# Patient Record
Sex: FEMALE | Race: WHITE | Employment: FULL TIME | URBAN - METROPOLITAN AREA
[De-identification: names, ages, dates, MRNs, and addresses within clinical notes are randomized per-mention and may not be internally consistent; named-entity substitution may affect disease eponyms.]

---

## 2017-02-01 RX ORDER — CANAGLIFLOZIN 100 MG/1
TABLET, FILM COATED ORAL
Qty: 90 TABLET | Refills: 0 | Status: SHIPPED | OUTPATIENT
Start: 2017-02-01 | End: 2017-03-09 | Stop reason: DRUGHIGH

## 2017-02-01 RX ORDER — GLIMEPIRIDE 2 MG/1
TABLET ORAL
Qty: 90 TABLET | Refills: 0 | Status: SHIPPED | OUTPATIENT
Start: 2017-02-01 | End: 2017-03-08

## 2017-02-13 ENCOUNTER — APPOINTMENT (OUTPATIENT)
Dept: LAB | Age: 32
End: 2017-02-13
Attending: INTERNAL MEDICINE
Payer: COMMERCIAL

## 2017-02-13 ENCOUNTER — OFFICE VISIT (OUTPATIENT)
Dept: INTERNAL MEDICINE CLINIC | Facility: CLINIC | Age: 32
End: 2017-02-13

## 2017-02-13 VITALS
TEMPERATURE: 98 F | DIASTOLIC BLOOD PRESSURE: 86 MMHG | SYSTOLIC BLOOD PRESSURE: 126 MMHG | BODY MASS INDEX: 46.78 KG/M2 | HEART RATE: 95 BPM | HEIGHT: 64 IN | OXYGEN SATURATION: 98 % | WEIGHT: 274 LBS

## 2017-02-13 DIAGNOSIS — Z79.4 TYPE 2 DIABETES MELLITUS WITHOUT COMPLICATION, WITH LONG-TERM CURRENT USE OF INSULIN (HCC): ICD-10-CM

## 2017-02-13 DIAGNOSIS — E11.9 TYPE 2 DIABETES MELLITUS WITHOUT COMPLICATION, WITH LONG-TERM CURRENT USE OF INSULIN (HCC): Primary | ICD-10-CM

## 2017-02-13 DIAGNOSIS — Z79.4 TYPE 2 DIABETES MELLITUS WITHOUT COMPLICATION, WITH LONG-TERM CURRENT USE OF INSULIN (HCC): Primary | ICD-10-CM

## 2017-02-13 DIAGNOSIS — E11.9 TYPE 2 DIABETES MELLITUS WITHOUT COMPLICATION, WITH LONG-TERM CURRENT USE OF INSULIN (HCC): ICD-10-CM

## 2017-02-13 DIAGNOSIS — R42 DIZZINESS: ICD-10-CM

## 2017-02-13 PROCEDURE — 36415 COLL VENOUS BLD VENIPUNCTURE: CPT

## 2017-02-13 PROCEDURE — 99214 OFFICE O/P EST MOD 30 MIN: CPT | Performed by: INTERNAL MEDICINE

## 2017-02-13 PROCEDURE — 99212 OFFICE O/P EST SF 10 MIN: CPT | Performed by: INTERNAL MEDICINE

## 2017-02-13 PROCEDURE — 83036 HEMOGLOBIN GLYCOSYLATED A1C: CPT

## 2017-02-13 NOTE — PROGRESS NOTES
Geoff Grandchild is a 27year old female. Patient presents with:  Dizziness: Pt states of dizziness since January, after bending down and getting up, she would get dizzy. On 2/5 she felt dizzy and nauseous. Pt did not vomit. blood sugars were 124.    Jaw Pain: Disp: 180 tablet Rfl: 0   Multiple Vitamins-Minerals (HAIR/SKIN/NAILS) Oral Tab Take by mouth. Disp:  Rfl:    Canagliflozin (INVOKANA) 300 MG Oral Tab Take 1 tablet by mouth daily.  Disp: 90 tablet Rfl: 0   glimepiride 2 MG Oral Tab Take 1 tablet (2 mg tota mention of complication, not stated as uncontrolled 2012   • Asthma      in high school          Past Surgical History    HC IMPLANT EAR TUBES  1990    MOLECULAR EXTRACTION (M11E.1)  2002    Comment wisdom teeth.     ANKLE SCOPE,EXTENS DEBRIDEMNT Right 9/16 hypertonicity noted.    NECK: supple, no carotic bruits, no thyromegaly, no cervical or supraclavicular LAD  LUNGS: clear to auscultation bilaterally, no wheezing or rhonchi  CARDIO: RRR, normal S1S2, no gallops or murmurs  NEURO: PERRL, EOMI, no facial dano

## 2017-02-14 ENCOUNTER — TELEPHONE (OUTPATIENT)
Dept: INTERNAL MEDICINE CLINIC | Facility: CLINIC | Age: 32
End: 2017-02-14

## 2017-02-14 LAB — HBA1C MFR BLD: 7.2 % (ref 4–6)

## 2017-02-14 RX ORDER — LIRAGLUTIDE 6 MG/ML
INJECTION SUBCUTANEOUS
Qty: 9 ML | Refills: 2 | Status: SHIPPED | OUTPATIENT
Start: 2017-02-14 | End: 2017-05-15

## 2017-02-15 NOTE — TELEPHONE ENCOUNTER
Notified a1c results; does not mean that she does not have low blood sugars at times. I would like her to keep track of her sugars during these episodes of dizziness. She is feeling a little bit better with hydration today.

## 2017-02-27 ENCOUNTER — PATIENT MESSAGE (OUTPATIENT)
Dept: INTERNAL MEDICINE CLINIC | Facility: CLINIC | Age: 32
End: 2017-02-27

## 2017-02-28 ENCOUNTER — APPOINTMENT (OUTPATIENT)
Dept: GENERAL RADIOLOGY | Facility: HOSPITAL | Age: 32
End: 2017-02-28
Payer: COMMERCIAL

## 2017-02-28 ENCOUNTER — HOSPITAL ENCOUNTER (EMERGENCY)
Facility: HOSPITAL | Age: 32
Discharge: HOME | End: 2017-02-28
Attending: EMERGENCY MEDICINE
Payer: COMMERCIAL

## 2017-02-28 VITALS
DIASTOLIC BLOOD PRESSURE: 90 MMHG | HEIGHT: 64 IN | HEART RATE: 83 BPM | BODY MASS INDEX: 46.78 KG/M2 | TEMPERATURE: 98 F | RESPIRATION RATE: 20 BRPM | SYSTOLIC BLOOD PRESSURE: 137 MMHG | OXYGEN SATURATION: 98 % | WEIGHT: 274 LBS

## 2017-02-28 DIAGNOSIS — M77.12 LATERAL EPICONDYLITIS OF LEFT ELBOW: Primary | ICD-10-CM

## 2017-02-28 PROCEDURE — 99283 EMERGENCY DEPT VISIT LOW MDM: CPT

## 2017-02-28 RX ORDER — TRAMADOL HYDROCHLORIDE 50 MG/1
50 TABLET ORAL EVERY 4 HOURS PRN
Qty: 14 TABLET | Refills: 0 | Status: SHIPPED | OUTPATIENT
Start: 2017-02-28 | End: 2017-03-03

## 2017-02-28 RX ORDER — PREDNISONE 20 MG/1
40 TABLET ORAL DAILY
Qty: 6 TABLET | Refills: 0 | Status: SHIPPED | OUTPATIENT
Start: 2017-02-28 | End: 2017-03-03

## 2017-02-28 NOTE — TELEPHONE ENCOUNTER
Pt called back she is going to ER for pain in her elbow, please call pt tomorrow [phone]     Tasked to nursing

## 2017-02-28 NOTE — TELEPHONE ENCOUNTER
I called pt on cell [phone]. Pt states L elbow pain started Thurs or Fri. No injury. States pain averages 6 out of 10, intermittent. No swelling, no redness. Pain worsened on Sunday after moving furniture around in bedroom.  She applied a heating pad y

## 2017-02-28 NOTE — TELEPHONE ENCOUNTER
From: Nicanor Jaimes  To: Erica Hollis DO  Sent: 2/27/2017 9:22 PM CST  Subject: Shawnee King,     I have been experiencing pain I my left elbow for a few days now. I'm using a heating pad right now, but it's not helping.  I'm not sure what I can d

## 2017-03-01 ENCOUNTER — TELEPHONE (OUTPATIENT)
Dept: INTERNAL MEDICINE CLINIC | Facility: CLINIC | Age: 32
End: 2017-03-01

## 2017-03-01 NOTE — ED PROVIDER NOTES
Patient Seen in: [facility] AND [facility] Emergency Department    History   Patient presents with:  Upper Extremity Injury (musculoskeletal)    Stated Complaint:     HPI    HPI: Satnam Sousa is a 27year old female who presents after an injury to the l wrist MG TOTAL) BY MOUTH 2 (TWO) TIMES DAILY. Multiple Vitamins-Minerals (HAIR/SKIN/NAILS) Oral Tab,  Take by mouth. Canagliflozin (INVOKANA) 300 MG Oral Tab,  Take 1 tablet by mouth daily.    glimepiride 2 MG Oral Tab,  Take 1 tablet (2 mg total) by mouth da Systems    Positive for stated complaint:   Other systems are as noted in HPI. Constitutional and vital signs reviewed. All other systems reviewed and negative except as noted above.     [facility] and agreed except as otherwise

## 2017-03-01 NOTE — TELEPHONE ENCOUNTER
See [employer] message 2/27/17. Pt went to ED last night and for elbow pain and was dx with Lateral epicondylitis of left elbow. She was given prednisone and tramadol.     LMTCB to ask how pt is doing

## 2017-03-01 NOTE — TELEPHONE ENCOUNTER
Pt did go to ED last night and was dx with Lateral epicondylitis of left elbow. She was given prednisone and tramadol.  See TT 3/1/17

## 2017-03-01 NOTE — TELEPHONE ENCOUNTER
Please advise patient to take the prednisone with food. She should NOT take ibuprofen while she is taking prednisone (these both can upset her stomach). Would recommend that she take tylenol if needed for the pain for the time being.  She should also use an

## 2017-03-01 NOTE — ED NOTES
Pt c/o L elbow pain post moving furniture on Sunday. Pt can't lift, CNS intact, wiggles fingers. Pain when rotating wrist/arm. Denies swelling, warmth.

## 2017-03-01 NOTE — TELEPHONE ENCOUNTER
Patient called back, states she was told by the ER MD that she has tennis elbow.  Patient states she was prescribed prednisone and tramadol and took 2 tabs of the prednisone along with 1 tramadol last night around 8:30-9PM. Patient states that the elbow gustavo

## 2017-03-03 ENCOUNTER — OFFICE VISIT (OUTPATIENT)
Dept: INTERNAL MEDICINE CLINIC | Facility: CLINIC | Age: 32
End: 2017-03-03

## 2017-03-03 VITALS
TEMPERATURE: 98 F | SYSTOLIC BLOOD PRESSURE: 128 MMHG | HEIGHT: 64 IN | HEART RATE: 101 BPM | DIASTOLIC BLOOD PRESSURE: 84 MMHG | RESPIRATION RATE: 18 BRPM | BODY MASS INDEX: 50.02 KG/M2 | WEIGHT: 293 LBS | OXYGEN SATURATION: 96 %

## 2017-03-03 DIAGNOSIS — M77.12 LATERAL EPICONDYLITIS OF LEFT ELBOW: Primary | ICD-10-CM

## 2017-03-03 PROCEDURE — 99213 OFFICE O/P EST LOW 20 MIN: CPT | Performed by: INTERNAL MEDICINE

## 2017-03-03 PROCEDURE — 99212 OFFICE O/P EST SF 10 MIN: CPT | Performed by: INTERNAL MEDICINE

## 2017-03-03 NOTE — PROGRESS NOTES
Kevin العراقي is a 27year old female. Patient presents with:  Elbow Pain: F/u. Pt states continue to have left elbow pain. HPI:   Kevin العراقي is a 27year old female who presents for:    Took steroids and pain medication Tuesday night.  Took with d 2 sprays by Each Nare route daily. Disp: 3 Bottle Rfl: 3   glimepiride 4 MG Oral Tab Take 1 tablet (4 mg total) by mouth daily. Before breadfast Disp: 90 tablet Rfl: 1   LISINOPRIL 5 MG Oral Tab TAKE 1 TABLET (5 MG TOTAL) BY MOUTH DAILY.  Disp: 90 tablet Rf • Cancer Maternal Grandmother      unknown   • Seizure Disorder Brother    • Cancer Paternal Uncle      lung, smoker      Social History:     Smoking Status: Former Smoker                   Packs/Day: 0.00  Years: 12        Types: Cigarettes      Quit da

## 2017-03-08 RX ORDER — GLIMEPIRIDE 4 MG/1
TABLET ORAL
Qty: 90 TABLET | Refills: 0 | Status: SHIPPED | OUTPATIENT
Start: 2017-03-08 | End: 2017-05-09

## 2017-03-09 RX ORDER — CANAGLIFLOZIN 300 MG/1
TABLET, FILM COATED ORAL
Qty: 90 TABLET | Refills: 0 | Status: SHIPPED | OUTPATIENT
Start: 2017-03-09 | End: 2017-06-10

## 2017-03-09 NOTE — TELEPHONE ENCOUNTER
LOV 12/6/16. F/U 4/11/17. Per AM protocol OK to refill 90 days per AM protocol. Per LOV note invokana was increased to 300 mg daily.  Discontinued order for invokana 100 mg

## 2017-03-24 RX ORDER — OMEPRAZOLE 20 MG/1
CAPSULE, DELAYED RELEASE ORAL
Qty: 90 CAPSULE | Refills: 1 | Status: SHIPPED | OUTPATIENT
Start: 2017-03-24 | End: 2017-09-15

## 2017-04-11 ENCOUNTER — OFFICE VISIT (OUTPATIENT)
Dept: ENDOCRINOLOGY CLINIC | Facility: CLINIC | Age: 32
End: 2017-04-11

## 2017-04-11 VITALS
BODY MASS INDEX: 49.71 KG/M2 | WEIGHT: 291.19 LBS | HEIGHT: 64 IN | HEART RATE: 101 BPM | SYSTOLIC BLOOD PRESSURE: 124 MMHG | DIASTOLIC BLOOD PRESSURE: 82 MMHG

## 2017-04-11 DIAGNOSIS — E66.09 OBESITY DUE TO EXCESS CALORIES, UNSPECIFIED OBESITY SEVERITY: ICD-10-CM

## 2017-04-11 DIAGNOSIS — E11.69 DYSLIPIDEMIA ASSOCIATED WITH TYPE 2 DIABETES MELLITUS (HCC): ICD-10-CM

## 2017-04-11 DIAGNOSIS — E78.5 DYSLIPIDEMIA ASSOCIATED WITH TYPE 2 DIABETES MELLITUS (HCC): ICD-10-CM

## 2017-04-11 DIAGNOSIS — E11.65 UNCONTROLLED TYPE 2 DIABETES MELLITUS WITH HYPERGLYCEMIA, WITHOUT LONG-TERM CURRENT USE OF INSULIN (HCC): Primary | ICD-10-CM

## 2017-04-11 PROCEDURE — 83036 HEMOGLOBIN GLYCOSYLATED A1C: CPT | Performed by: INTERNAL MEDICINE

## 2017-04-11 PROCEDURE — 82962 GLUCOSE BLOOD TEST: CPT | Performed by: INTERNAL MEDICINE

## 2017-04-11 PROCEDURE — 36416 COLLJ CAPILLARY BLOOD SPEC: CPT | Performed by: INTERNAL MEDICINE

## 2017-04-11 PROCEDURE — 99214 OFFICE O/P EST MOD 30 MIN: CPT | Performed by: INTERNAL MEDICINE

## 2017-05-08 ENCOUNTER — PATIENT MESSAGE (OUTPATIENT)
Dept: ENDOCRINOLOGY CLINIC | Facility: CLINIC | Age: 32
End: 2017-05-08

## 2017-05-08 ENCOUNTER — PATIENT MESSAGE (OUTPATIENT)
Dept: INTERNAL MEDICINE CLINIC | Facility: CLINIC | Age: 32
End: 2017-05-08

## 2017-05-08 DIAGNOSIS — E11.9 TYPE 2 DIABETES MELLITUS WITHOUT COMPLICATION, WITHOUT LONG-TERM CURRENT USE OF INSULIN (HCC): Primary | ICD-10-CM

## 2017-05-08 NOTE — TELEPHONE ENCOUNTER
From: Brinda Galeana  To: Lizzette DO  Sent: 5/8/2017 5:33 PM CDT  Subject: Other    Hi Dr. Maximilian Felipe,     Can you please send a new prescription for lisinopril 2.5mg to [facility] in [facility]?     Thanks,   Fermin Crigler" Becci

## 2017-05-08 NOTE — TELEPHONE ENCOUNTER
To Dr. Regino Maher, please advise.  I can't find documentation of lisinopril dose being changed from 5mg to 2.5mg

## 2017-05-09 RX ORDER — GLIMEPIRIDE 4 MG/1
8 TABLET ORAL
Qty: 180 TABLET | Refills: 0 | Status: SHIPPED | OUTPATIENT
Start: 2017-05-09 | End: 2017-08-15

## 2017-05-09 RX ORDER — LISINOPRIL 5 MG/1
2.5 TABLET ORAL
Qty: 45 TABLET | Refills: 3 | Status: SHIPPED | OUTPATIENT
Start: 2017-05-09 | End: 2018-05-01

## 2017-05-09 NOTE — TELEPHONE ENCOUNTER
Please notify patient that rx was sent. I have put blood test orders in for a1c and CMP that she should do these in August. She should come in for a physical at that time.

## 2017-05-09 NOTE — TELEPHONE ENCOUNTER
Called pt at [phone] and LMOVM per [facility] relaying Dr. Bartolo Magana 's message

## 2017-05-09 NOTE — TELEPHONE ENCOUNTER
From: Francine Gaffney  To: Aranza Sellers MD  Sent: 5/8/2017 5:34 PM CDT  Subject: Other    Hello Dr. Mary Abraham,    Can you please send a new prescription for Glimepiride 4mg twice daily to [facility] in [facility] please?     Thanks,   Alcira Antonio

## 2017-05-09 NOTE — TELEPHONE ENCOUNTER
LOV 4/11/17. F/U 7/11/17. Please review LOV note. Patient was instructed to take 8mg daily, not 4mg BID. Refill set up per LOV note.

## 2017-05-15 ENCOUNTER — OFFICE VISIT (OUTPATIENT)
Dept: INTERNAL MEDICINE CLINIC | Facility: CLINIC | Age: 32
End: 2017-05-15

## 2017-05-15 VITALS
WEIGHT: 288.38 LBS | HEIGHT: 64 IN | SYSTOLIC BLOOD PRESSURE: 124 MMHG | HEART RATE: 91 BPM | BODY MASS INDEX: 49.23 KG/M2 | DIASTOLIC BLOOD PRESSURE: 86 MMHG | OXYGEN SATURATION: 98 % | TEMPERATURE: 98 F

## 2017-05-15 DIAGNOSIS — E11.9 TYPE 2 DIABETES MELLITUS WITHOUT COMPLICATION, WITHOUT LONG-TERM CURRENT USE OF INSULIN (HCC): ICD-10-CM

## 2017-05-15 DIAGNOSIS — K21.9 GASTROESOPHAGEAL REFLUX DISEASE, ESOPHAGITIS PRESENCE NOT SPECIFIED: ICD-10-CM

## 2017-05-15 DIAGNOSIS — E78.00 PURE HYPERCHOLESTEROLEMIA: ICD-10-CM

## 2017-05-15 DIAGNOSIS — K20.90 ESOPHAGITIS: Primary | ICD-10-CM

## 2017-05-15 PROCEDURE — 99212 OFFICE O/P EST SF 10 MIN: CPT | Performed by: INTERNAL MEDICINE

## 2017-05-15 PROCEDURE — 99214 OFFICE O/P EST MOD 30 MIN: CPT | Performed by: INTERNAL MEDICINE

## 2017-05-16 RX ORDER — LIRAGLUTIDE 6 MG/ML
INJECTION SUBCUTANEOUS
Qty: 9 ML | Refills: 3 | Status: SHIPPED | OUTPATIENT
Start: 2017-05-16 | End: 2017-09-11

## 2017-05-16 NOTE — PROGRESS NOTES
Kevin العراقي is a 34year old female. HPI:   Patient presents with:  Sore Throat: Patient woke up this morning with a sore throat. Her head started hurting later in the day. She coughed up some mucus with blood in it this morning.  She reports drinking Types: Cigarettes      Quit date: 03/25/2014    Smokeless Status: Never Used                        Comment: 0.5 pack q 2 wks    Alcohol Use: Yes           0.6 oz/week       1 Standard drinks or equivalent per week       Comment: 1-2 drinks/week Pen-injector INJECT 1.8 MG INTO THE SKIN DAILY.  Disp: 9 mL Rfl: 3       Allergies:    Codeine                 Other (See Comments)    Comment:Sleep walks, hallucinations  Pineapple               Tongue Swelling  Vicodin [Hydrocodon*    Nausea and vomiting Jesus Mcbride MD

## 2017-06-09 RX ORDER — ATORVASTATIN CALCIUM 40 MG/1
TABLET, FILM COATED ORAL
Qty: 90 TABLET | Refills: 3 | Status: SHIPPED | OUTPATIENT
Start: 2017-06-09 | End: 2018-06-13

## 2017-06-12 RX ORDER — CANAGLIFLOZIN 300 MG/1
TABLET, FILM COATED ORAL
Qty: 90 TABLET | Refills: 0 | Status: SHIPPED | OUTPATIENT
Start: 2017-06-12 | End: 2017-09-09

## 2017-06-26 ENCOUNTER — OFFICE VISIT (OUTPATIENT)
Dept: INTERNAL MEDICINE CLINIC | Facility: CLINIC | Age: 32
End: 2017-06-26

## 2017-06-26 VITALS
HEIGHT: 64 IN | TEMPERATURE: 98 F | BODY MASS INDEX: 48.83 KG/M2 | SYSTOLIC BLOOD PRESSURE: 110 MMHG | HEART RATE: 80 BPM | DIASTOLIC BLOOD PRESSURE: 74 MMHG | WEIGHT: 286 LBS

## 2017-06-26 DIAGNOSIS — M79.89 SWELLING OF DIGIT OF LEFT HAND: Primary | ICD-10-CM

## 2017-06-26 PROCEDURE — 99213 OFFICE O/P EST LOW 20 MIN: CPT | Performed by: INTERNAL MEDICINE

## 2017-06-26 PROCEDURE — 99212 OFFICE O/P EST SF 10 MIN: CPT | Performed by: INTERNAL MEDICINE

## 2017-07-11 ENCOUNTER — OFFICE VISIT (OUTPATIENT)
Dept: ENDOCRINOLOGY CLINIC | Facility: CLINIC | Age: 32
End: 2017-07-11

## 2017-07-11 VITALS
SYSTOLIC BLOOD PRESSURE: 135 MMHG | BODY MASS INDEX: 49.34 KG/M2 | WEIGHT: 289 LBS | DIASTOLIC BLOOD PRESSURE: 93 MMHG | HEIGHT: 64 IN | HEART RATE: 89 BPM

## 2017-07-11 DIAGNOSIS — E66.9 OBESITY, UNSPECIFIED OBESITY SEVERITY, UNSPECIFIED OBESITY TYPE: ICD-10-CM

## 2017-07-11 DIAGNOSIS — E78.5 DYSLIPIDEMIA ASSOCIATED WITH TYPE 2 DIABETES MELLITUS (HCC): ICD-10-CM

## 2017-07-11 DIAGNOSIS — E11.9 TYPE 2 DIABETES MELLITUS WITHOUT COMPLICATION, WITHOUT LONG-TERM CURRENT USE OF INSULIN (HCC): Primary | ICD-10-CM

## 2017-07-11 DIAGNOSIS — E11.69 DYSLIPIDEMIA ASSOCIATED WITH TYPE 2 DIABETES MELLITUS (HCC): ICD-10-CM

## 2017-07-11 LAB
CARTRIDGE LOT#: ABNORMAL NUMERIC
GLUCOSE BLOOD: 136
HEMOGLOBIN A1C: 6.8 % (ref 4.3–5.6)
TEST STRIP LOT #: NORMAL NUMERIC

## 2017-07-11 PROCEDURE — 36416 COLLJ CAPILLARY BLOOD SPEC: CPT | Performed by: INTERNAL MEDICINE

## 2017-07-11 PROCEDURE — 99214 OFFICE O/P EST MOD 30 MIN: CPT | Performed by: INTERNAL MEDICINE

## 2017-07-11 PROCEDURE — 82962 GLUCOSE BLOOD TEST: CPT | Performed by: INTERNAL MEDICINE

## 2017-07-11 PROCEDURE — 83036 HEMOGLOBIN GLYCOSYLATED A1C: CPT | Performed by: INTERNAL MEDICINE

## 2017-07-11 NOTE — PROGRESS NOTES
Return Office Visit - Diabetes    CHIEF COMPLAINT:    DM   Dyslipidemia  Obesity    HISTORY OF PRESENT ILLNESS:   Tip Johnson is a 34year old female who presents for follow up for diabetes.     DM HISTORY  Diagnosed: at age 26    FH of DM: Parents have Each Nare route daily.  Disp: 3 Bottle Rfl: 3   Blood Glucose Monitoring Suppl (ACCU-CHEK RYANN PLUS) W/DEVICE Does not apply Kit Use meter to check blood glucose as instructed Disp: 1 kit Rfl: 0   ACCU-CHEK FASTCLIX LANCETS Does not apply Misc Check BG 4 t atraumatic  NECK:  Supple, symmetrical, trachea midline, no adenopathy, thyroid symmetric, not enlarged and no tenderness  LUNGS: clear to auscultation bilaterally, no crackles or wheezing  CARDIOVASCULAR:  regular rate and rhythm, normal S1 and S2  ABDOME recognition and management discussed    2. Patient’s BP is normal.   3. LDL is 59  A) Discussed lifestyle modifications including reductions in dietary total and saturated fat, weight loss, aerobic exercise, and eating a diet rich in fruits and vegetables.

## 2017-07-15 NOTE — PROGRESS NOTES
Erasmo Richter is a 34year old female. Patient presents with:  Checkup: pain in little finger left hand      HPI:   Erasmo Richter is a 34year old female who presents for: finger pain    Reports pain in 5th digit of left hand. Denies injury to the area.  Diego Hint LANCETS Does not apply Misc Check BG 4 times a day Disp: 100 each Rfl: 3   Glucose Blood (ACCU-CHEK RYANN PLUS) In Vitro Strip Check BG 4 times a day Disp: 100 each Rfl: 3   aspirin 81 MG Oral Tab Take 81 mg by mouth daily.  Disp:  Rfl:    ibuprofen (MOTRIN equivalent: 1 per week     Comment: 1-2 drinks/week         REVIEW OF SYSTEMS:   GENERAL: feels well otherwise  Reports LUE 5th digit pain and edema    EXAM:   /74   Pulse 80   Temp 98.4 °F (36.9 °C) (Oral)   Ht 5' 4\" (1.626 m)   Wt 286 lb (129.7 kg

## 2017-08-09 RX ORDER — LISINOPRIL 5 MG/1
TABLET ORAL
Qty: 90 TABLET | Refills: 2 | OUTPATIENT
Start: 2017-08-09

## 2017-08-15 RX ORDER — GLIMEPIRIDE 4 MG/1
8 TABLET ORAL
Qty: 180 TABLET | Refills: 0 | Status: SHIPPED | OUTPATIENT
Start: 2017-08-15 | End: 2017-11-10

## 2017-09-11 RX ORDER — LIRAGLUTIDE 6 MG/ML
INJECTION SUBCUTANEOUS
Qty: 9 ML | Refills: 2 | Status: SHIPPED | OUTPATIENT
Start: 2017-09-11 | End: 2017-12-15

## 2017-09-11 RX ORDER — CANAGLIFLOZIN 300 MG/1
TABLET, FILM COATED ORAL
Qty: 90 TABLET | Refills: 0 | Status: SHIPPED | OUTPATIENT
Start: 2017-09-11 | End: 2017-12-13

## 2017-09-15 RX ORDER — OMEPRAZOLE 20 MG/1
CAPSULE, DELAYED RELEASE ORAL
Qty: 90 CAPSULE | Refills: 1 | Status: SHIPPED | OUTPATIENT
Start: 2017-09-15 | End: 2018-03-09

## 2017-11-07 ENCOUNTER — OFFICE VISIT (OUTPATIENT)
Dept: ENDOCRINOLOGY CLINIC | Facility: CLINIC | Age: 32
End: 2017-11-07

## 2017-11-07 VITALS
DIASTOLIC BLOOD PRESSURE: 86 MMHG | WEIGHT: 291.38 LBS | BODY MASS INDEX: 49.75 KG/M2 | HEART RATE: 90 BPM | SYSTOLIC BLOOD PRESSURE: 123 MMHG | HEIGHT: 64 IN

## 2017-11-07 DIAGNOSIS — E11.65 UNCONTROLLED TYPE 2 DIABETES MELLITUS WITH HYPERGLYCEMIA, WITHOUT LONG-TERM CURRENT USE OF INSULIN (HCC): Primary | ICD-10-CM

## 2017-11-07 PROCEDURE — 99213 OFFICE O/P EST LOW 20 MIN: CPT | Performed by: INTERNAL MEDICINE

## 2017-11-07 PROCEDURE — 36416 COLLJ CAPILLARY BLOOD SPEC: CPT | Performed by: INTERNAL MEDICINE

## 2017-11-07 PROCEDURE — 82962 GLUCOSE BLOOD TEST: CPT | Performed by: INTERNAL MEDICINE

## 2017-11-07 PROCEDURE — 83036 HEMOGLOBIN GLYCOSYLATED A1C: CPT | Performed by: INTERNAL MEDICINE

## 2017-11-10 RX ORDER — GLIMEPIRIDE 4 MG/1
8 TABLET ORAL
Qty: 180 TABLET | Refills: 0 | Status: SHIPPED | OUTPATIENT
Start: 2017-11-10 | End: 2018-02-07

## 2017-12-13 RX ORDER — CANAGLIFLOZIN 300 MG/1
TABLET, FILM COATED ORAL
Qty: 90 TABLET | Refills: 0 | Status: SHIPPED | OUTPATIENT
Start: 2017-12-13 | End: 2018-03-09

## 2017-12-14 RX ORDER — PEN NEEDLE, DIABETIC 32GX 5/32"
NEEDLE, DISPOSABLE MISCELLANEOUS
Qty: 100 EACH | Refills: 2 | Status: SHIPPED | OUTPATIENT
Start: 2017-12-14 | End: 2018-09-12

## 2017-12-15 RX ORDER — LIRAGLUTIDE 6 MG/ML
INJECTION SUBCUTANEOUS
Qty: 9 ML | Refills: 2 | Status: SHIPPED | OUTPATIENT
Start: 2017-12-15 | End: 2018-04-14

## 2018-02-07 RX ORDER — GLIMEPIRIDE 4 MG/1
8 TABLET ORAL
Qty: 180 TABLET | Refills: 0 | Status: SHIPPED | OUTPATIENT
Start: 2018-02-07 | End: 2018-05-12

## 2018-02-20 ENCOUNTER — OFFICE VISIT (OUTPATIENT)
Dept: ENDOCRINOLOGY CLINIC | Facility: CLINIC | Age: 33
End: 2018-02-20

## 2018-02-20 ENCOUNTER — APPOINTMENT (OUTPATIENT)
Dept: LAB | Facility: HOSPITAL | Age: 33
End: 2018-02-20
Attending: INTERNAL MEDICINE
Payer: COMMERCIAL

## 2018-02-20 VITALS
DIASTOLIC BLOOD PRESSURE: 84 MMHG | HEART RATE: 98 BPM | SYSTOLIC BLOOD PRESSURE: 132 MMHG | HEIGHT: 64 IN | BODY MASS INDEX: 48.14 KG/M2 | WEIGHT: 282 LBS

## 2018-02-20 DIAGNOSIS — E78.5 DYSLIPIDEMIA: ICD-10-CM

## 2018-02-20 DIAGNOSIS — E11.65 UNCONTROLLED TYPE 2 DIABETES MELLITUS WITH COMPLICATION, UNSPECIFIED LONG TERM INSULIN USE STATUS: ICD-10-CM

## 2018-02-20 DIAGNOSIS — E11.8 UNCONTROLLED TYPE 2 DIABETES MELLITUS WITH COMPLICATION, UNSPECIFIED LONG TERM INSULIN USE STATUS: Primary | ICD-10-CM

## 2018-02-20 DIAGNOSIS — E11.8 UNCONTROLLED TYPE 2 DIABETES MELLITUS WITH COMPLICATION, UNSPECIFIED LONG TERM INSULIN USE STATUS: ICD-10-CM

## 2018-02-20 DIAGNOSIS — E66.01 CLASS 3 SEVERE OBESITY DUE TO EXCESS CALORIES WITH SERIOUS COMORBIDITY AND BODY MASS INDEX (BMI) OF 45.0 TO 49.9 IN ADULT (HCC): ICD-10-CM

## 2018-02-20 DIAGNOSIS — E11.65 UNCONTROLLED TYPE 2 DIABETES MELLITUS WITH COMPLICATION, UNSPECIFIED LONG TERM INSULIN USE STATUS: Primary | ICD-10-CM

## 2018-02-20 LAB
ALBUMIN SERPL BCP-MCNC: 3.9 G/DL (ref 3.5–4.8)
ALBUMIN/GLOB SERPL: 1.3 {RATIO} (ref 1–2)
ALP SERPL-CCNC: 69 U/L (ref 32–100)
ALT SERPL-CCNC: 15 U/L (ref 14–54)
ANION GAP SERPL CALC-SCNC: 8 MMOL/L (ref 0–18)
AST SERPL-CCNC: 14 U/L (ref 15–41)
BILIRUB SERPL-MCNC: 0.4 MG/DL (ref 0.3–1.2)
BUN SERPL-MCNC: 8 MG/DL (ref 8–20)
BUN/CREAT SERPL: 12.3 (ref 10–20)
CALCIUM SERPL-MCNC: 9.2 MG/DL (ref 8.5–10.5)
CARTRIDGE LOT#: ABNORMAL NUMERIC
CHLORIDE SERPL-SCNC: 102 MMOL/L (ref 95–110)
CO2 SERPL-SCNC: 26 MMOL/L (ref 22–32)
CREAT SERPL-MCNC: 0.65 MG/DL (ref 0.5–1.5)
CREAT UR-MCNC: 55.7 MG/DL
GLOBULIN PLAS-MCNC: 3 G/DL (ref 2.5–3.7)
GLUCOSE BLOOD: 227
GLUCOSE SERPL-MCNC: 163 MG/DL (ref 70–99)
HEMOGLOBIN A1C: 6.7 % (ref 4.3–5.6)
LDLC SERPL DIRECT ASSAY-MCNC: 68 MG/DL (ref 0–99)
MICROALBUMIN UR-MCNC: 0.5 MG/DL (ref 0–1.8)
MICROALBUMIN/CREAT UR: 9 MG/G{CREAT} (ref 0–20)
OSMOLALITY UR CALC.SUM OF ELEC: 284 MOSM/KG (ref 275–295)
PATIENT FASTING: NO
POTASSIUM SERPL-SCNC: 4.1 MMOL/L (ref 3.3–5.1)
PROT SERPL-MCNC: 6.9 G/DL (ref 5.9–8.4)
SODIUM SERPL-SCNC: 136 MMOL/L (ref 136–144)
TEST STRIP LOT #: NORMAL NUMERIC

## 2018-02-20 PROCEDURE — 82570 ASSAY OF URINE CREATININE: CPT

## 2018-02-20 PROCEDURE — 82962 GLUCOSE BLOOD TEST: CPT | Performed by: INTERNAL MEDICINE

## 2018-02-20 PROCEDURE — 99214 OFFICE O/P EST MOD 30 MIN: CPT | Performed by: INTERNAL MEDICINE

## 2018-02-20 PROCEDURE — 36415 COLL VENOUS BLD VENIPUNCTURE: CPT

## 2018-02-20 PROCEDURE — 82043 UR ALBUMIN QUANTITATIVE: CPT

## 2018-02-20 PROCEDURE — 80053 COMPREHEN METABOLIC PANEL: CPT

## 2018-02-20 PROCEDURE — 36416 COLLJ CAPILLARY BLOOD SPEC: CPT | Performed by: INTERNAL MEDICINE

## 2018-02-20 PROCEDURE — 83036 HEMOGLOBIN GLYCOSYLATED A1C: CPT | Performed by: INTERNAL MEDICINE

## 2018-02-20 PROCEDURE — 83721 ASSAY OF BLOOD LIPOPROTEIN: CPT

## 2018-02-20 NOTE — PROGRESS NOTES
Return Office Visit - Diabetes    CHIEF COMPLAINT:    DM   Dyslipidemia  Obesity    HISTORY OF PRESENT ILLNESS:   Iván Russ is a 34year old female who presents for follow up for diabetes.     DM HISTORY  Diagnosed: at age 26    FH of DM: Parents have Glucose Monitoring Suppl (ACCU-CHEK RYANN PLUS) W/DEVICE Does not apply Kit Use meter to check blood glucose as instructed Disp: 1 kit Rfl: 0   ACCU-CHEK FASTCLIX LANCETS Does not apply Misc Check BG 4 times a day Disp: 100 each Rfl: 3   Glucose Blood (ACC to auscultation bilaterally, no crackles or wheezing  CARDIOVASCULAR:  regular rate and rhythm, normal S1 and S2  ABDOMEN:  normal bowel sounds, soft, non-distended, non-tender  SKIN:  no bruising or bleeding, no rashes and no lesions  DIABETIC FOOT EXAM: Orders Placed This Encounter      POC Finger stick glucose [38432]      POC Glycohemoglobin [05652]      Microalb/Creat Ratio, Random Urine [E]      Direct LDL      Comp Metabolic Panel [E]

## 2018-02-21 ENCOUNTER — PATIENT MESSAGE (OUTPATIENT)
Dept: ENDOCRINOLOGY CLINIC | Facility: CLINIC | Age: 33
End: 2018-02-21

## 2018-03-09 RX ORDER — CANAGLIFLOZIN 300 MG/1
TABLET, FILM COATED ORAL
Qty: 90 TABLET | Refills: 0 | Status: SHIPPED | OUTPATIENT
Start: 2018-03-09 | End: 2018-06-14

## 2018-03-09 RX ORDER — OMEPRAZOLE 20 MG/1
CAPSULE, DELAYED RELEASE ORAL
Qty: 90 CAPSULE | Refills: 1 | Status: SHIPPED | OUTPATIENT
Start: 2018-03-09 | End: 2018-03-22

## 2018-03-22 ENCOUNTER — OFFICE VISIT (OUTPATIENT)
Dept: INTERNAL MEDICINE CLINIC | Facility: CLINIC | Age: 33
End: 2018-03-22

## 2018-03-22 VITALS
WEIGHT: 282 LBS | DIASTOLIC BLOOD PRESSURE: 88 MMHG | TEMPERATURE: 99 F | SYSTOLIC BLOOD PRESSURE: 132 MMHG | HEART RATE: 97 BPM | HEIGHT: 64 IN | BODY MASS INDEX: 48.14 KG/M2 | OXYGEN SATURATION: 98 %

## 2018-03-22 DIAGNOSIS — E78.00 PURE HYPERCHOLESTEROLEMIA: ICD-10-CM

## 2018-03-22 DIAGNOSIS — E66.01 MORBID OBESITY DUE TO EXCESS CALORIES (HCC): ICD-10-CM

## 2018-03-22 DIAGNOSIS — Z00.00 ANNUAL PHYSICAL EXAM: Primary | ICD-10-CM

## 2018-03-22 DIAGNOSIS — K21.9 GASTROESOPHAGEAL REFLUX DISEASE, ESOPHAGITIS PRESENCE NOT SPECIFIED: ICD-10-CM

## 2018-03-22 DIAGNOSIS — E11.9 TYPE 2 DIABETES MELLITUS WITHOUT COMPLICATION, WITHOUT LONG-TERM CURRENT USE OF INSULIN (HCC): ICD-10-CM

## 2018-03-22 DIAGNOSIS — I10 ESSENTIAL HYPERTENSION: ICD-10-CM

## 2018-03-22 PROCEDURE — 99395 PREV VISIT EST AGE 18-39: CPT | Performed by: INTERNAL MEDICINE

## 2018-03-22 PROCEDURE — 90732 PPSV23 VACC 2 YRS+ SUBQ/IM: CPT | Performed by: INTERNAL MEDICINE

## 2018-03-22 PROCEDURE — 90471 IMMUNIZATION ADMIN: CPT | Performed by: INTERNAL MEDICINE

## 2018-03-22 RX ORDER — FAMOTIDINE 20 MG/1
20 TABLET ORAL DAILY
Qty: 30 TABLET | Refills: 3 | Status: SHIPPED | OUTPATIENT
Start: 2018-03-22 | End: 2018-07-05

## 2018-03-22 RX ORDER — NYSTATIN 10B UNIT
POWDER (EA) MISCELLANEOUS
Qty: 1 BOTTLE | Refills: 0 | Status: SHIPPED | OUTPATIENT
Start: 2018-03-22 | End: 2018-07-05

## 2018-03-22 NOTE — PROGRESS NOTES
Yasir Poon is a 34year old female. Patient presents with:  Physical: See's midwife at gyne office for pap smears. Next pap due this year. states she did alot of walking at [facility] for an event x2days.  After that she had pain to right ankle for 3 days DAILY. Disp: 9 mL Rfl: 2   BD PEN NEEDLE MIGUEL U/F 32G X 4 MM Does not apply Misc INJECT ONCE DAILY Disp: 100 each Rfl: 2   ATORVASTATIN 40 MG Oral Tab TAKE 1 TABLET (40 MG TOTAL) BY MOUTH NIGHTLY.  Disp: 90 tablet Rfl: 3   lisinopril 5 MG Oral Tab Take 0.5 surgery   Family History   Problem Relation Age of Onset   • Diabetes Father 36   • Hypertension Father    • Diabetes Mother 36   • Seizure Disorder Brother    • Cancer Maternal Grandmother      unknown   • Cancer Paternal Uncle      lung, smoker      Soci diabetes mellitus with microalbuminuria ([facility]  Follows with Dr. Arjun Gross. Takes invokana 300mg daily, metformin 1000mg bid, glimeperide 8mg daily, victoza 1.8mg daily. Last a1c 6.7%. Lisinopril 5mg daily.      3. Essential hypertension with goal blood pres

## 2018-04-16 RX ORDER — LIRAGLUTIDE 6 MG/ML
INJECTION SUBCUTANEOUS
Qty: 27 ML | Refills: 0 | Status: SHIPPED | OUTPATIENT
Start: 2018-04-16 | End: 2018-07-11

## 2018-05-01 NOTE — TELEPHONE ENCOUNTER
To DR. Mickie Emmanuel to clarify dose. This RX is for lisinopril 5mg-take 1/2 tablet (2.5mg) daily. LOV states patient is taking 5mg.

## 2018-05-04 RX ORDER — LISINOPRIL 5 MG/1
5 TABLET ORAL DAILY
Qty: 90 TABLET | Refills: 3 | Status: SHIPPED | OUTPATIENT
Start: 2018-05-04 | End: 2018-08-02

## 2018-05-04 NOTE — TELEPHONE ENCOUNTER
Pt states she is taking lisinopril 5mg, 0.5 tablet (2.5mg) daily    To Dr. Refugio Hill, please review as LOV noted pt taking 5mg daily

## 2018-05-10 ENCOUNTER — TELEPHONE (OUTPATIENT)
Dept: INTERNAL MEDICINE CLINIC | Facility: CLINIC | Age: 33
End: 2018-05-10

## 2018-05-10 DIAGNOSIS — Z01.84 IMMUNITY STATUS TESTING: Primary | ICD-10-CM

## 2018-05-10 NOTE — TELEPHONE ENCOUNTER
Please notify patient that I did receive her vaccination records, and I do not see hepatitis B records. I have orderd a blood test to see if she is immune to hepatitis B, and then we can proceed if needed.      Also would still recommend calling the aaron

## 2018-05-14 RX ORDER — GLIMEPIRIDE 4 MG/1
8 TABLET ORAL
Qty: 180 TABLET | Refills: 0 | Status: SHIPPED | OUTPATIENT
Start: 2018-05-14 | End: 2018-08-20

## 2018-05-22 ENCOUNTER — OFFICE VISIT (OUTPATIENT)
Dept: SURGERY | Facility: CLINIC | Age: 33
End: 2018-05-22

## 2018-05-22 VITALS
HEIGHT: 64.5 IN | SYSTOLIC BLOOD PRESSURE: 100 MMHG | RESPIRATION RATE: 18 BRPM | HEART RATE: 94 BPM | BODY MASS INDEX: 48.4 KG/M2 | OXYGEN SATURATION: 97 % | WEIGHT: 287 LBS | DIASTOLIC BLOOD PRESSURE: 60 MMHG

## 2018-05-22 DIAGNOSIS — E11.9 TYPE 2 DIABETES MELLITUS WITHOUT COMPLICATION, WITHOUT LONG-TERM CURRENT USE OF INSULIN (HCC): ICD-10-CM

## 2018-05-22 DIAGNOSIS — E66.01 MORBID OBESITY WITH BMI OF 45.0-49.9, ADULT (HCC): Primary | ICD-10-CM

## 2018-05-22 DIAGNOSIS — E78.5 DYSLIPIDEMIA: ICD-10-CM

## 2018-05-22 PROCEDURE — 99203 OFFICE O/P NEW LOW 30 MIN: CPT | Performed by: NURSE PRACTITIONER

## 2018-05-22 NOTE — PATIENT INSTRUCTIONS
Goals for next month:  1. Keep a food log using [facility]  2. Drink 48-64 ounces of water per day. No fruit juices or regular soda. 3. Increase aerobic exercises (goal is 150 minutes per week)  4. Increase fruit and vegetable servings/day  5.  Keep carbs at or b

## 2018-05-22 NOTE — PROGRESS NOTES
The [facility]ness [facility] Weight Loss Consultation Note       Date of Consult:  2018    Patient:  Tyler Wu  :      3/30/1985  MRN:      [id number]    Referring Provider: Dr. Mcneal ref.  provider found    Chief Complaint:  Patient presents with:  Consult Rfl: 3   VICTOZA 18 MG/3ML Subcutaneous Solution Pen-injector INJECT 1.8 MG INTO THE SKIN DAILY. Disp: 27 mL Rfl: 0   Nystatin Does not apply Powder Apply under L breast tissue twice daily x 5-7 days.  Disp: 1 Bottle Rfl: 0   famoTIDine 20 MG Oral Tab Take Comment: Coffee - 1 cup per day      Social History Narrative   None on file     Surgical History:  Past Surgical History:  9/16/2014: Mar Limb DEBRIDEMNT Right      Comment: Procedure: ARTHROSCOPY ANKLE WITH DEBRIDEMENT;               Surgeon day, Maintain a daily food journal, No drinking 30 minutes before or after meals, Utilize portion control strategies to reduce calorie intake, Identify triggers for eating and manage cues and Eat slowly and take 20 to 30 minutes to complete each meal 108 08/27/2016 08:16 AM       Encounter Diagnosis(ses):    Morbid obesity with BMI of 45.0-49.9, adult ([facility]  (primary encounter diagnosis)  Type 2 diabetes mellitus without complication, without long-term current use of insulin ([facility]  Dyslipidemia    PLAN family/friend support system    Reviewed most recent labs    Counseled Patient on comprehensive weight loss plan including attention to nutrition, exercise and behavior/stress management for success.      Follow up in 6 weeks    [address] MARYAM Aguilar  5/22/20

## 2018-06-13 RX ORDER — ATORVASTATIN CALCIUM 40 MG/1
TABLET, FILM COATED ORAL
Qty: 90 TABLET | Refills: 3 | Status: SHIPPED | OUTPATIENT
Start: 2018-06-13 | End: 2021-02-16

## 2018-06-13 NOTE — TELEPHONE ENCOUNTER
called and Relayed MD's message to patient---verbalized understanding. She will have Hep B testing done. Sh will not be going on the mission trip this year. Rescheduled for next year.

## 2018-06-14 RX ORDER — CANAGLIFLOZIN 300 MG/1
TABLET, FILM COATED ORAL
Qty: 30 TABLET | Refills: 0 | Status: SHIPPED | OUTPATIENT
Start: 2018-06-14 | End: 2018-06-19

## 2018-06-19 ENCOUNTER — APPOINTMENT (OUTPATIENT)
Dept: LAB | Facility: HOSPITAL | Age: 33
End: 2018-06-19
Attending: INTERNAL MEDICINE
Payer: COMMERCIAL

## 2018-06-19 ENCOUNTER — OFFICE VISIT (OUTPATIENT)
Dept: ENDOCRINOLOGY CLINIC | Facility: CLINIC | Age: 33
End: 2018-06-19

## 2018-06-19 VITALS
BODY MASS INDEX: 49.68 KG/M2 | HEART RATE: 90 BPM | DIASTOLIC BLOOD PRESSURE: 78 MMHG | HEIGHT: 64 IN | WEIGHT: 291 LBS | SYSTOLIC BLOOD PRESSURE: 113 MMHG

## 2018-06-19 DIAGNOSIS — Z01.84 IMMUNITY STATUS TESTING: ICD-10-CM

## 2018-06-19 DIAGNOSIS — E11.9 CONTROLLED TYPE 2 DIABETES MELLITUS WITHOUT COMPLICATION, WITHOUT LONG-TERM CURRENT USE OF INSULIN (HCC): Primary | ICD-10-CM

## 2018-06-19 DIAGNOSIS — E78.5 DYSLIPIDEMIA: ICD-10-CM

## 2018-06-19 PROCEDURE — 99213 OFFICE O/P EST LOW 20 MIN: CPT | Performed by: INTERNAL MEDICINE

## 2018-06-19 PROCEDURE — 36416 COLLJ CAPILLARY BLOOD SPEC: CPT | Performed by: INTERNAL MEDICINE

## 2018-06-19 PROCEDURE — 36415 COLL VENOUS BLD VENIPUNCTURE: CPT

## 2018-06-19 PROCEDURE — 82962 GLUCOSE BLOOD TEST: CPT | Performed by: INTERNAL MEDICINE

## 2018-06-19 PROCEDURE — 83036 HEMOGLOBIN GLYCOSYLATED A1C: CPT | Performed by: INTERNAL MEDICINE

## 2018-06-19 PROCEDURE — 86706 HEP B SURFACE ANTIBODY: CPT

## 2018-06-19 PROCEDURE — 99212 OFFICE O/P EST SF 10 MIN: CPT | Performed by: INTERNAL MEDICINE

## 2018-06-19 NOTE — PROGRESS NOTES
Return Office Visit - Diabetes    CHIEF COMPLAINT:    DM   Dyslipidemia  Obesity    HISTORY OF PRESENT ILLNESS:   Chantel Brandt is a 28year old female who presents for follow up for diabetes.     DM HISTORY  Diagnosed: at age 26    FH of DM: Parents have RYANN PLUS) In Vitro Strip Check BG 4 times a day Disp: 100 each Rfl: 3   aspirin 81 MG Oral Tab Take 81 mg by mouth daily. Disp:  Rfl:    ibuprofen (MOTRIN) 200 MG Oral Tab Take 200 mg by mouth every 6 (six) hours as needed.  Disp:  Rfl:    Nystatin Does n wheezing  CARDIOVASCULAR:  regular rate and rhythm, normal S1 and S2  ABDOMEN:  normal bowel sounds, soft, non-distended, non-tender  SKIN:  no bruising or bleeding, no rashes and no lesions  DIABETIC FOOT EXAM: normal monofilament sensation, normal pulses daily.  4. Obesity  She has lost some weight with lifestyle changes, but has gained weight now. -Dietary changes  a) Calorie restriction to achieve 5-10 % weight loss. 1200 calorie diet. -Exercise  a) Resume exercise. RTC in 3-4 months.

## 2018-06-20 ENCOUNTER — TELEPHONE (OUTPATIENT)
Dept: INTERNAL MEDICINE CLINIC | Facility: CLINIC | Age: 33
End: 2018-06-20

## 2018-06-20 NOTE — TELEPHONE ENCOUNTER
Please notify patient hat she is not immune to hepatitis B; we should have her complete the hepatitis B series. Please schedule her.

## 2018-07-03 ENCOUNTER — OFFICE VISIT (OUTPATIENT)
Dept: SURGERY | Facility: CLINIC | Age: 33
End: 2018-07-03

## 2018-07-03 VITALS
RESPIRATION RATE: 16 BRPM | HEART RATE: 91 BPM | DIASTOLIC BLOOD PRESSURE: 78 MMHG | WEIGHT: 286.38 LBS | OXYGEN SATURATION: 98 % | SYSTOLIC BLOOD PRESSURE: 120 MMHG | HEIGHT: 64 IN | BODY MASS INDEX: 48.89 KG/M2

## 2018-07-03 DIAGNOSIS — E11.9 TYPE 2 DIABETES MELLITUS WITHOUT COMPLICATION, WITHOUT LONG-TERM CURRENT USE OF INSULIN (HCC): ICD-10-CM

## 2018-07-03 DIAGNOSIS — E66.01 MORBID OBESITY WITH BMI OF 45.0-49.9, ADULT (HCC): Primary | ICD-10-CM

## 2018-07-03 DIAGNOSIS — E78.5 DYSLIPIDEMIA: ICD-10-CM

## 2018-07-03 DIAGNOSIS — R63.2 INCREASED APPETITE: ICD-10-CM

## 2018-07-03 PROCEDURE — 99213 OFFICE O/P EST LOW 20 MIN: CPT | Performed by: NURSE PRACTITIONER

## 2018-07-03 NOTE — PROGRESS NOTES
[address] [place] [address] Shen Loving, [address] [address] [place], [address]  [address]  Dept: [phone]     Date:   7/3/2018    Patient:  Ajay Wallace  :      3/30/1985  MRN:      [id number]    Chief Compla 0   famoTIDine 20 MG Oral Tab Take 1 tablet (20 mg total) by mouth daily. Disp: 30 tablet Rfl: 3   METFORMIN HCL 1000 MG Oral Tab TAKE 1 TABLET (1,000 MG TOTAL) BY MOUTH 2 (TWO) TIMES DAILY.  Disp: 180 tablet Rfl: 1   BD PEN NEEDLE MIGUEL U/F 32G X 4 MM Does MD;  Location: [facility]  1990: HC IMPLANT EAR TUBES  2002: MOLECULAR EXTRACTION (M11E.1)      Comment: wisdom teeth.   No date: OTHER SURGICAL HISTORY      Comment: wisdom teeth removed  No date: OTHER SURGICAL HISTORY      Comment: ama Negative. Gastrointestinal: Negative. Genitourinary: Negative. Musculoskeletal: Negative. Skin: Negative. Neurological: Negative. Psychiatric/Behavioral: Negative.         Physical Exam:   Physical Exam   Constitutional: She is oriented to

## 2018-07-05 ENCOUNTER — OFFICE VISIT (OUTPATIENT)
Dept: INTERNAL MEDICINE CLINIC | Facility: CLINIC | Age: 33
End: 2018-07-05

## 2018-07-05 ENCOUNTER — TELEPHONE (OUTPATIENT)
Dept: INTERNAL MEDICINE CLINIC | Facility: CLINIC | Age: 33
End: 2018-07-05

## 2018-07-05 VITALS
HEIGHT: 64 IN | BODY MASS INDEX: 48.65 KG/M2 | SYSTOLIC BLOOD PRESSURE: 100 MMHG | HEART RATE: 72 BPM | WEIGHT: 285 LBS | DIASTOLIC BLOOD PRESSURE: 60 MMHG | TEMPERATURE: 99 F

## 2018-07-05 DIAGNOSIS — Z23 NEED FOR VACCINATION: ICD-10-CM

## 2018-07-05 DIAGNOSIS — R10.32 LEFT LOWER QUADRANT PAIN: ICD-10-CM

## 2018-07-05 DIAGNOSIS — E16.2 HYPOGLYCEMIA: ICD-10-CM

## 2018-07-05 DIAGNOSIS — K21.9 GASTROESOPHAGEAL REFLUX DISEASE, ESOPHAGITIS PRESENCE NOT SPECIFIED: Primary | ICD-10-CM

## 2018-07-05 LAB
GLUCOSE BLOOD: 83
TEST STRIP LOT #: NORMAL NUMERIC

## 2018-07-05 PROCEDURE — 36416 COLLJ CAPILLARY BLOOD SPEC: CPT | Performed by: INTERNAL MEDICINE

## 2018-07-05 PROCEDURE — 90471 IMMUNIZATION ADMIN: CPT | Performed by: INTERNAL MEDICINE

## 2018-07-05 PROCEDURE — 82962 GLUCOSE BLOOD TEST: CPT | Performed by: INTERNAL MEDICINE

## 2018-07-05 PROCEDURE — 90746 HEPB VACCINE 3 DOSE ADULT IM: CPT | Performed by: INTERNAL MEDICINE

## 2018-07-05 PROCEDURE — 99212 OFFICE O/P EST SF 10 MIN: CPT | Performed by: INTERNAL MEDICINE

## 2018-07-05 PROCEDURE — 90632 HEPA VACCINE ADULT IM: CPT | Performed by: INTERNAL MEDICINE

## 2018-07-05 PROCEDURE — 90472 IMMUNIZATION ADMIN EACH ADD: CPT | Performed by: INTERNAL MEDICINE

## 2018-07-05 PROCEDURE — 99214 OFFICE O/P EST MOD 30 MIN: CPT | Performed by: INTERNAL MEDICINE

## 2018-07-05 RX ORDER — NICOTINE POLACRILEX 4 MG/1
1 GUM, CHEWING ORAL 2 TIMES DAILY
Qty: 180 TABLET | Refills: 0 | Status: SHIPPED | OUTPATIENT
Start: 2018-07-05 | End: 2018-10-05

## 2018-07-05 NOTE — TELEPHONE ENCOUNTER
Pt said that her medication for heart burn was recently changed. She started the new one (doesn't know the names, she thinks the old one starts with \"O\"), and now the heartburn is worse.  It was uncomfortable but manageable at first; and last night and to

## 2018-07-05 NOTE — TELEPHONE ENCOUNTER
Please advise - called patient who states she started famotidine 20 mg a couple of days. First it seemed to work , yesterday she had coffee ,some food. When driving the heart burn got really bad , \"burning in chest\" - to DR. Bay Scott

## 2018-07-05 NOTE — PROGRESS NOTES
Adam Tom is a 28year old female. Patient presents with:  Heartburn: was witched to new heartburn medication and  now symptoms are worse      HPI:   Adam Tom is a 28year old female who presents for: heartburn, abdominal pain, vaccination.      R RYANN PLUS) In Vitro Strip Check BG 4 times a day Disp: 100 each Rfl: 3   aspirin 81 MG Oral Tab Take 81 mg by mouth daily. Disp:  Rfl:    ibuprofen (MOTRIN) 200 MG Oral Tab Take 200 mg by mouth every 6 (six) hours as needed.  Disp:  Rfl:       Past Medical otherwise  LUNGS: denies shortness of breath with exertion, wheezing, cough, or sputum production  CARDIOVASCULAR: denies chest pain, pressure, or palpitations  GI: reports abdominal pain, heartburn. Denies constipation, diarrhea, nausea.        EXAM:   BP

## 2018-07-11 RX ORDER — LIRAGLUTIDE 6 MG/ML
INJECTION SUBCUTANEOUS
Qty: 27 ML | Refills: 0 | Status: SHIPPED | OUTPATIENT
Start: 2018-07-11 | End: 2018-10-13

## 2018-08-06 ENCOUNTER — NURSE ONLY (OUTPATIENT)
Dept: INTERNAL MEDICINE CLINIC | Facility: CLINIC | Age: 33
End: 2018-08-06
Payer: COMMERCIAL

## 2018-08-06 DIAGNOSIS — Z23 IMMUNIZATION DUE: Primary | ICD-10-CM

## 2018-08-06 PROCEDURE — 90471 IMMUNIZATION ADMIN: CPT | Performed by: INTERNAL MEDICINE

## 2018-08-06 PROCEDURE — 90746 HEPB VACCINE 3 DOSE ADULT IM: CPT | Performed by: INTERNAL MEDICINE

## 2018-08-06 NOTE — PROGRESS NOTES
Name and  verified. Hep B 2nd dose administered right deltoid Pt tolerated w/o complaint. No adverse reactions noted.

## 2018-08-20 RX ORDER — GLIMEPIRIDE 4 MG/1
8 TABLET ORAL
Qty: 180 TABLET | Refills: 0 | Status: SHIPPED
Start: 2018-08-20 | End: 2018-08-20

## 2018-08-20 RX ORDER — GLIMEPIRIDE 4 MG/1
8 TABLET ORAL
Qty: 180 TABLET | Refills: 0 | Status: SHIPPED | OUTPATIENT
Start: 2018-08-20 | End: 2019-02-04

## 2018-08-20 NOTE — TELEPHONE ENCOUNTER
From: Brinda Galeana  Sent: 8/20/2018 7:31 AM CDT  Subject: Medication Renewal Request    Hugo Raymundo would like a refill of the following medications:     GLIMEPIRIDE 4 MG Oral Tab Vika Ray MD]    Preferred pharmacy: [facility]

## 2018-08-21 ENCOUNTER — OFFICE VISIT (OUTPATIENT)
Dept: SURGERY | Facility: CLINIC | Age: 33
End: 2018-08-21
Payer: COMMERCIAL

## 2018-08-21 VITALS
HEIGHT: 64 IN | BODY MASS INDEX: 49.34 KG/M2 | OXYGEN SATURATION: 97 % | WEIGHT: 289 LBS | SYSTOLIC BLOOD PRESSURE: 130 MMHG | DIASTOLIC BLOOD PRESSURE: 78 MMHG | RESPIRATION RATE: 18 BRPM | HEART RATE: 91 BPM

## 2018-08-21 DIAGNOSIS — E78.5 DYSLIPIDEMIA: ICD-10-CM

## 2018-08-21 DIAGNOSIS — E66.01 MORBID OBESITY WITH BMI OF 45.0-49.9, ADULT (HCC): Primary | ICD-10-CM

## 2018-08-21 DIAGNOSIS — E11.9 TYPE 2 DIABETES MELLITUS WITHOUT COMPLICATION, WITHOUT LONG-TERM CURRENT USE OF INSULIN (HCC): ICD-10-CM

## 2018-08-21 PROCEDURE — 99213 OFFICE O/P EST LOW 20 MIN: CPT | Performed by: NURSE PRACTITIONER

## 2018-08-21 NOTE — PROGRESS NOTES
[address], [address] [place] [address] Diaz Hugo, [address] [address] [place], [address]  [address]  Dept: [phone]     Date:   18    Patient:  Calli Pimentel  :      3/30/1985  MRN:      [id number]    Chief Complai PLUS) W/DEVICE Does not apply Kit Use meter to check blood glucose as instructed Disp: 1 kit Rfl: 0   ACCU-CHEK FASTCLIX LANCETS Does not apply Misc Check BG 4 times a day Disp: 100 each Rfl: 3   Glucose Blood (ACCU-CHEK RYANN PLUS) In Vitro Strip Check BG Family History   Problem Relation Age of Onset   • Diabetes Father 36   • Hypertension Father    • Diabetes Mother 36   • Seizure Disorder Brother    • Cancer Maternal Grandmother      unknown   • Cancer Paternal Uncle      lung, smoker       Food True [place] Psychiatric/Behavioral: Negative. Physical Exam:   Physical Exam   Constitutional: She is oriented to person, place, and time. She appears well-developed and well-nourished. No distress. HENT:   Head: Normocephalic and atraumatic.    Neck: Neck s Increase aerobic exercises (goal is 150 minutes per week)  4. Increase fruit and vegetable servings/day  5. Keep carbs at or below 100g/day  6. Avoid skipping meals  7.  Prepare meals and snacks in advance    Counseled Patient on comprehensive weight loss p

## 2018-09-13 RX ORDER — PEN NEEDLE, DIABETIC 32GX 5/32"
NEEDLE, DISPOSABLE MISCELLANEOUS
Qty: 100 EACH | Refills: 0 | Status: SHIPPED | OUTPATIENT
Start: 2018-09-13 | End: 2019-01-17

## 2018-09-18 ENCOUNTER — OFFICE VISIT (OUTPATIENT)
Dept: ENDOCRINOLOGY CLINIC | Facility: CLINIC | Age: 33
End: 2018-09-18
Payer: COMMERCIAL

## 2018-09-18 VITALS
HEIGHT: 64 IN | WEIGHT: 291 LBS | BODY MASS INDEX: 49.68 KG/M2 | HEART RATE: 88 BPM | SYSTOLIC BLOOD PRESSURE: 130 MMHG | DIASTOLIC BLOOD PRESSURE: 94 MMHG | TEMPERATURE: 98 F | RESPIRATION RATE: 16 BRPM

## 2018-09-18 DIAGNOSIS — E11.9 CONTROLLED TYPE 2 DIABETES MELLITUS WITHOUT COMPLICATION, WITHOUT LONG-TERM CURRENT USE OF INSULIN (HCC): Primary | ICD-10-CM

## 2018-09-18 LAB
CARTRIDGE LOT#: ABNORMAL NUMERIC
GLUCOSE BLOOD: 88
HEMOGLOBIN A1C: 7.1 % (ref 4.3–5.6)
TEST STRIP LOT #: NORMAL NUMERIC

## 2018-09-18 PROCEDURE — 83036 HEMOGLOBIN GLYCOSYLATED A1C: CPT | Performed by: INTERNAL MEDICINE

## 2018-09-18 PROCEDURE — 99212 OFFICE O/P EST SF 10 MIN: CPT | Performed by: INTERNAL MEDICINE

## 2018-09-18 PROCEDURE — 99213 OFFICE O/P EST LOW 20 MIN: CPT | Performed by: INTERNAL MEDICINE

## 2018-09-18 PROCEDURE — 82962 GLUCOSE BLOOD TEST: CPT | Performed by: INTERNAL MEDICINE

## 2018-09-18 PROCEDURE — 36416 COLLJ CAPILLARY BLOOD SPEC: CPT | Performed by: INTERNAL MEDICINE

## 2018-09-18 RX ORDER — LISINOPRIL 2.5 MG/1
2.5 TABLET ORAL DAILY
COMMUNITY
End: 2018-11-15

## 2018-09-18 NOTE — PROGRESS NOTES
Return Office Visit - Diabetes    CHIEF COMPLAINT:    DM   Dyslipidemia      HISTORY OF PRESENT ILLNESS:   Erasmo Richter is a 28year old female who presents for follow up for diabetes. DM HISTORY  Diagnosed: at age 26    FH of DM: Parents have DM. Check BG 4 times a day Disp: 100 each Rfl: 3   aspirin 81 MG Oral Tab Take 81 mg by mouth daily. Disp:  Rfl:    ibuprofen (MOTRIN) 200 MG Oral Tab Take 200 mg by mouth every 6 (six) hours as needed.  Disp:  Rfl:        PAST MEDICAL, SOCIAL AND FAMILY HISTOR lesions  DIABETIC FOOT EXAM: normal monofilament sensation, normal pulses, no edema, no skin lesions      DATA:     BG reviewed.              ASSESSMENT AND PLAN:                1. Type 2 DM: A1c is better    Discussed compliance with diet and the importanc

## 2018-09-25 ENCOUNTER — OFFICE VISIT (OUTPATIENT)
Dept: SURGERY | Facility: CLINIC | Age: 33
End: 2018-09-25
Payer: COMMERCIAL

## 2018-09-25 VITALS
OXYGEN SATURATION: 98 % | RESPIRATION RATE: 16 BRPM | HEART RATE: 102 BPM | HEIGHT: 64 IN | SYSTOLIC BLOOD PRESSURE: 140 MMHG | WEIGHT: 289 LBS | DIASTOLIC BLOOD PRESSURE: 80 MMHG | BODY MASS INDEX: 49.34 KG/M2

## 2018-09-25 DIAGNOSIS — E66.01 MORBID OBESITY WITH BMI OF 45.0-49.9, ADULT (HCC): Primary | ICD-10-CM

## 2018-09-25 DIAGNOSIS — E11.9 TYPE 2 DIABETES MELLITUS WITHOUT COMPLICATION, WITHOUT LONG-TERM CURRENT USE OF INSULIN (HCC): ICD-10-CM

## 2018-09-25 DIAGNOSIS — E78.5 DYSLIPIDEMIA: ICD-10-CM

## 2018-09-25 PROCEDURE — 99213 OFFICE O/P EST LOW 20 MIN: CPT | Performed by: NURSE PRACTITIONER

## 2018-09-25 NOTE — PROGRESS NOTES
[address], [address] [place] [address], [address] [address] [address]  [address]  Dept: [phone]     Date:   18    Patient:  Francine Gaffney  :      3/30/1985  MRN:      [id number]    Chief Complai Oral Tab TAKE 1 TABLET (40 MG TOTAL) BY MOUTH NIGHTLY.  Disp: 90 tablet Rfl: 3   Blood Glucose Monitoring Suppl (ACCU-CHEK RYANN PLUS) W/DEVICE Does not apply Kit Use meter to check blood glucose as instructed Disp: 1 kit Rfl: 0   ACCU-CHEK FASTCLIX LANCETS Asked        Stress Concern: Not Asked        Weight Concern: Not Asked        Special Diet: Not Asked        Back Care: Not Asked        Exercise: Not Asked        Bike Helmet: Not Asked        Seat Belt: Not Asked        Self-Exams: Not Asked    Social H for  30 Minutes    Eating Habits  · Patient states the following:  · Eats 3 meal(s) per day  · Length of time it takes to consume a meal:  15 min  · # of snacks per day: 1-2 Type of snacks:  bananas  · Amount of soda consumption per day:  none  · Amount of Psychiatric: She has a normal mood and affect. Her behavior is normal. Judgment and thought content normal.   Vitals reviewed. ASSESSMENT     Encounter Diagnosis(ses):    Morbid obesity with bmi of 45.0-49.9, adult ([facility])  (primary encounter diagnosis) 15 minutes    Follow up in 6 weeks- advised [place] to bring her food log/meal plans    MARYAM Wade  9/25/18

## 2018-09-25 NOTE — PATIENT INSTRUCTIONS
Goals for next month:  1. Keep a food log using [facility]  2. Drink 48-64 ounces of water per day. No fruit juices or regular soda. 3. Increase aerobic exercises (goal is 150 minutes per week)  4. Increase fruit and vegetable servings/day  5.  Keep carbs at or b

## 2018-10-05 DIAGNOSIS — K21.9 GASTROESOPHAGEAL REFLUX DISEASE, ESOPHAGITIS PRESENCE NOT SPECIFIED: ICD-10-CM

## 2018-10-05 RX ORDER — OMEPRAZOLE 20 MG/1
20 CAPSULE, DELAYED RELEASE ORAL EVERY MORNING
Qty: 90 CAPSULE | Refills: 1 | Status: SHIPPED | OUTPATIENT
Start: 2018-10-05 | End: 2019-06-14

## 2018-10-05 NOTE — TELEPHONE ENCOUNTER
Per 's last office visit note (7/5/18),   \"1. Gastroesophageal reflux disease, esophagitis presence not specified  Start omeprazole twice daily for 2 weeks then decrease to once daily. Stop pepcid.  Ok to take tums here and there until medication ki

## 2018-10-15 RX ORDER — LIRAGLUTIDE 6 MG/ML
INJECTION SUBCUTANEOUS
Qty: 27 ML | Refills: 1 | Status: SHIPPED | OUTPATIENT
Start: 2018-10-15 | End: 2019-02-15

## 2018-10-22 RX ORDER — CANAGLIFLOZIN 300 MG/1
TABLET, FILM COATED ORAL
Qty: 90 TABLET | Refills: 0 | Status: SHIPPED | OUTPATIENT
Start: 2018-10-22 | End: 2019-01-17

## 2018-11-14 DIAGNOSIS — E11.9 TYPE 2 DIABETES MELLITUS WITHOUT COMPLICATION, WITHOUT LONG-TERM CURRENT USE OF INSULIN (HCC): Primary | ICD-10-CM

## 2018-11-14 RX ORDER — GLIMEPIRIDE 4 MG/1
8 TABLET ORAL
Qty: 180 TABLET | Refills: 0 | Status: SHIPPED | OUTPATIENT
Start: 2018-11-14 | End: 2019-02-04

## 2018-11-15 NOTE — TELEPHONE ENCOUNTER
See 5/1/18 encounter- could you please clarify the lisinopril dosage. Patient states she takes 2.5 mg daily but Rx sent 5/4/18 was for 5 mg daily. Now patient is requesting 2.5mg again.

## 2018-11-16 NOTE — TELEPHONE ENCOUNTER
Pt is calling to check on the status of the call from yesterday, please call [phone]     Tasked to rx low

## 2018-11-19 RX ORDER — LISINOPRIL 2.5 MG/1
2.5 TABLET ORAL DAILY
Qty: 90 TABLET | Refills: 3 | Status: SHIPPED | OUTPATIENT
Start: 2018-11-19 | End: 2019-10-27

## 2019-01-07 ENCOUNTER — NURSE ONLY (OUTPATIENT)
Dept: INTERNAL MEDICINE CLINIC | Facility: CLINIC | Age: 34
End: 2019-01-07
Payer: COMMERCIAL

## 2019-01-07 PROCEDURE — 90746 HEPB VACCINE 3 DOSE ADULT IM: CPT | Performed by: INTERNAL MEDICINE

## 2019-01-07 PROCEDURE — 90471 IMMUNIZATION ADMIN: CPT | Performed by: INTERNAL MEDICINE

## 2019-01-17 RX ORDER — PEN NEEDLE, DIABETIC 32GX 5/32"
NEEDLE, DISPOSABLE MISCELLANEOUS
Qty: 100 EACH | Refills: 0 | Status: SHIPPED | OUTPATIENT
Start: 2019-01-17 | End: 2021-11-17

## 2019-01-17 RX ORDER — CANAGLIFLOZIN 300 MG/1
TABLET, FILM COATED ORAL
Qty: 90 TABLET | Refills: 0 | Status: SHIPPED | OUTPATIENT
Start: 2019-01-17 | End: 2019-02-27

## 2019-01-18 ENCOUNTER — TELEPHONE (OUTPATIENT)
Dept: ENDOCRINOLOGY CLINIC | Facility: CLINIC | Age: 34
End: 2019-01-18

## 2019-01-18 NOTE — TELEPHONE ENCOUNTER
Pharmacy states PA is needed for Gricelda Bib Suh . Please call thank you [phone] EX#[phone]            Current Outpatient Medications:     •  VICTOZA 18 MG/3ML Subcutaneous Solution Pen-injector, INJECT 1.8 MG INTO THE SKIN DAILY. , Disp: 27 mL, Rfl: 1

## 2019-01-18 NOTE — TELEPHONE ENCOUNTER
PA for invokana submitted with Key below:    Medication PA Requested: Invokana                                                    Pt insurance/number to contact:  CoverMyMeds Used: yes   Key: see below  Insurance ID# and group: [facility]  Dx.: DM type 2  Medicat

## 2019-01-18 NOTE — TELEPHONE ENCOUNTER
Current Outpatient Medications:  INVOKANA 300 MG Oral Tab TAKE 1 TABLET BY MOUTH EVERY DAY Disp: 90 tablet Rfl: 0     Covermymeds PA KEy [id number]

## 2019-01-29 NOTE — TELEPHONE ENCOUNTER
Spoke with pharmacy. Pharmacist states Victoza still being rejected by insurance. States patient does not meet step-therapy requirement. Per chart notes, patient has tried and failed 1.2 mg SQ dose. Will resubmit PA.  Covermymeds burden: Katy Chowdary

## 2019-01-31 NOTE — TELEPHONE ENCOUNTER
Spoke with [facility] [phone]. Victoza is approved ([id number]), however, script needs to be run as 3 pens per 30 days. Notified pharmacy. Pharmacist Sonia Foster at [phone] still states she is getting rejection from insurance.  She tomas

## 2019-02-01 NOTE — TELEPHONE ENCOUNTER
Left detailed message for pharmacy that they should be able to run the script for 9 mL per 30 days. It is approved.

## 2019-02-04 DIAGNOSIS — E11.9 TYPE 2 DIABETES MELLITUS WITHOUT COMPLICATION, WITHOUT LONG-TERM CURRENT USE OF INSULIN (HCC): ICD-10-CM

## 2019-02-04 RX ORDER — GLIMEPIRIDE 4 MG/1
8 TABLET ORAL
Qty: 180 TABLET | Refills: 0 | Status: SHIPPED | OUTPATIENT
Start: 2019-02-04 | End: 2019-02-27

## 2019-02-11 ENCOUNTER — OFFICE VISIT (OUTPATIENT)
Dept: INTERNAL MEDICINE CLINIC | Facility: CLINIC | Age: 34
End: 2019-02-11
Payer: COMMERCIAL

## 2019-02-11 VITALS
SYSTOLIC BLOOD PRESSURE: 132 MMHG | HEIGHT: 64 IN | HEART RATE: 79 BPM | OXYGEN SATURATION: 99 % | DIASTOLIC BLOOD PRESSURE: 88 MMHG | BODY MASS INDEX: 50.02 KG/M2 | TEMPERATURE: 98 F | WEIGHT: 293 LBS | RESPIRATION RATE: 12 BRPM

## 2019-02-11 DIAGNOSIS — E11.9 TYPE 2 DIABETES MELLITUS WITHOUT COMPLICATION, UNSPECIFIED WHETHER LONG TERM INSULIN USE (HCC): ICD-10-CM

## 2019-02-11 DIAGNOSIS — K21.9 GASTROESOPHAGEAL REFLUX DISEASE, ESOPHAGITIS PRESENCE NOT SPECIFIED: ICD-10-CM

## 2019-02-11 DIAGNOSIS — Z00.00 ANNUAL PHYSICAL EXAM: Primary | ICD-10-CM

## 2019-02-11 DIAGNOSIS — I10 ESSENTIAL HYPERTENSION: ICD-10-CM

## 2019-02-11 DIAGNOSIS — E78.00 PURE HYPERCHOLESTEROLEMIA: ICD-10-CM

## 2019-02-11 DIAGNOSIS — Z12.4 SCREENING FOR CERVICAL CANCER: ICD-10-CM

## 2019-02-11 PROCEDURE — 99395 PREV VISIT EST AGE 18-39: CPT | Performed by: INTERNAL MEDICINE

## 2019-02-11 RX ORDER — IBUPROFEN 800 MG/1
800 TABLET ORAL DAILY PRN
Qty: 30 TABLET | Refills: 2 | Status: SHIPPED | OUTPATIENT
Start: 2019-02-11 | End: 2019-10-11 | Stop reason: ALTCHOICE

## 2019-02-12 NOTE — PROGRESS NOTES
Kevin العراقي is a 28year old female. Patient presents with: Annual: Annual physical and pap smear.       HPI:   Kevin العراقي is a 28year old female who presents for a complete physical exam.       Past medical history includes diabetes, dyslipidemia, (ACCU-CHEK RYANN PLUS) In Vitro Strip Check BG 4 times a day Disp: 100 each Rfl: 3   aspirin 81 MG Oral Tab Take 81 mg by mouth daily. Disp:  Rfl:    ibuprofen (MOTRIN) 200 MG Oral Tab Take 200 mg by mouth every 6 (six) hours as needed.  Disp:  Rfl:    Exen 0.5 pack q 2 wks    Alcohol use:  Yes      Alcohol/week: 0.6 oz      Types: 1 Standard drinks or equivalent per week      Comment: 1-2 drinks/week    Drug use: No         REVIEW OF SYSTEMS:   GENERAL: feels well otherwise  SKIN: denies any unusual skin lesi daily    Lucillie Rosendo, DO

## 2019-02-13 LAB — LAST PAP RESULT: NORMAL

## 2019-02-15 ENCOUNTER — OFFICE VISIT (OUTPATIENT)
Dept: ENDOCRINOLOGY CLINIC | Facility: CLINIC | Age: 34
End: 2019-02-15
Payer: COMMERCIAL

## 2019-02-15 ENCOUNTER — APPOINTMENT (OUTPATIENT)
Dept: LAB | Facility: HOSPITAL | Age: 34
End: 2019-02-15
Attending: INTERNAL MEDICINE
Payer: COMMERCIAL

## 2019-02-15 VITALS
DIASTOLIC BLOOD PRESSURE: 80 MMHG | HEART RATE: 82 BPM | WEIGHT: 293 LBS | BODY MASS INDEX: 51 KG/M2 | SYSTOLIC BLOOD PRESSURE: 117 MMHG

## 2019-02-15 DIAGNOSIS — E78.5 DYSLIPIDEMIA: ICD-10-CM

## 2019-02-15 DIAGNOSIS — Z00.00 ANNUAL PHYSICAL EXAM: ICD-10-CM

## 2019-02-15 DIAGNOSIS — E11.9 CONTROLLED TYPE 2 DIABETES MELLITUS WITHOUT COMPLICATION, WITHOUT LONG-TERM CURRENT USE OF INSULIN (HCC): Primary | ICD-10-CM

## 2019-02-15 DIAGNOSIS — E11.9 TYPE 2 DIABETES MELLITUS WITHOUT COMPLICATION, UNSPECIFIED WHETHER LONG TERM INSULIN USE (HCC): ICD-10-CM

## 2019-02-15 LAB
ALBUMIN SERPL-MCNC: 3.6 G/DL (ref 3.4–5)
ALBUMIN/GLOB SERPL: 1.1 {RATIO} (ref 1–2)
ALP LIVER SERPL-CCNC: 84 U/L (ref 37–98)
ALT SERPL-CCNC: 31 U/L (ref 13–56)
ANION GAP SERPL CALC-SCNC: 6 MMOL/L (ref 0–18)
AST SERPL-CCNC: 15 U/L (ref 15–37)
BILIRUB SERPL-MCNC: 0.3 MG/DL (ref 0.1–2)
BUN BLD-MCNC: 17 MG/DL (ref 7–18)
BUN/CREAT SERPL: 23.9 (ref 10–20)
CALCIUM BLD-MCNC: 8.7 MG/DL (ref 8.5–10.1)
CARTRIDGE LOT#: ABNORMAL NUMERIC
CHLORIDE SERPL-SCNC: 107 MMOL/L (ref 98–107)
CHOLEST SMN-MCNC: 144 MG/DL (ref ?–200)
CO2 SERPL-SCNC: 25 MMOL/L (ref 21–32)
CREAT BLD-MCNC: 0.71 MG/DL (ref 0.55–1.02)
CREAT UR-SCNC: 53.8 MG/DL
GLOBULIN PLAS-MCNC: 3.4 G/DL (ref 2.8–4.4)
GLUCOSE BLD-MCNC: 204 MG/DL (ref 70–99)
GLUCOSE BLOOD: 186
HDLC SERPL-MCNC: 59 MG/DL (ref 40–59)
HEMOGLOBIN A1C: 7.9 % (ref 4.3–5.6)
LDLC SERPL CALC-MCNC: 67 MG/DL (ref ?–100)
M PROTEIN MFR SERPL ELPH: 7 G/DL (ref 6.4–8.2)
MICROALBUMIN UR-MCNC: <0.5 MG/DL
NONHDLC SERPL-MCNC: 85 MG/DL (ref ?–130)
OSMOLALITY SERPL CALC.SUM OF ELEC: 293 MOSM/KG (ref 275–295)
POTASSIUM SERPL-SCNC: 4.2 MMOL/L (ref 3.5–5.1)
SODIUM SERPL-SCNC: 138 MMOL/L (ref 136–145)
TEST STRIP LOT #: NORMAL NUMERIC
TRIGL SERPL-MCNC: 90 MG/DL (ref 30–149)

## 2019-02-15 PROCEDURE — 82570 ASSAY OF URINE CREATININE: CPT

## 2019-02-15 PROCEDURE — 82043 UR ALBUMIN QUANTITATIVE: CPT

## 2019-02-15 PROCEDURE — 80053 COMPREHEN METABOLIC PANEL: CPT

## 2019-02-15 PROCEDURE — 36415 COLL VENOUS BLD VENIPUNCTURE: CPT

## 2019-02-15 PROCEDURE — 82962 GLUCOSE BLOOD TEST: CPT | Performed by: INTERNAL MEDICINE

## 2019-02-15 PROCEDURE — 83036 HEMOGLOBIN GLYCOSYLATED A1C: CPT | Performed by: INTERNAL MEDICINE

## 2019-02-15 PROCEDURE — 99213 OFFICE O/P EST LOW 20 MIN: CPT | Performed by: INTERNAL MEDICINE

## 2019-02-15 PROCEDURE — 36416 COLLJ CAPILLARY BLOOD SPEC: CPT | Performed by: INTERNAL MEDICINE

## 2019-02-15 PROCEDURE — 80061 LIPID PANEL: CPT

## 2019-02-15 PROCEDURE — 99212 OFFICE O/P EST SF 10 MIN: CPT | Performed by: INTERNAL MEDICINE

## 2019-02-15 NOTE — PROGRESS NOTES
Return Office Visit - Diabetes    CHIEF COMPLAINT:    DM   Dyslipidemia      HISTORY OF PRESENT ILLNESS:   Morelia Barnes is a 57 [address] old female who presents for follow up for diabetes. DM HISTORY  Diagnosed: at age 26    FH of DM: Parents have DM. W/DEVICE Does not apply Kit Use meter to check blood glucose as instructed Disp: 1 kit Rfl: 0   ACCU-CHEK FASTCLIX LANCETS Does not apply Misc Check BG 4 times a day Disp: 100 each Rfl: 3   Glucose Blood (ACCU-CHEK RYANN PLUS) In Vitro Strip Check BG 4 guille wheezing  CARDIOVASCULAR:  regular rate and rhythm, normal S1 and S2  ABDOMEN:  normal bowel sounds, soft, non-distended, non-tender  SKIN:  no bruising or bleeding, no rashes and no lesions  DIABETIC FOOT EXAM: normal monofilament sensation, normal pulses

## 2019-02-27 ENCOUNTER — PATIENT MESSAGE (OUTPATIENT)
Dept: ENDOCRINOLOGY CLINIC | Facility: CLINIC | Age: 34
End: 2019-02-27

## 2019-02-27 ENCOUNTER — TELEPHONE (OUTPATIENT)
Dept: ENDOCRINOLOGY CLINIC | Facility: CLINIC | Age: 34
End: 2019-02-27

## 2019-02-27 DIAGNOSIS — E11.9 TYPE 2 DIABETES MELLITUS WITHOUT COMPLICATION, WITHOUT LONG-TERM CURRENT USE OF INSULIN (HCC): ICD-10-CM

## 2019-02-27 DIAGNOSIS — E11.9 TYPE 2 DIABETES MELLITUS WITHOUT COMPLICATION, WITHOUT LONG-TERM CURRENT USE OF INSULIN (HCC): Primary | ICD-10-CM

## 2019-02-27 RX ORDER — GLIMEPIRIDE 4 MG/1
8 TABLET ORAL
Qty: 180 TABLET | Refills: 1 | Status: SHIPPED | OUTPATIENT
Start: 2019-02-27 | End: 2020-01-27

## 2019-02-27 RX ORDER — LANCETS
EACH MISCELLANEOUS
Qty: 102 EACH | Refills: 6 | Status: SHIPPED | OUTPATIENT
Start: 2019-02-27

## 2019-02-27 RX ORDER — BLOOD-GLUCOSE METER
1 EACH MISCELLANEOUS DAILY
Qty: 1 KIT | Refills: 0 | Status: SHIPPED | OUTPATIENT
Start: 2019-02-27

## 2019-02-27 RX ORDER — BLOOD SUGAR DIAGNOSTIC
STRIP MISCELLANEOUS
Qty: 400 STRIP | Refills: 0 | Status: SHIPPED | OUTPATIENT
Start: 2019-02-27

## 2019-02-27 NOTE — TELEPHONE ENCOUNTER
[facility] calling for mutual pt regarding rx:Test strips and Lancets, not cov by ins.  Requesting new script for new meter, Once Touch or non-branded

## 2019-02-27 NOTE — TELEPHONE ENCOUNTER
LOV 2/15/19 - F/U 5/21/19    OK to fill medication for 90 day supply per AM protocol.     (Pt will not have insurance for 3 mos.)

## 2019-02-27 NOTE — TELEPHONE ENCOUNTER
From: Morelia Barnes  To: Eveline Mcmahon MD  Sent: 2/27/2019 1:10 PM CST  Subject: Other    Hi Dr. Jacquelyn Raymundo, I an quitting my job, March 8th will be my last day. I will not have insurance for 3 months (maybe sooner if they think I'm good).  How c

## 2019-02-28 NOTE — TELEPHONE ENCOUNTER
From: Iftikhar Meade  To:  María English MD  Sent: 2/27/2019 6:33 PM CST  Subject: Other    Dr. Donato Ordonez,    I emailed you earlier and asked for refills for my medication since I will not have insurance for 3 months, and Levy Rossi said she did, and whe

## 2019-02-28 NOTE — TELEPHONE ENCOUNTER
Spoke with pharmacist - patient cannot refill until April or May due to insurance. A couple of her medications are available on the [facility] $4 list. Notified patient via [employer].

## 2019-03-04 ENCOUNTER — TELEPHONE (OUTPATIENT)
Dept: ENDOCRINOLOGY CLINIC | Facility: CLINIC | Age: 34
End: 2019-03-04

## 2019-03-04 NOTE — TELEPHONE ENCOUNTER
RN advised pharmacy not to fill with old pen (Bydureon) Bcise only - Pharm states they will fill 1 mo Bcise and by then hopefully they will have more.

## 2019-03-04 NOTE — TELEPHONE ENCOUNTER
Current Outpatient Medications:  Exenatide ER (BYDUREON BCISE) 2 MG/0.85ML Subcutaneous Auto-injector Inject 2 mg into the skin every 7 days.  Disp: 12 pen Rfl: 0     Per pharmacy bydureon auto injector is not avail to order may we substitute to reg byjosefinar

## 2019-04-23 ENCOUNTER — PATIENT MESSAGE (OUTPATIENT)
Dept: ENDOCRINOLOGY CLINIC | Facility: CLINIC | Age: 34
End: 2019-04-23

## 2019-04-24 NOTE — TELEPHONE ENCOUNTER
Patient requesting refill of Invokana 300mg, once daily. She is currently without insurance so she is asking for a little to no cost help. LOV 02/15/2019, F/U 05/21/2019 - routed to provider.

## 2019-04-24 NOTE — TELEPHONE ENCOUNTER
From: Misbah Wilhelm  To: Whitney Reynoso MD  Sent: 4/23/2019 4:09 PM CDT  Subject: Prescription Question    Hello,     I need to refill my invokana, but I am currently without insurance, can you please help me get it at little/to no cost? Maybe [facility]?

## 2019-04-25 NOTE — TELEPHONE ENCOUNTER
Dr Marbella Cardenas, please advise on Farxiga dosing and we will send to pharmacy w/ one month free coupon details.

## 2019-04-25 NOTE — TELEPHONE ENCOUNTER
Pt states she has 3 days left of Invokana and is ok with the plan to start Kirill Stanley when done with invokana. Pt started new job so she should have insurance around late June/July.

## 2019-04-25 NOTE — PROGRESS NOTES
Patient is on full dose invokana ----  Change to full dose Farxiga 10 mg po daily  Thank you   LG     Please notify pt/ update Pharmacy with coupon card     Review with patient MOA / SGLT2 and hydration/ risks/ benefits and hydration.    Thank you   [facility]

## 2019-04-26 ENCOUNTER — PATIENT MESSAGE (OUTPATIENT)
Dept: ENDOCRINOLOGY CLINIC | Facility: CLINIC | Age: 34
End: 2019-04-26

## 2019-04-26 NOTE — TELEPHONE ENCOUNTER
RN ordered Farxiga 10 mg per [facility]/ [facility] info on script - verified pharm rec'd it and will run today.  Pt made aware via [facility].

## 2019-04-26 NOTE — TELEPHONE ENCOUNTER
From: Tammy Castleman  To:  Corwin Pina MD  Sent: 4/26/2019 1:32 PM CDT  Subject: Prescription Question    I talked to the pharmacy, and they said that the coupon only works with insurance, and it would be over $300 without insurance

## 2019-04-26 NOTE — TELEPHONE ENCOUNTER
RN called pharm and provided correct codes for 1 mo free - pharm confirmed it went through for $0 cost. Pt made aware via [facility]

## 2019-05-08 ENCOUNTER — PATIENT MESSAGE (OUTPATIENT)
Dept: ENDOCRINOLOGY CLINIC | Facility: CLINIC | Age: 34
End: 2019-05-08

## 2019-05-08 NOTE — TELEPHONE ENCOUNTER
Patient requesting refill of MTF 1000mg, 1 po bid to [address] in [facility] still without insurance and is requesting this location.

## 2019-05-08 NOTE — TELEPHONE ENCOUNTER
From: Lynnette Rodriguez  To: Francine Peng MD  Sent: 5/8/2019 10:36 AM CDT  Subject: Other    Hello,     I need to get my metformin refilled. Since I'm still without insurance, can you please send my prescription to [facility] in [place]?     Donn Chambers

## 2019-06-04 ENCOUNTER — PATIENT MESSAGE (OUTPATIENT)
Dept: INTERNAL MEDICINE CLINIC | Facility: CLINIC | Age: 34
End: 2019-06-04

## 2019-06-04 NOTE — TELEPHONE ENCOUNTER
From: Geoff Grandchild  To: Jean Pierre Rosa DO  Sent: 6/4/2019 5:00 PM CDT  Subject: Other    Hey Dr. Saba Sanderson,    I am trying to get insurance with my new job, and we are trying to figure out if you can take [address]?  It's the cheapest insu

## 2019-06-06 NOTE — TELEPHONE ENCOUNTER
Left message on vms that [facility] Call back if she has any other plans or questions.

## 2019-06-13 ENCOUNTER — PATIENT MESSAGE (OUTPATIENT)
Dept: INTERNAL MEDICINE CLINIC | Facility: CLINIC | Age: 34
End: 2019-06-13

## 2019-06-13 DIAGNOSIS — K21.9 GASTROESOPHAGEAL REFLUX DISEASE, ESOPHAGITIS PRESENCE NOT SPECIFIED: ICD-10-CM

## 2019-06-13 NOTE — TELEPHONE ENCOUNTER
To marko to assist    Per 2/2019 OV: 5. GERD   Tried to stop PPI and try pepcid but symptoms returned.  Back on omeprazole daily

## 2019-06-13 NOTE — TELEPHONE ENCOUNTER
From: Rhina Jacome  To: Ita Rae DO  Sent: 6/13/2019 1:45 PM CDT  Subject: Prescription Matilde Ogden,    I have less than a week of my antacid medication, and my new insurance doesn't start until 7/1.  I don't know if the prescription is g

## 2019-06-14 RX ORDER — OMEPRAZOLE 20 MG/1
20 CAPSULE, DELAYED RELEASE ORAL EVERY MORNING
Qty: 30 CAPSULE | Refills: 3 | Status: SHIPPED | OUTPATIENT
Start: 2019-06-14 | End: 2019-11-07 | Stop reason: ALTCHOICE

## 2019-08-05 ENCOUNTER — PATIENT MESSAGE (OUTPATIENT)
Dept: ENDOCRINOLOGY CLINIC | Facility: CLINIC | Age: 34
End: 2019-08-05

## 2019-08-05 NOTE — TELEPHONE ENCOUNTER
From: Pravin Dickerson  To: Xena Garza MD  Sent: 8/5/2019 12:41 PM CDT  Subject: Prescription Question    Hello,    I was wondering if since I have insurance again, can I please go back to invokana since it was free?  Or do you happen to have a free co

## 2019-08-05 NOTE — TELEPHONE ENCOUNTER
RN replied to pt w/ savings card info. Pt will let us know if it does not go through and we can try alternative.

## 2019-09-23 ENCOUNTER — TELEPHONE (OUTPATIENT)
Dept: ENDOCRINOLOGY CLINIC | Facility: CLINIC | Age: 34
End: 2019-09-23

## 2019-09-23 NOTE — TELEPHONE ENCOUNTER
Reviewed endocrine encounters from 4/2019-present. Patient was on invokana but then lost insurance. She was switched to farxiga 10 mg daily so she could use the 1 month free coupon.  She then stayed on farxiga using the copay card after her insurance was re

## 2019-10-09 ENCOUNTER — TELEPHONE (OUTPATIENT)
Dept: INTERNAL MEDICINE CLINIC | Facility: CLINIC | Age: 34
End: 2019-10-09

## 2019-10-09 NOTE — TELEPHONE ENCOUNTER
Spoke with patient and she states that she has been experiencing left sided hip pain for the past month. No injuries. Pain initially occurred on/off but today it has been constant. Pain is an aching sensation.  Pain is slightly better when walking or stre

## 2019-10-09 NOTE — TELEPHONE ENCOUNTER
Pt. Is calling to speak with a nurse she is experiencing pain on her left side of her hip please advise pt. Will be unavailable from 2-3pm today ph.  # [phone]   Routed to clinical

## 2019-10-10 ENCOUNTER — PATIENT MESSAGE (OUTPATIENT)
Dept: INTERNAL MEDICINE CLINIC | Facility: CLINIC | Age: 34
End: 2019-10-10

## 2019-10-10 NOTE — TELEPHONE ENCOUNTER
Spoke to patient who is at her work doctor's office right now. She states they recommended a pelvic US for her pain. They did a urine test and she is waiting for the results. The pain is still coming on and off.  They did not recommend any medication to man

## 2019-10-10 NOTE — TELEPHONE ENCOUNTER
\"From: Doreen Cantu  To: Milo Baires DO  Sent: 10/10/2019 10:27 AM CDT  Subject: Other    Hello,      I called yesterday because I have been in pain.  I saw the doctor at work,  and they are suggesting I get a pelvic ultrasound.  Can someone please joy

## 2019-10-10 NOTE — TELEPHONE ENCOUNTER
Spoke with patient who reports when she was seen today she was told her blood sugar was high, but that her urine dip was normal other than some residual blood from her LMP. She states there is no way she could be pregnant, LMP on 10/6/19.  She has appt with

## 2019-10-10 NOTE — TELEPHONE ENCOUNTER
From: Spencer Goodpasture  To: Madhavi Ferris DO  Sent: 10/10/2019 10:27 AM CDT  Subject: Other    Hello,     I called yesterday because I have been in pain. I saw the doctor at work, and they are suggesting I get a pelvic ultrasound.  Can someone please call me t

## 2019-10-11 ENCOUNTER — OFFICE VISIT (OUTPATIENT)
Dept: INTERNAL MEDICINE CLINIC | Facility: CLINIC | Age: 34
End: 2019-10-11
Payer: COMMERCIAL

## 2019-10-11 ENCOUNTER — TELEPHONE (OUTPATIENT)
Dept: INTERNAL MEDICINE CLINIC | Facility: CLINIC | Age: 34
End: 2019-10-11

## 2019-10-11 VITALS
RESPIRATION RATE: 18 BRPM | BODY MASS INDEX: 55 KG/M2 | OXYGEN SATURATION: 98 % | TEMPERATURE: 98 F | HEART RATE: 86 BPM | WEIGHT: 293 LBS | SYSTOLIC BLOOD PRESSURE: 138 MMHG | DIASTOLIC BLOOD PRESSURE: 86 MMHG

## 2019-10-11 DIAGNOSIS — E11.9 TYPE 2 DIABETES MELLITUS WITHOUT COMPLICATION, UNSPECIFIED WHETHER LONG TERM INSULIN USE (HCC): ICD-10-CM

## 2019-10-11 DIAGNOSIS — Z00.00 ANNUAL PHYSICAL EXAM: Primary | ICD-10-CM

## 2019-10-11 DIAGNOSIS — K21.9 GASTROESOPHAGEAL REFLUX DISEASE, ESOPHAGITIS PRESENCE NOT SPECIFIED: ICD-10-CM

## 2019-10-11 DIAGNOSIS — M54.16 LUMBAR RADICULOPATHY: Primary | ICD-10-CM

## 2019-10-11 DIAGNOSIS — I10 ESSENTIAL HYPERTENSION: ICD-10-CM

## 2019-10-11 PROCEDURE — 99214 OFFICE O/P EST MOD 30 MIN: CPT | Performed by: INTERNAL MEDICINE

## 2019-10-11 RX ORDER — NAPROXEN 500 MG/1
500 TABLET ORAL 2 TIMES DAILY WITH MEALS
Qty: 60 TABLET | Refills: 0 | Status: SHIPPED | OUTPATIENT
Start: 2019-10-11 | End: 2019-11-02

## 2019-10-11 RX ORDER — FAMOTIDINE 20 MG/1
20 TABLET ORAL 2 TIMES DAILY
Qty: 60 TABLET | Refills: 0 | Status: SHIPPED | OUTPATIENT
Start: 2019-10-11 | End: 2019-11-02

## 2019-10-11 NOTE — TELEPHONE ENCOUNTER
From: Nicanor Jaimes  To: Erica Hollis DO  Sent: 10/10/2019 7:01 PM CDT  Subject: Other    Is there any way you can get me in tomorrow morning, early? Please call me asap if you can.  Thank you    Omar Frias

## 2019-10-11 NOTE — TELEPHONE ENCOUNTER
LMTCB with medical records second attempt. PHI release completed with patient; need fax # for [phone].

## 2019-10-11 NOTE — TELEPHONE ENCOUNTER
Per MD request please retreive medical records/[facility] with medical records.

## 2019-10-11 NOTE — PROGRESS NOTES
Julio Cesar Elizabeth is a 27year old female. Patient presents with:  ER F/U: Pt was in ER last night for LLQ abdominal of 5/10. States pain is sharp and stabbing in nature.        HPI:   Julio Cesar Elizabeth is a 27year old female who presents for: ER follow up    Has ACCU-CHEK FASTCLIX LANCETS Does not apply Misc Check BG 4 times a day Disp: 102 each Rfl: 6   Exenatide ER (BYDUREON BCISE) 2 MG/0.85ML Subcutaneous Auto-injector Inject 2 mg into the skin every 7 days.  Disp: 12 pen Rfl: 0   ONETOUCH DELICA LANCETS FINE Hypertension Father    • Diabetes Mother 36   • Seizure Disorder Brother    • Cancer Maternal Grandmother         unknown   • Cancer Paternal Uncle         lung, smoker      Social History:   Social History    Tobacco Use      Smoking status: Former Smoker any muscle tension. Discussed that a stand up desk would be helpful to allow her to work while sitting or standing, thus to alleviate pressure on her lower back from prolonged sitting. Restrictions will likely be long term as this can recur.  We also discus

## 2019-10-14 ENCOUNTER — PATIENT MESSAGE (OUTPATIENT)
Dept: INTERNAL MEDICINE CLINIC | Facility: CLINIC | Age: 34
End: 2019-10-14

## 2019-10-14 DIAGNOSIS — M54.9 MID BACK PAIN: Primary | ICD-10-CM

## 2019-10-14 DIAGNOSIS — M54.50 ACUTE LOW BACK PAIN WITHOUT SCIATICA, UNSPECIFIED BACK PAIN LATERALITY: ICD-10-CM

## 2019-10-14 NOTE — TELEPHONE ENCOUNTER
From: Iftikhar Meade  To: Jj Guillory DO  Sent: 10/14/2019 5:11 PM CDT  Subject: Other    Im a little confused by your last message. I thought the plan was to either get an MRI or xray of my back? Im taking the medication, no difference.  I can try thre hea

## 2019-10-14 NOTE — TELEPHONE ENCOUNTER
From: Julio Cesar Elizabeth  To: Mary Carmen Santana DO  Sent: 10/14/2019 10:11 AM CDT  Subject: Other    Hey Dr. Mirna Whipple     I spoke to mom and she said that I never had chicken pox    Kvng Cousins

## 2019-10-25 ENCOUNTER — TELEPHONE (OUTPATIENT)
Dept: INTERNAL MEDICINE CLINIC | Facility: CLINIC | Age: 34
End: 2019-10-25

## 2019-10-25 DIAGNOSIS — D64.9 ANEMIA, UNSPECIFIED TYPE: Primary | ICD-10-CM

## 2019-10-27 RX ORDER — LISINOPRIL 2.5 MG/1
TABLET ORAL
Qty: 90 TABLET | Refills: 3 | Status: SHIPPED | OUTPATIENT
Start: 2019-10-27 | End: 2020-09-25

## 2019-10-29 ENCOUNTER — TELEPHONE (OUTPATIENT)
Dept: ENDOCRINOLOGY CLINIC | Facility: CLINIC | Age: 34
End: 2019-10-29

## 2019-10-29 RX ORDER — DAPAGLIFLOZIN 10 MG/1
TABLET, FILM COATED ORAL
Qty: 30 TABLET | Refills: 0 | Status: SHIPPED | OUTPATIENT
Start: 2019-10-29 | End: 2019-11-21

## 2019-11-04 RX ORDER — FAMOTIDINE 20 MG/1
TABLET ORAL
Qty: 60 TABLET | Refills: 0 | Status: SHIPPED | OUTPATIENT
Start: 2019-11-04 | End: 2019-12-03

## 2019-11-04 RX ORDER — NAPROXEN 500 MG/1
TABLET ORAL
Qty: 60 TABLET | Refills: 0 | Status: SHIPPED | OUTPATIENT
Start: 2019-11-04 | End: 2021-01-08

## 2019-11-07 ENCOUNTER — OFFICE VISIT (OUTPATIENT)
Dept: INTERNAL MEDICINE CLINIC | Facility: CLINIC | Age: 34
End: 2019-11-07
Payer: COMMERCIAL

## 2019-11-07 ENCOUNTER — TELEPHONE (OUTPATIENT)
Dept: INTERNAL MEDICINE CLINIC | Facility: CLINIC | Age: 34
End: 2019-11-07

## 2019-11-07 VITALS
SYSTOLIC BLOOD PRESSURE: 126 MMHG | HEART RATE: 98 BPM | TEMPERATURE: 99 F | DIASTOLIC BLOOD PRESSURE: 94 MMHG | OXYGEN SATURATION: 98 % | BODY MASS INDEX: 54 KG/M2 | WEIGHT: 293 LBS

## 2019-11-07 DIAGNOSIS — Z00.00 ANNUAL PHYSICAL EXAM: ICD-10-CM

## 2019-11-07 DIAGNOSIS — I10 ESSENTIAL HYPERTENSION: ICD-10-CM

## 2019-11-07 DIAGNOSIS — H66.90 ACUTE OTITIS MEDIA, UNSPECIFIED OTITIS MEDIA TYPE: ICD-10-CM

## 2019-11-07 DIAGNOSIS — E78.5 DYSLIPIDEMIA: Primary | ICD-10-CM

## 2019-11-07 DIAGNOSIS — M54.16 LUMBAR RADICULOPATHY: ICD-10-CM

## 2019-11-07 DIAGNOSIS — E11.9 TYPE 2 DIABETES MELLITUS WITHOUT COMPLICATION, UNSPECIFIED WHETHER LONG TERM INSULIN USE (HCC): ICD-10-CM

## 2019-11-07 DIAGNOSIS — M54.6 ACUTE BILATERAL THORACIC BACK PAIN: ICD-10-CM

## 2019-11-07 PROCEDURE — 99395 PREV VISIT EST AGE 18-39: CPT | Performed by: INTERNAL MEDICINE

## 2019-11-07 RX ORDER — AMOXICILLIN AND CLAVULANATE POTASSIUM 875; 125 MG/1; MG/1
1 TABLET, FILM COATED ORAL 2 TIMES DAILY
Qty: 20 TABLET | Refills: 0 | Status: SHIPPED | OUTPATIENT
Start: 2019-11-07 | End: 2019-11-17

## 2019-11-07 NOTE — PROGRESS NOTES
Health screening form for patient's work completed by Dr Tom Chen and faxed to [phone]. Fax confirmation was received. Form sent to scanning.

## 2019-11-07 NOTE — PROGRESS NOTES
Ash Lopez is a 27year old female. Patient presents with: Follow - Up: Pain has improved with naproxen. Plans to get xray tomorrow. Is not checking blood sugar. Requests refill of metformin.  Is not taking exenatide  Ear Problem: Cannot hear out of lef daily. 1 kit 0   • Glucose Blood (ONETOUCH VERIO) In Vitro Strip Use to check blood sugar 4 times daily.  400 strip 0   • BD PEN NEEDLE MIGUEL U/F 32G X 4 MM Does not apply Misc INJECT ONCE DAILY 100 each 0   • ATORVASTATIN 40 MG Oral Tab TAKE 1 TABLET (40 MG Use      Smoking status: Former Smoker        Packs/day: 0.00        Years: 12.00        Pack years: 0        Types: Cigarettes        Quit date: 3/25/2014        Years since quittin.6      Smokeless tobacco: Never Used      Tobacco comment: 0.5 pack q Eye exam next month    3. Essential hypertension with goal blood pressure less than 130/80  Lisinopril 2.5mg daily    4.  Dyslipidemia  baby aspirin  ; pt has not been compliant with diet; would like to work on getting back on track, and then will

## 2019-11-08 ENCOUNTER — HOSPITAL ENCOUNTER (OUTPATIENT)
Dept: GENERAL RADIOLOGY | Facility: HOSPITAL | Age: 34
Discharge: HOME | End: 2019-11-08
Attending: INTERNAL MEDICINE
Payer: COMMERCIAL

## 2019-11-08 ENCOUNTER — OFFICE VISIT (OUTPATIENT)
Dept: ENDOCRINOLOGY CLINIC | Facility: CLINIC | Age: 34
End: 2019-11-08
Payer: COMMERCIAL

## 2019-11-08 ENCOUNTER — TELEPHONE (OUTPATIENT)
Dept: ENDOCRINOLOGY CLINIC | Facility: CLINIC | Age: 34
End: 2019-11-08

## 2019-11-08 VITALS
BODY MASS INDEX: 53 KG/M2 | SYSTOLIC BLOOD PRESSURE: 149 MMHG | DIASTOLIC BLOOD PRESSURE: 97 MMHG | WEIGHT: 293 LBS | HEART RATE: 85 BPM

## 2019-11-08 DIAGNOSIS — E11.65 TYPE 2 DIABETES MELLITUS WITH HYPERGLYCEMIA, UNSPECIFIED WHETHER LONG TERM INSULIN USE (HCC): ICD-10-CM

## 2019-11-08 DIAGNOSIS — M54.50 ACUTE LOW BACK PAIN WITHOUT SCIATICA, UNSPECIFIED BACK PAIN LATERALITY: ICD-10-CM

## 2019-11-08 DIAGNOSIS — E78.5 DYSLIPIDEMIA: Primary | ICD-10-CM

## 2019-11-08 DIAGNOSIS — M54.9 MID BACK PAIN: ICD-10-CM

## 2019-11-08 PROCEDURE — 99214 OFFICE O/P EST MOD 30 MIN: CPT | Performed by: INTERNAL MEDICINE

## 2019-11-08 PROCEDURE — 72100 X-RAY EXAM L-S SPINE 2/3 VWS: CPT | Performed by: INTERNAL MEDICINE

## 2019-11-08 PROCEDURE — 72072 X-RAY EXAM THORAC SPINE 3VWS: CPT | Performed by: INTERNAL MEDICINE

## 2019-11-08 PROCEDURE — 82962 GLUCOSE BLOOD TEST: CPT | Performed by: INTERNAL MEDICINE

## 2019-11-08 PROCEDURE — 36416 COLLJ CAPILLARY BLOOD SPEC: CPT | Performed by: INTERNAL MEDICINE

## 2019-11-08 RX ORDER — BLOOD-GLUCOSE METER
1 EACH MISCELLANEOUS ONCE
Qty: 1 KIT | Refills: 0 | Status: SHIPPED | OUTPATIENT
Start: 2019-11-08 | End: 2019-11-08

## 2019-11-08 RX ORDER — BLOOD SUGAR DIAGNOSTIC
STRIP MISCELLANEOUS
Qty: 200 STRIP | Refills: 0 | Status: SHIPPED | OUTPATIENT
Start: 2019-11-08 | End: 2020-02-03

## 2019-11-08 NOTE — PROGRESS NOTES
Return Office Visit - Diabetes    CHIEF COMPLAINT:    DM   Dyslipidemia      HISTORY OF PRESENT ILLNESS:   Kelly Soares is a 27year old female who presents for follow up for diabetes.       Changed jobs, did not have insurance and hence could not FU and a day 100 each 6   • glimepiride 4 MG Oral Tab Take 2 tablets (8 mg total) by mouth every morning before breakfast. 180 tablet 1   • ACCU-CHEK FASTCLIX LANCETS Does not apply Misc Check BG 4 times a day 102 each 6   • ONETOUCH DELICA LANCETS FINE Does not BP: (!) 149/97   Pulse: 85   Weight: (!) 311 lb 3.2 oz (141.2 kg)     BMI: Body mass index is 53.42 kg/m².      CONSTITUTIONAL:  awake, alert, cooperative, no apparent distress,  PSYCH: normal affect  EYES:  No proptosis, no ptosis, conjunctiva normal  EN modifications including reductions in dietary total and saturated fat, weight loss, aerobic exercise, and eating a diet rich in fruits and vegetables. B) Statin therapy: continue with atorvastatin 40 mg daily. Discussed SE.      She is not sexually active

## 2019-11-08 NOTE — TELEPHONE ENCOUNTER
[facility] msg sent to patient asking to come to todays appt earlier (11/8 at 12:40pm) as AM has to leave early.

## 2019-11-10 ENCOUNTER — PATIENT MESSAGE (OUTPATIENT)
Dept: INTERNAL MEDICINE CLINIC | Facility: CLINIC | Age: 34
End: 2019-11-10

## 2019-11-11 ENCOUNTER — PATIENT MESSAGE (OUTPATIENT)
Dept: INTERNAL MEDICINE CLINIC | Facility: CLINIC | Age: 34
End: 2019-11-11

## 2019-11-11 RX ORDER — FAMOTIDINE 20 MG/1
20 TABLET ORAL 2 TIMES DAILY
Qty: 60 TABLET | Refills: 0 | Status: CANCELLED | OUTPATIENT
Start: 2019-11-11

## 2019-11-11 NOTE — TELEPHONE ENCOUNTER
From: Adam Tom  To: Rehana Pike DO  Sent: 11/11/2019 2:18 PM CST  Subject: Referral Request    Hi Dr. Branden Ulloa       I called my insurance, and [facility] is in my network, and there is one in [facility] that I can go to.  Do you want to put the request in,

## 2019-11-14 NOTE — TELEPHONE ENCOUNTER
[facility] she asked that referral be refaxed, they did not receive second page that would show Dr Ilsa Mckeonre name and signature  [facility]#[phone]  Tasked to nursing

## 2019-11-20 NOTE — TELEPHONE ENCOUNTER
Current Outpatient Medications   Medication Sig Dispense Refill   • FARXIGA 10 MG Oral Tab TAKE 10 MG BY MOUTH DAILY.  30 tablet 0     Refill 90 day

## 2019-12-01 ENCOUNTER — PATIENT MESSAGE (OUTPATIENT)
Dept: INTERNAL MEDICINE CLINIC | Facility: CLINIC | Age: 34
End: 2019-12-01

## 2019-12-02 NOTE — TELEPHONE ENCOUNTER
From: Tyler Wu  To: Jarek Holloway DO  Sent: 12/1/2019 9:28 PM CST  Subject: Prescription Heather Hernandez,     It's been almost 2 months on the new antacid pill (2 times daily) and I was fine the first few weeks, and now it's getting really bad

## 2019-12-03 ENCOUNTER — TELEPHONE (OUTPATIENT)
Dept: INTERNAL MEDICINE CLINIC | Facility: CLINIC | Age: 34
End: 2019-12-03

## 2019-12-03 RX ORDER — FAMOTIDINE 20 MG/1
TABLET ORAL
Qty: 60 TABLET | Refills: 0 | Status: SHIPPED | OUTPATIENT
Start: 2019-12-03 | End: 2019-12-03 | Stop reason: CLARIF

## 2019-12-03 RX ORDER — NICOTINE POLACRILEX 4 MG/1
1 GUM, CHEWING ORAL DAILY
Qty: 90 TABLET | Refills: 3 | Status: SHIPPED | OUTPATIENT
Start: 2019-12-03 | End: 2020-11-15

## 2019-12-03 NOTE — TELEPHONE ENCOUNTER
To Dr. Robert Aguirre previous RX was for pepcid was for BID, per 12/1 [employer] message you gave instructions for nightly x2 weeks. #30 pended with new sig. Please sign off.

## 2019-12-04 RX ORDER — FAMOTIDINE 20 MG/1
20 TABLET ORAL NIGHTLY
Qty: 30 TABLET | Refills: 0 | Status: SHIPPED | OUTPATIENT
Start: 2019-12-04 | End: 2019-12-10

## 2019-12-04 RX ORDER — IBUPROFEN 800 MG/1
800 TABLET ORAL DAILY PRN
Qty: 30 TABLET | Refills: 2 | OUTPATIENT
Start: 2019-12-04 | End: 2020-11-28

## 2019-12-04 NOTE — TELEPHONE ENCOUNTER
Requested Prescriptions     Refused Prescriptions Disp Refills   • IBUPROFEN 800 MG Oral Tab [Pharmacy Med Name: IBUPROFEN 800 MG TABLET] 30 tablet 2     Sig: TAKE 1 TABLET (800 MG TOTAL) BY MOUTH DAILY AS NEEDED FOR PAIN.      Refused By: Allison Gracia,

## 2019-12-10 ENCOUNTER — TELEPHONE (OUTPATIENT)
Dept: INTERNAL MEDICINE CLINIC | Facility: CLINIC | Age: 34
End: 2019-12-10

## 2019-12-10 RX ORDER — FAMOTIDINE 20 MG/1
20 TABLET ORAL NIGHTLY
Qty: 90 TABLET | Refills: 1 | Status: SHIPPED | OUTPATIENT
Start: 2019-12-10 | End: 2021-11-17

## 2019-12-10 NOTE — TELEPHONE ENCOUNTER
PT progress note from [facility] signed by Dr. Anna Marie Hernandez and faxed back to [phone]. Fax confirmation received. Copy to scanning.

## 2019-12-23 ENCOUNTER — TELEPHONE (OUTPATIENT)
Dept: ENDOCRINOLOGY CLINIC | Facility: CLINIC | Age: 34
End: 2019-12-23

## 2019-12-23 NOTE — TELEPHONE ENCOUNTER
Current Outpatient Medications   Medication Sig Dispense Refill     Covermymeds PA request for Victoza 18mg/3ml pens Key [id number], not on med list pls clarify

## 2019-12-31 ENCOUNTER — TELEPHONE (OUTPATIENT)
Dept: INTERNAL MEDICINE CLINIC | Facility: CLINIC | Age: 34
End: 2019-12-31

## 2019-12-31 NOTE — TELEPHONE ENCOUNTER
Rx marked as d/c at [address] [place] [address] to pt for clarification. Pt was prescribed Naproxen instead.

## 2019-12-31 NOTE — TELEPHONE ENCOUNTER
To Dr. Heather De Santiago----    Rx pended for review. Mycharted patient as it appeared she was prescribed Naproxen instead (ibuprofen had been discontinued). Patient report she is no longer taking Naproxen and requesting refill for Ibuprofen.

## 2020-01-01 RX ORDER — IBUPROFEN 800 MG/1
800 TABLET ORAL DAILY PRN
Qty: 90 TABLET | Refills: 3 | Status: SHIPPED | OUTPATIENT
Start: 2020-01-01 | End: 2021-01-08

## 2020-01-25 DIAGNOSIS — E11.9 TYPE 2 DIABETES MELLITUS WITHOUT COMPLICATION, WITHOUT LONG-TERM CURRENT USE OF INSULIN (HCC): ICD-10-CM

## 2020-01-27 ENCOUNTER — TELEPHONE (OUTPATIENT)
Dept: ENDOCRINOLOGY CLINIC | Facility: CLINIC | Age: 35
End: 2020-01-27

## 2020-01-27 RX ORDER — GLIMEPIRIDE 4 MG/1
8 TABLET ORAL
Qty: 180 TABLET | Refills: 0 | Status: SHIPPED | OUTPATIENT
Start: 2020-01-27 | End: 2020-04-09

## 2020-01-27 NOTE — TELEPHONE ENCOUNTER
Current Outpatient Medications   Medication Sig Dispense Refill   • Dulaglutide 1.5 MG/0.5ML Subcutaneous Solution Pen-injector INJECT 1.5 MG INTO THE SKIN ONCE A WEEK.  2 mL 0     Per pharmacy needs to be 90 day per [employer]

## 2020-02-03 RX ORDER — BLOOD SUGAR DIAGNOSTIC
STRIP MISCELLANEOUS
Qty: 200 STRIP | Refills: 0 | Status: SHIPPED | OUTPATIENT
Start: 2020-02-03

## 2020-04-06 NOTE — TELEPHONE ENCOUNTER
Noted.      Future Appointments   Date Time Provider Cayla Camarillo   4/10/2020  1:45 PM Rosalva Madrigal MD [address] [place] [address]

## 2020-04-06 NOTE — TELEPHONE ENCOUNTER
Dr. Javier Jenkins,     Per patient her insurance will only pay for her medications 90 days at a time. Script pended for 90 days. She also prefers to do a telephone visit rather than video visit and she can only do it on Friday because of work schedule.   RN s

## 2020-04-06 NOTE — TELEPHONE ENCOUNTER
LOV: 11/08/19 (RTC 3 MONTHS)  LR: 01/28/20    No future appointments - Would you like this patient to be booked for first available or telehealth?   Last A1C was done by Dr. Hasmukh Shukla & Units 10/24/2019   HEMOGLOBIN A1c      <5.7

## 2020-04-09 DIAGNOSIS — E11.9 TYPE 2 DIABETES MELLITUS WITHOUT COMPLICATION, WITHOUT LONG-TERM CURRENT USE OF INSULIN (HCC): ICD-10-CM

## 2020-04-09 RX ORDER — GLIMEPIRIDE 4 MG/1
8 TABLET ORAL
Qty: 180 TABLET | Refills: 0 | Status: SHIPPED | OUTPATIENT
Start: 2020-04-09 | End: 2020-07-01

## 2020-04-10 ENCOUNTER — TELEPHONE (OUTPATIENT)
Dept: ENDOCRINOLOGY CLINIC | Facility: CLINIC | Age: 35
End: 2020-04-10

## 2020-04-10 ENCOUNTER — VIRTUAL PHONE E/M (OUTPATIENT)
Dept: ENDOCRINOLOGY CLINIC | Facility: CLINIC | Age: 35
End: 2020-04-10
Payer: COMMERCIAL

## 2020-04-10 DIAGNOSIS — R79.89 HIGH SERUM THYROID STIMULATING HORMONE (TSH): ICD-10-CM

## 2020-04-10 DIAGNOSIS — E11.65 TYPE 2 DIABETES MELLITUS WITH HYPERGLYCEMIA, UNSPECIFIED WHETHER LONG TERM INSULIN USE (HCC): Primary | ICD-10-CM

## 2020-04-10 DIAGNOSIS — E78.5 DYSLIPIDEMIA: ICD-10-CM

## 2020-04-10 PROCEDURE — 99213 OFFICE O/P EST LOW 20 MIN: CPT | Performed by: INTERNAL MEDICINE

## 2020-04-10 NOTE — TELEPHONE ENCOUNTER
Please mail lab orders that have been entered today   She goes to [employer].      July 17 at [facility] book  Patient aware  Thanks

## 2020-04-10 NOTE — PROGRESS NOTES
Virtual Telephone Check-In    Palomo Delacruz verbally consents to a Virtual/Telephone Check-In visit on 04/10/20. Patient understands and accepts financial responsibility for any deductible, co-insurance and/or co-pays associated with this service.     D implications of uncontrolled diabetes including Diabetic ketoacidosis and various micro vascular and macrovascular complications. a). Medications:    Since reported sugars are good, we will CPM  SE of all medications reviewed , she denies any.    Enc

## 2020-05-04 RX ORDER — DAPAGLIFLOZIN 10 MG/1
TABLET, FILM COATED ORAL
Qty: 90 TABLET | Refills: 0 | Status: SHIPPED | OUTPATIENT
Start: 2020-05-04 | End: 2020-07-28

## 2020-07-01 DIAGNOSIS — E11.9 TYPE 2 DIABETES MELLITUS WITHOUT COMPLICATION, WITHOUT LONG-TERM CURRENT USE OF INSULIN (HCC): ICD-10-CM

## 2020-07-01 RX ORDER — GLIMEPIRIDE 4 MG/1
TABLET ORAL
Qty: 180 TABLET | Refills: 0 | Status: SHIPPED | OUTPATIENT
Start: 2020-07-01 | End: 2020-09-23

## 2020-07-28 RX ORDER — DAPAGLIFLOZIN 10 MG/1
TABLET, FILM COATED ORAL
Qty: 90 TABLET | Refills: 0 | Status: SHIPPED | OUTPATIENT
Start: 2020-07-28 | End: 2020-10-18

## 2020-09-23 ENCOUNTER — TELEPHONE (OUTPATIENT)
Dept: ENDOCRINOLOGY CLINIC | Facility: CLINIC | Age: 35
End: 2020-09-23

## 2020-09-23 DIAGNOSIS — E11.9 TYPE 2 DIABETES MELLITUS WITHOUT COMPLICATION, WITHOUT LONG-TERM CURRENT USE OF INSULIN (HCC): ICD-10-CM

## 2020-09-23 RX ORDER — GLIMEPIRIDE 4 MG/1
8 TABLET ORAL
Qty: 60 TABLET | Refills: 0 | Status: SHIPPED | OUTPATIENT
Start: 2020-09-23 | End: 2020-10-16

## 2020-09-24 NOTE — TELEPHONE ENCOUNTER
Dr. Charline Collins you have an MD approval appt available on 10/5/20 at 1045 am. OK to offer this appt?

## 2020-09-25 RX ORDER — LISINOPRIL 2.5 MG/1
TABLET ORAL
Qty: 90 TABLET | Refills: 0 | Status: SHIPPED | OUTPATIENT
Start: 2020-09-25 | End: 2020-12-18

## 2020-10-13 RX ORDER — DULAGLUTIDE 1.5 MG/.5ML
INJECTION, SOLUTION SUBCUTANEOUS
Qty: 2 ML | Refills: 2 | Status: SHIPPED | OUTPATIENT
Start: 2020-10-13 | End: 2021-02-11

## 2020-10-16 DIAGNOSIS — E11.9 TYPE 2 DIABETES MELLITUS WITHOUT COMPLICATION, WITHOUT LONG-TERM CURRENT USE OF INSULIN (HCC): ICD-10-CM

## 2020-10-16 RX ORDER — GLIMEPIRIDE 4 MG/1
TABLET ORAL
Qty: 60 TABLET | Refills: 1 | Status: SHIPPED | OUTPATIENT
Start: 2020-10-16 | End: 2020-11-10

## 2020-10-18 RX ORDER — DAPAGLIFLOZIN 10 MG/1
TABLET, FILM COATED ORAL
Qty: 90 TABLET | Refills: 0 | Status: SHIPPED | OUTPATIENT
Start: 2020-10-18 | End: 2021-01-09

## 2020-11-10 ENCOUNTER — TELEPHONE (OUTPATIENT)
Dept: ENDOCRINOLOGY CLINIC | Facility: CLINIC | Age: 35
End: 2020-11-10

## 2020-11-10 DIAGNOSIS — E11.9 TYPE 2 DIABETES MELLITUS WITHOUT COMPLICATION, WITHOUT LONG-TERM CURRENT USE OF INSULIN (HCC): ICD-10-CM

## 2020-11-10 RX ORDER — GLIMEPIRIDE 4 MG/1
8 TABLET ORAL
Qty: 60 TABLET | Refills: 0 | Status: SHIPPED | OUTPATIENT
Start: 2020-11-10 | End: 2020-11-13

## 2020-11-13 RX ORDER — GLIMEPIRIDE 4 MG/1
8 TABLET ORAL
Qty: 180 TABLET | Refills: 0 | Status: SHIPPED | OUTPATIENT
Start: 2020-11-13 | End: 2021-02-07

## 2020-11-13 NOTE — TELEPHONE ENCOUNTER
[facility] pharmacy called in to follow up on this refill request. She states that it needs to be for 90 days per the patient's insurance.  Please follow up

## 2020-11-13 NOTE — TELEPHONE ENCOUNTER
LOV: 04/10/20  LR: 11/10/20 - For 10 days only    Future Appointments   Date Time Provider Cayla Camarillo   12/1/2020  6:15 PM María English MD [address] [place] [address] [facility]     RN re-sent the medication for 90 days supply for insurance coverage.

## 2020-11-15 RX ORDER — OMEPRAZOLE 20 MG/1
CAPSULE, DELAYED RELEASE ORAL
Qty: 90 CAPSULE | Refills: 3 | Status: SHIPPED | OUTPATIENT
Start: 2020-11-15 | End: 2021-10-18

## 2020-11-16 NOTE — TELEPHONE ENCOUNTER
To FD pls call pt to schedule an annual OV     Refill request is for a maintenance medication and has met the criteria specified in the Ambulatory Medication Refill Standing Order for eligibility, visits, laboratory, alerts and was sent to the requested ph

## 2020-11-18 NOTE — TELEPHONE ENCOUNTER
Patient doesn't feel comfortable seeing an associate she sees Dr Rubio Moreno on 12/1 she will ask her to refill the medication

## 2020-12-01 ENCOUNTER — TELEPHONE (OUTPATIENT)
Dept: ENDOCRINOLOGY CLINIC | Facility: CLINIC | Age: 35
End: 2020-12-01

## 2020-12-01 ENCOUNTER — TELEMEDICINE (OUTPATIENT)
Dept: ENDOCRINOLOGY CLINIC | Facility: CLINIC | Age: 35
End: 2020-12-01
Payer: COMMERCIAL

## 2020-12-01 DIAGNOSIS — E78.5 DYSLIPIDEMIA: Primary | ICD-10-CM

## 2020-12-01 DIAGNOSIS — E11.65 TYPE 2 DIABETES MELLITUS WITH HYPERGLYCEMIA, UNSPECIFIED WHETHER LONG TERM INSULIN USE (HCC): ICD-10-CM

## 2020-12-01 PROCEDURE — 99213 OFFICE O/P EST LOW 20 MIN: CPT | Performed by: INTERNAL MEDICINE

## 2020-12-02 NOTE — PROGRESS NOTES
Telehealth outside of [address] Verbal Consent   I conducted a telehealth visit with Francine Gaffney today, 12/01/20, which was completed using two-way, real-time interactive audio and video communication.  This has been done in [place] to slava 2020  History of Neuropathy: No   History of Nephropathy: No     ASSOCIATED COMPLICATIONS:    HTN: Yes  Hyperlipidemia: Yes  Coronary Artery Disease:  No   Cerebrovascular Disease: No       HOME GLUCOSE READINGS:   Has not been checking sugars on a regular daily. 400 each 0   • Blood Glucose Monitoring Suppl (Osman Singh) w/Device Does not apply Kit 1 each by Does not apply route daily. 1 kit 0   • Glucose Blood (ONETOUCH VERIO) In Vitro Strip Use to check blood sugar 4 times daily.  400 strip 0   • BD PEN full sentences, non-labored.  no increased work of breathing, no audible wheezing    Skin:  normal moisture and skin texture, no visible lesions  Hair and nails: normal scalp hair  Hematologic:  no excessive bruising  Neuro: motor grossly intact, moving all this encounter. Please note that the following visit was completed using two-way, real-time interactive audio and video communication.   This has been done in [place] to provide continuity of care in the best interest of the provider-patient relatio

## 2020-12-18 RX ORDER — LISINOPRIL 2.5 MG/1
TABLET ORAL
Qty: 90 TABLET | Refills: 0 | Status: SHIPPED | OUTPATIENT
Start: 2020-12-18 | End: 2021-03-05

## 2020-12-18 NOTE — TELEPHONE ENCOUNTER
Upcoming appt with Dr. Walter Beverage 2/8/21    Refill request is for a maintenance medication and has met the criteria specified in the Ambulatory Medication Refill Standing Order for eligibility, visits, laboratory, alerts and was sent to the requested pharmacy.

## 2021-01-07 ENCOUNTER — TELEPHONE (OUTPATIENT)
Dept: INTERNAL MEDICINE CLINIC | Facility: CLINIC | Age: 36
End: 2021-01-07

## 2021-01-08 RX ORDER — IBUPROFEN 800 MG/1
800 TABLET ORAL DAILY PRN
Qty: 30 TABLET | Refills: 0 | Status: SHIPPED | OUTPATIENT
Start: 2021-01-08 | End: 2021-11-17

## 2021-01-08 NOTE — TELEPHONE ENCOUNTER
Reviewed and Rx done  Requested Prescriptions     Signed Prescriptions Disp Refills   • ibuprofen 800 MG Oral Tab 30 tablet 0     Sig: Take 1 tablet (800 mg total) by mouth daily as needed for Pain.      Authorizing Provider: Jus Knight     Ordering User:

## 2021-01-09 RX ORDER — DAPAGLIFLOZIN 10 MG/1
TABLET, FILM COATED ORAL
Qty: 90 TABLET | Refills: 0 | Status: SHIPPED | OUTPATIENT
Start: 2021-01-09 | End: 2021-05-03

## 2021-02-03 ENCOUNTER — PATIENT MESSAGE (OUTPATIENT)
Dept: INTERNAL MEDICINE CLINIC | Facility: CLINIC | Age: 36
End: 2021-02-03

## 2021-02-03 NOTE — TELEPHONE ENCOUNTER
Last eRX was sent 1/821 #30. Does patient in fact need refill?   She is also overdue for physical. Last modesto 11/2019    Left message to call back

## 2021-02-04 RX ORDER — IBUPROFEN 800 MG/1
800 TABLET ORAL DAILY PRN
Qty: 30 TABLET | Refills: 0 | OUTPATIENT
Start: 2021-02-04

## 2021-02-04 NOTE — TELEPHONE ENCOUNTER
From: Claudia Sepulveda  To: Robina Ayala DO  Sent: 2/3/2021 1:04 PM CST  Subject: Other    Toby Denton,     I tried calling you back but couldn't wait. Can you call me back after 200pm today about my ibuprofenprescription?     Thanks,   [employer]  [phone]

## 2021-02-04 NOTE — TELEPHONE ENCOUNTER
Per 11/7/19 ov note: 6. Back pain  Discussed stopping naproxen and considering PT; also discussed weight loss. She will look into where this will be covered and let me know. damaso sent to patient to inquire if she's still taking for back pain. Advised to call and schedule annual visit.

## 2021-02-07 DIAGNOSIS — E11.9 TYPE 2 DIABETES MELLITUS WITHOUT COMPLICATION, WITHOUT LONG-TERM CURRENT USE OF INSULIN (HCC): ICD-10-CM

## 2021-02-07 RX ORDER — GLIMEPIRIDE 4 MG/1
TABLET ORAL
Qty: 180 TABLET | Refills: 0 | Status: SHIPPED | OUTPATIENT
Start: 2021-02-07 | End: 2021-05-03

## 2021-02-10 ENCOUNTER — TELEPHONE (OUTPATIENT)
Dept: ENDOCRINOLOGY CLINIC | Facility: CLINIC | Age: 36
End: 2021-02-10

## 2021-02-10 DIAGNOSIS — E11.9 TYPE 2 DIABETES MELLITUS WITHOUT COMPLICATION, WITHOUT LONG-TERM CURRENT USE OF INSULIN (HCC): Primary | ICD-10-CM

## 2021-02-11 RX ORDER — DULAGLUTIDE 1.5 MG/.5ML
1.5 INJECTION, SOLUTION SUBCUTANEOUS WEEKLY
Qty: 2 ML | Refills: 2 | Status: SHIPPED | OUTPATIENT
Start: 2021-02-11 | End: 2021-02-16 | Stop reason: DRUGHIGH

## 2021-02-11 NOTE — TELEPHONE ENCOUNTER
•  TRULICITY 1.5 NC/4.3TN Subcutaneous Solution Pen-injector, INJECT 1.5 MG INTO THE SKIN ONCE A WEEK., Disp: 2 mL, Rfl: 2

## 2021-02-12 PROCEDURE — 3052F HG A1C>EQUAL 8.0%<EQUAL 9.0%: CPT | Performed by: INTERNAL MEDICINE

## 2021-02-13 LAB
ALBUMIN/GLOBULIN RATIO: 1.4 (CALC) (ref 1–2.5)
ALBUMIN: 3.8 G/DL (ref 3.6–5.1)
ALKALINE PHOSPHATASE: 93 U/L (ref 31–125)
ALT: 16 U/L (ref 6–29)
AST: 13 U/L (ref 10–30)
BILIRUBIN, TOTAL: 0.4 MG/DL (ref 0.2–1.2)
BUN: 12 MG/DL (ref 7–25)
CALCIUM: 8.9 MG/DL (ref 8.6–10.2)
CARBON DIOXIDE: 23 MMOL/L (ref 20–32)
CHLORIDE: 103 MMOL/L (ref 98–110)
CHOL/HDLC RATIO: 4.3 (CALC)
CHOLESTEROL, TOTAL: 212 MG/DL
CREATININE, RANDOM URINE: 64 MG/DL (ref 20–275)
CREATININE: 0.66 MG/DL (ref 0.5–1.1)
EGFR IF AFRICN AM: 132 ML/MIN/1.73M2
EGFR IF NONAFRICN AM: 114 ML/MIN/1.73M2
GLOBULIN: 2.7 G/DL (CALC) (ref 1.9–3.7)
GLUCOSE: 154 MG/DL (ref 65–99)
HDL CHOLESTEROL: 49 MG/DL
HEMOGLOBIN A1C: 9 % OF TOTAL HGB
LDL-CHOLESTEROL: 129 MG/DL (CALC)
MICROALBUMIN/CREATININE RATIO, RANDOM URINE: 8 MCG/MG CREAT
MICROALBUMIN: 0.5 MG/DL
NON-HDL CHOLESTEROL: 163 MG/DL (CALC)
POTASSIUM: 4.3 MMOL/L (ref 3.5–5.3)
PROTEIN, TOTAL: 6.5 G/DL (ref 6.1–8.1)
SODIUM: 137 MMOL/L (ref 135–146)
TRIGLYCERIDES: 202 MG/DL
TSH W/REFLEX TO FT4: 4.29 MIU/L

## 2021-02-14 ENCOUNTER — TELEPHONE (OUTPATIENT)
Dept: ENDOCRINOLOGY CLINIC | Facility: CLINIC | Age: 36
End: 2021-02-14

## 2021-02-14 NOTE — TELEPHONE ENCOUNTER
Kidney function, urine MA normal  TSH normal  LDL is high : low fat diet, has she been taking atorvastatin 40 mg daily, if not please start  a1c is high at 9 %   Please increase trulicity from 1.5 to 3 mg q week  Please book first available FU with me  alvaro

## 2021-02-16 ENCOUNTER — PATIENT MESSAGE (OUTPATIENT)
Dept: ENDOCRINOLOGY CLINIC | Facility: CLINIC | Age: 36
End: 2021-02-16

## 2021-02-16 RX ORDER — DULAGLUTIDE 3 MG/.5ML
3 INJECTION, SOLUTION SUBCUTANEOUS WEEKLY
Qty: 6 ML | Refills: 0 | Status: SHIPPED | OUTPATIENT
Start: 2021-02-16 | End: 2021-07-29

## 2021-02-16 RX ORDER — ATORVASTATIN CALCIUM 40 MG/1
40 TABLET, FILM COATED ORAL NIGHTLY
Qty: 90 TABLET | Refills: 3 | Status: SHIPPED | OUTPATIENT
Start: 2021-02-16 | End: 2022-01-12

## 2021-02-16 NOTE — TELEPHONE ENCOUNTER
RN spoke with patient and discussed note below from Dr Gayla Irby at length. Pt booked apt April 6th. Pt was not taking atorvastatin 40 mg and will restart. Med ordered per AM written. Pt verbalized understanding to increase Trulicity to 3 mg weekly.

## 2021-02-16 NOTE — TELEPHONE ENCOUNTER
From: Erasmo Richter  To: Ward Davenport MD  Sent: 2/16/2021 12:23 PM CST  Subject: Other    Can someone please call me after 330 today?

## 2021-02-16 NOTE — TELEPHONE ENCOUNTER
Patient is returning the call. RN not available. Patient goes to lunch from 12 to 12:30 just FYI.  Please call

## 2021-02-18 NOTE — H&P
Nadiraustin Wilhelm is a 38year old female. HPI:   Patient presents with:  Physical    Ms. Faiza Moulton is a 38year old female coming in for annual physical examination    Thumb pain: L side ongoing for 1 month. Ibuprofen x2 a day, pain is not as bad.  WOrks from or unspecified type diabetes mellitus without mention of complication, not stated as uncontrolled 2012   • Wears glasses       Past Surgical History:   Procedure Laterality Date   • ARTHROSCOPY ANKLE WITH DEBRIDEMENT Right 9/16/2014    Performed by Bennett Teixeira BEFORE BREAKFAST 180 tablet 0   • METFORMIN HCL 1000 MG Oral Tab TAKE 1 TABLET BY MOUTH TWICE A  tablet 0   • FARXIGA 10 MG Oral Tab TAKE 1 TABLET BY MOUTH EVERY DAY 90 tablet 0   • ibuprofen 800 MG Oral Tab Take 1 tablet (800 mg total) by mouth luis Rhinorrhea, Swallowing Difficulty  Eyes: Eye Pain, Swelling, Redness, Foreign Body, Discharge, Vision Changes  Cardiovascular: Chest Pain, SOB, PND, Dyspnea on Exertion, Orthopnea, Claudication, Edema, Palpitations  Respiratory: Cough, Sputum, Wheezing, Sh II-XII intact, light touch intact, MSK Strength 5/5 and symmetric in all extremities, normal gait  Musculoskeletal: Full range of motion of all extremities, no clubbing/swelling/edema  + Left thumb tenderness to palpation along the MCP joint, full active r 12/1/2020. On that visit, Trulicity was increased to 3 mg every week as recommended.     Recent A1c:   HEMOGLOBIN A1C (%)   Date Value   02/15/2019 7.9 (A)     HEMOGLOBIN A1c (% of total Hgb)   Date Value   02/12/2021 9.0 (H)     Recent urine microalbumin: - hold off  Td/Tdap: Last Tdap 2016, due 2026  Zoster (60+):  Not indicated  HPV (19-26): Not indicated  Pneumococcal: Received 2018    Immunization History   Administered Date(s) Administered   • DTP 06/07/1984, 08/16/1984, 10/05/1984, 09/03/1985, 09/15/19

## 2021-02-22 PROBLEM — I10 ESSENTIAL HYPERTENSION: Status: ACTIVE | Noted: 2021-02-22

## 2021-02-22 NOTE — PATIENT INSTRUCTIONS
- You were seen in clinic for regular annual check-up. We reviewed the blood work from 2/12/2021.  -We recommended continuing to check her blood sugar as able. The A1c is going down, let us try to get it down further.   Continue take the medications as re

## 2021-02-25 ENCOUNTER — OFFICE VISIT (OUTPATIENT)
Dept: INTERNAL MEDICINE CLINIC | Facility: CLINIC | Age: 36
End: 2021-02-25
Payer: COMMERCIAL

## 2021-02-25 VITALS
HEIGHT: 64 IN | DIASTOLIC BLOOD PRESSURE: 66 MMHG | HEART RATE: 72 BPM | WEIGHT: 293 LBS | SYSTOLIC BLOOD PRESSURE: 122 MMHG | TEMPERATURE: 99 F | BODY MASS INDEX: 50.02 KG/M2

## 2021-02-25 DIAGNOSIS — Z00.00 ANNUAL PHYSICAL EXAM: ICD-10-CM

## 2021-02-25 DIAGNOSIS — I10 ESSENTIAL HYPERTENSION: ICD-10-CM

## 2021-02-25 DIAGNOSIS — E78.5 DYSLIPIDEMIA: ICD-10-CM

## 2021-02-25 DIAGNOSIS — E11.9 TYPE 2 DIABETES MELLITUS WITHOUT COMPLICATION, WITHOUT LONG-TERM CURRENT USE OF INSULIN (HCC): Primary | ICD-10-CM

## 2021-02-25 DIAGNOSIS — E11.9 TYPE 2 DIABETES MELLITUS WITHOUT COMPLICATION, UNSPECIFIED WHETHER LONG TERM INSULIN USE (HCC): ICD-10-CM

## 2021-02-25 DIAGNOSIS — M79.645 PAIN OF LEFT THUMB: ICD-10-CM

## 2021-02-25 DIAGNOSIS — J01.90 ACUTE SINUSITIS, RECURRENCE NOT SPECIFIED, UNSPECIFIED LOCATION: ICD-10-CM

## 2021-02-25 PROCEDURE — 3008F BODY MASS INDEX DOCD: CPT | Performed by: INTERNAL MEDICINE

## 2021-02-25 PROCEDURE — 99395 PREV VISIT EST AGE 18-39: CPT | Performed by: INTERNAL MEDICINE

## 2021-02-25 PROCEDURE — 3074F SYST BP LT 130 MM HG: CPT | Performed by: INTERNAL MEDICINE

## 2021-02-25 PROCEDURE — 3078F DIAST BP <80 MM HG: CPT | Performed by: INTERNAL MEDICINE

## 2021-02-25 RX ORDER — FLUTICASONE PROPIONATE 50 MCG
2 SPRAY, SUSPENSION (ML) NASAL DAILY
Qty: 3 BOTTLE | Refills: 3 | Status: SHIPPED | OUTPATIENT
Start: 2021-02-25 | End: 2022-02-20

## 2021-03-05 ENCOUNTER — HOSPITAL ENCOUNTER (OUTPATIENT)
Dept: GENERAL RADIOLOGY | Age: 36
Discharge: HOME | End: 2021-03-05
Attending: INTERNAL MEDICINE
Payer: COMMERCIAL

## 2021-03-05 ENCOUNTER — TELEPHONE (OUTPATIENT)
Dept: INTERNAL MEDICINE CLINIC | Facility: CLINIC | Age: 36
End: 2021-03-05

## 2021-03-05 DIAGNOSIS — M79.645 PAIN OF LEFT THUMB: ICD-10-CM

## 2021-03-05 PROCEDURE — 73130 X-RAY EXAM OF HAND: CPT | Performed by: INTERNAL MEDICINE

## 2021-03-05 RX ORDER — LISINOPRIL 2.5 MG/1
TABLET ORAL
Qty: 90 TABLET | Refills: 3 | Status: SHIPPED | OUTPATIENT
Start: 2021-03-05

## 2021-03-05 NOTE — TELEPHONE ENCOUNTER
Please notify the patient that her hand x-ray from today acute abnormalities or arthritis changes. There is no active swelling as well. There is some old changes from possible previous injury in the past, however nothing that is new.   As discussed in her

## 2021-03-28 ENCOUNTER — IMMUNIZATION (OUTPATIENT)
Dept: LAB | Facility: HOSPITAL | Age: 36
End: 2021-03-28
Attending: HOSPITALIST
Payer: COMMERCIAL

## 2021-03-28 DIAGNOSIS — Z23 NEED FOR VACCINATION: Primary | ICD-10-CM

## 2021-03-28 PROCEDURE — 0011A SARSCOV2 VAC 100MCG/0.5ML IM: CPT

## 2021-04-02 RX ORDER — DAPAGLIFLOZIN 10 MG/1
TABLET, FILM COATED ORAL
Qty: 90 TABLET | Refills: 0 | OUTPATIENT
Start: 2021-04-02

## 2021-04-02 NOTE — TELEPHONE ENCOUNTER
Refill request has failed the Ambulatory Medication Refill Standing Order and is routed to the primary physician to review the following:    Requested Prescriptions     Refused Prescriptions Disp Refills   • [address] [facility] 10 MG Oral Tab [Pharmacy Med Name: Allison Ding

## 2021-04-13 ENCOUNTER — OFFICE VISIT (OUTPATIENT)
Dept: ENDOCRINOLOGY CLINIC | Facility: CLINIC | Age: 36
End: 2021-04-13
Payer: COMMERCIAL

## 2021-04-13 VITALS
DIASTOLIC BLOOD PRESSURE: 93 MMHG | SYSTOLIC BLOOD PRESSURE: 143 MMHG | BODY MASS INDEX: 54 KG/M2 | WEIGHT: 293 LBS | HEART RATE: 98 BPM

## 2021-04-13 DIAGNOSIS — E11.9 TYPE 2 DIABETES MELLITUS WITHOUT COMPLICATION, WITHOUT LONG-TERM CURRENT USE OF INSULIN (HCC): Primary | ICD-10-CM

## 2021-04-13 DIAGNOSIS — E78.5 DYSLIPIDEMIA: ICD-10-CM

## 2021-04-13 PROCEDURE — 3077F SYST BP >= 140 MM HG: CPT | Performed by: INTERNAL MEDICINE

## 2021-04-13 PROCEDURE — 3080F DIAST BP >= 90 MM HG: CPT | Performed by: INTERNAL MEDICINE

## 2021-04-13 PROCEDURE — 82947 ASSAY GLUCOSE BLOOD QUANT: CPT | Performed by: INTERNAL MEDICINE

## 2021-04-13 PROCEDURE — 36416 COLLJ CAPILLARY BLOOD SPEC: CPT | Performed by: INTERNAL MEDICINE

## 2021-04-13 PROCEDURE — 99214 OFFICE O/P EST MOD 30 MIN: CPT | Performed by: INTERNAL MEDICINE

## 2021-04-13 NOTE — PROGRESS NOTES
Return Office Visit - Diabetes    CHIEF COMPLAINT:    DM   Dyslipidemia      HISTORY OF PRESENT ILLNESS:   James Thakur is a 39year old female who presents for follow up for diabetes.         DM HISTORY  Diagnosed: at age 26    FH of DM: Parents have Vitro Strip CHECK BLOOD SUGAR TWICE A  strip 0   • famoTIDine 20 MG Oral Tab Take 1 tablet (20 mg total) by mouth nightly. 90 tablet 1   • ONETOUCH DELICA LANCETS FINE Does not apply Misc 1 each by Finger stick route 2 (two) times daily.  200 each 0 polyuria, polydypsia  Skin: Negative for: rash, blister, cellulitis,      PHYSICAL EXAM:     /93, OH 98, Weight 314 pounds    General Appearance:  alert, well developed, in no acute distress  Nutritional:  no extreme weight gain or loss  Head: Atraum in great detail, to get log and glucometer on next visit. f). Life style changes discussed  g). Hypoglycemia recognition and management discussed    2.  Dyslipidemia  LDL is high  A) Discussed lifestyle modifications including reductions in dietary total a

## 2021-04-14 ENCOUNTER — PATIENT MESSAGE (OUTPATIENT)
Dept: INTERNAL MEDICINE CLINIC | Facility: CLINIC | Age: 36
End: 2021-04-14

## 2021-04-14 NOTE — TELEPHONE ENCOUNTER
From: Satnam Sousa  To: Hernandez Ramires MD  Sent: 4/14/2021 8:04 AM CDT  Subject: Non-Urgent Ramu Dai,    I have recently developed a \"rash\" like over my lip.  I thought it was from a new face wash I was using, so I stopped usi

## 2021-04-14 NOTE — TELEPHONE ENCOUNTER
Reviewed imaging and respond to MyChart. Prescribed hydrocortisone 6.7% 1 application twice a day until it clears up.

## 2021-04-22 ENCOUNTER — NURSE ONLY (OUTPATIENT)
Dept: ENDOCRINOLOGY CLINIC | Facility: CLINIC | Age: 36
End: 2021-04-22
Payer: COMMERCIAL

## 2021-04-22 DIAGNOSIS — E11.65 UNCONTROLLED TYPE 2 DIABETES MELLITUS WITH HYPERGLYCEMIA (HCC): Primary | ICD-10-CM

## 2021-04-22 PROCEDURE — 99211 OFF/OP EST MAY X REQ PHY/QHP: CPT | Performed by: INTERNAL MEDICINE

## 2021-04-22 NOTE — PROGRESS NOTES
Morelia Barnes  : 3/30/1985 was seen for Diabetic Education Follow up: Review of diet/exercise and glucose logs. Visit completed via telephone.      Date: 2021    Referring Provider: Dr. Luke Samuel   Start time: 5:00 pm  End time: 5:23 pm      As 3-4 days per week. Currently testing BG: not as often as should- couple times a month     Last Friday- 305 after lunch and this was the last time she checked. She is aware she is supposed to check glucose every day.     Education:     Diabetes Genie Burgess Risk  Overview of complications reviewed including eye, dental, CV health, renal protection, and foot care discussed. Health Coping  Family involvement/support encouraged  Depression and diabetes reviewed. Recommendations:      1.  Follow recommended

## 2021-04-25 NOTE — TELEPHONE ENCOUNTER
Refill request has failed the Ambulatory Medication Refill Standing Order and is routed to the primary physician to review the following:    Requested Prescriptions     Refused Prescriptions Disp Refills   • METFORMIN HCL 1000 MG Oral Tab Elissa Med Nam

## 2021-04-27 ENCOUNTER — IMMUNIZATION (OUTPATIENT)
Dept: LAB | Facility: HOSPITAL | Age: 36
End: 2021-04-27
Attending: EMERGENCY MEDICINE
Payer: COMMERCIAL

## 2021-04-27 DIAGNOSIS — Z23 NEED FOR VACCINATION: Primary | ICD-10-CM

## 2021-04-27 PROCEDURE — 0012A SARSCOV2 VAC 100MCG/0.5ML IM: CPT

## 2021-05-02 DIAGNOSIS — E11.9 TYPE 2 DIABETES MELLITUS WITHOUT COMPLICATION, WITHOUT LONG-TERM CURRENT USE OF INSULIN (HCC): ICD-10-CM

## 2021-05-02 RX ORDER — DULAGLUTIDE 1.5 MG/.5ML
1.5 INJECTION, SOLUTION SUBCUTANEOUS WEEKLY
Refills: 0 | OUTPATIENT
Start: 2021-05-02

## 2021-05-03 RX ORDER — GLIMEPIRIDE 4 MG/1
TABLET ORAL
Qty: 180 TABLET | Refills: 0 | Status: SHIPPED | OUTPATIENT
Start: 2021-05-03 | End: 2021-07-25

## 2021-05-03 RX ORDER — DAPAGLIFLOZIN 10 MG/1
TABLET, FILM COATED ORAL
Qty: 90 TABLET | Refills: 0 | Status: SHIPPED | OUTPATIENT
Start: 2021-05-03 | End: 2021-07-29

## 2021-05-03 NOTE — TELEPHONE ENCOUNTER
LOV: 04/13/21  Future Appointments   Date Time Provider Cayla Camarillo   6/22/2021  5:15 PM Joel Cotter MD [address] [place] [address]     Routed to provider for refill approval.

## 2021-05-03 NOTE — TELEPHONE ENCOUNTER
Pt called in to follow up on the refill request. She states she is running low on the medications.  She also states she has can talk during lunch between 12pm to 12:30pm, or after 3:30pm If possible  Please follow up

## 2021-07-21 ENCOUNTER — TELEPHONE (OUTPATIENT)
Dept: ENDOCRINOLOGY CLINIC | Facility: CLINIC | Age: 36
End: 2021-07-21

## 2021-07-21 NOTE — TELEPHONE ENCOUNTER
Dr. Danny Franks,  Patient states she cannot afford to come in to office for f/u apt (her friends in healthcare told her that doctors' office are charging extra for all the PPE they wear - RN advised that is not the case)  Patient asking if f/u can be VV - sh

## 2021-07-21 NOTE — TELEPHONE ENCOUNTER
We can do a VV  Please schedule   She can please go for labs fasting 4-5 prior to apt  Please route to me once apt is made, I can order labs  Thanks

## 2021-07-22 NOTE — TELEPHONE ENCOUNTER
LMTCB.    [facility] sent as well    Please advise if ok to overbook VV Monday 8/2 at end of schedule or if there are other time slots you can approve of.

## 2021-07-25 DIAGNOSIS — E11.9 TYPE 2 DIABETES MELLITUS WITHOUT COMPLICATION, WITHOUT LONG-TERM CURRENT USE OF INSULIN (HCC): ICD-10-CM

## 2021-07-25 RX ORDER — GLIMEPIRIDE 4 MG/1
TABLET ORAL
Qty: 180 TABLET | Refills: 0 | Status: SHIPPED | OUTPATIENT
Start: 2021-07-25 | End: 2021-10-19

## 2021-07-29 ENCOUNTER — TELEPHONE (OUTPATIENT)
Dept: ENDOCRINOLOGY CLINIC | Facility: CLINIC | Age: 36
End: 2021-07-29

## 2021-07-29 RX ORDER — DAPAGLIFLOZIN 10 MG/1
TABLET, FILM COATED ORAL
Qty: 90 TABLET | Refills: 0 | Status: SHIPPED | OUTPATIENT
Start: 2021-07-29 | End: 2021-11-08

## 2021-07-29 RX ORDER — DULAGLUTIDE 3 MG/.5ML
3 INJECTION, SOLUTION SUBCUTANEOUS WEEKLY
Qty: 6 ML | Refills: 0 | Status: SHIPPED | OUTPATIENT
Start: 2021-07-29 | End: 2021-10-19

## 2021-08-02 NOTE — TELEPHONE ENCOUNTER
LMTCB.    If patient returns the call, please assist in scheduling appointment per provider instruction.

## 2021-09-02 ENCOUNTER — PATIENT MESSAGE (OUTPATIENT)
Dept: INTERNAL MEDICINE CLINIC | Facility: CLINIC | Age: 36
End: 2021-09-02

## 2021-09-02 NOTE — TELEPHONE ENCOUNTER
To Dr. Charlie Rushing---    Please see below. OV 2/25/21. RN unsure if you utilize forms department?

## 2021-09-02 NOTE — TELEPHONE ENCOUNTER
From: Brinda Galeana  To: Tam Sesay MD  Sent: 9/2/2021 9:21 AM CDT  Subject: Other    Hi Dr. Silvia Freeman,    I met with you earlier this year for my annual physical. And now my job needs to have the paperwork filled out for my insurance.  I need to know if i

## 2021-09-15 NOTE — TELEPHONE ENCOUNTER
To the pool, lets keep an eye out for her health screening form. I am more than happy to complete it rather than send it to the forms department.

## 2021-09-17 NOTE — TELEPHONE ENCOUNTER
Form completed, to be faxed. Please also send Wangdaizhijiahart message to patient when fax confirmation comes in.

## 2021-10-15 DIAGNOSIS — E11.9 TYPE 2 DIABETES MELLITUS WITHOUT COMPLICATION, WITHOUT LONG-TERM CURRENT USE OF INSULIN (HCC): ICD-10-CM

## 2021-10-18 ENCOUNTER — PATIENT MESSAGE (OUTPATIENT)
Dept: INTERNAL MEDICINE CLINIC | Facility: CLINIC | Age: 36
End: 2021-10-18

## 2021-10-18 RX ORDER — OMEPRAZOLE 20 MG/1
CAPSULE, DELAYED RELEASE ORAL
Qty: 90 CAPSULE | Refills: 0 | Status: SHIPPED | OUTPATIENT
Start: 2021-10-18 | End: 2022-01-12

## 2021-10-18 NOTE — TELEPHONE ENCOUNTER
From: Julia Ahmadi  Sent: 10/18/2021 10:11 AM CDT  To: Em  [facility] Clinical Staff  Subject: Appt Reminder    Rachel,     Is Dr. Herbert Juárez back in the office?

## 2021-10-18 NOTE — TELEPHONE ENCOUNTER
From: Misbah Wilhelm  Sent: 10/18/2021 10:42 AM CDT  To: Em [facility] Clinical Staff  Subject: Appt Reminder    And does she have afternoon appointments ( like after 4)?

## 2021-10-19 RX ORDER — GLIMEPIRIDE 4 MG/1
TABLET ORAL
Qty: 60 TABLET | Refills: 0 | Status: SHIPPED | OUTPATIENT
Start: 2021-10-19 | End: 2021-11-12

## 2021-10-19 RX ORDER — DULAGLUTIDE 3 MG/.5ML
3 INJECTION, SOLUTION SUBCUTANEOUS WEEKLY
Qty: 2 ML | Refills: 0 | Status: SHIPPED | OUTPATIENT
Start: 2021-10-19 | End: 2021-11-14

## 2021-10-19 NOTE — TELEPHONE ENCOUNTER
LOV 4/13/21, RTC 2 months, no appt scheduled. Left message to call back and sent English Helpert to have patient schedule appt. 1 month supplies pended.

## 2021-10-26 ENCOUNTER — PATIENT MESSAGE (OUTPATIENT)
Dept: ENDOCRINOLOGY CLINIC | Facility: CLINIC | Age: 36
End: 2021-10-26

## 2021-10-26 DIAGNOSIS — E11.9 TYPE 2 DIABETES MELLITUS WITHOUT COMPLICATION, WITHOUT LONG-TERM CURRENT USE OF INSULIN (HCC): Primary | ICD-10-CM

## 2021-10-26 NOTE — TELEPHONE ENCOUNTER
From: Ajay Wallace  To: Caro Berman MD  Sent: 10/26/2021 12:58 PM CDT  Subject: Schedule an appointment     Hello,     I'm trying to schedule an appointment to see Dr. Charline Collins, but there is nothing until the end of the year, beginning of next year

## 2021-10-27 NOTE — TELEPHONE ENCOUNTER
Dr. Vani Simpson    Patient booked 11/17 at 10 am. Please advise if patient needs labs.  Last labs done 4/13/21

## 2021-10-29 PROCEDURE — 3061F NEG MICROALBUMINURIA REV: CPT | Performed by: INTERNAL MEDICINE

## 2021-10-30 ENCOUNTER — PATIENT MESSAGE (OUTPATIENT)
Dept: ENDOCRINOLOGY CLINIC | Facility: CLINIC | Age: 36
End: 2021-10-30

## 2021-10-31 ENCOUNTER — PATIENT MESSAGE (OUTPATIENT)
Dept: ENDOCRINOLOGY CLINIC | Facility: CLINIC | Age: 36
End: 2021-10-31

## 2021-10-31 NOTE — TELEPHONE ENCOUNTER
From: Kevin العراقي  To: Randi Mcdaniels MD  Sent: 10/30/2021 1:49 PM CDT  Subject: Question regarding COMP METABOLIC PANEL (14)    Did my A1C not get done or our the results not in yet?

## 2021-11-08 RX ORDER — DAPAGLIFLOZIN 10 MG/1
TABLET, FILM COATED ORAL
Qty: 90 TABLET | Refills: 0 | Status: SHIPPED | OUTPATIENT
Start: 2021-11-08 | End: 2022-01-24

## 2021-11-12 DIAGNOSIS — E11.9 TYPE 2 DIABETES MELLITUS WITHOUT COMPLICATION, WITHOUT LONG-TERM CURRENT USE OF INSULIN (HCC): ICD-10-CM

## 2021-11-12 RX ORDER — GLIMEPIRIDE 4 MG/1
TABLET ORAL
Qty: 60 TABLET | Refills: 0 | Status: SHIPPED | OUTPATIENT
Start: 2021-11-12 | End: 2021-12-08

## 2021-11-12 NOTE — TELEPHONE ENCOUNTER
LOV: 4/13/21    Future Appointments   Date Time Provider Cayla Camarillo   11/17/2021 10:00 AM Francine Peng MD [address] [place] [address]

## 2021-11-14 RX ORDER — DULAGLUTIDE 3 MG/.5ML
3 INJECTION, SOLUTION SUBCUTANEOUS WEEKLY
Qty: 2 ML | Refills: 0 | Status: SHIPPED | OUTPATIENT
Start: 2021-11-14 | End: 2021-11-17

## 2021-11-17 ENCOUNTER — OFFICE VISIT (OUTPATIENT)
Dept: ENDOCRINOLOGY CLINIC | Facility: CLINIC | Age: 36
End: 2021-11-17
Payer: COMMERCIAL

## 2021-11-17 ENCOUNTER — OFFICE VISIT (OUTPATIENT)
Dept: INTERNAL MEDICINE CLINIC | Facility: CLINIC | Age: 36
End: 2021-11-17
Payer: COMMERCIAL

## 2021-11-17 VITALS
BODY MASS INDEX: 50.02 KG/M2 | TEMPERATURE: 97 F | HEART RATE: 80 BPM | DIASTOLIC BLOOD PRESSURE: 80 MMHG | WEIGHT: 293 LBS | OXYGEN SATURATION: 97 % | SYSTOLIC BLOOD PRESSURE: 124 MMHG | HEIGHT: 64 IN

## 2021-11-17 VITALS
SYSTOLIC BLOOD PRESSURE: 121 MMHG | WEIGHT: 293 LBS | BODY MASS INDEX: 51 KG/M2 | HEART RATE: 86 BPM | DIASTOLIC BLOOD PRESSURE: 78 MMHG

## 2021-11-17 DIAGNOSIS — Z00.00 ANNUAL PHYSICAL EXAM: ICD-10-CM

## 2021-11-17 DIAGNOSIS — Z23 NEED FOR VARICELLA VACCINE: Primary | ICD-10-CM

## 2021-11-17 DIAGNOSIS — E78.5 DYSLIPIDEMIA: ICD-10-CM

## 2021-11-17 DIAGNOSIS — E11.69 TYPE 2 DIABETES MELLITUS WITH OTHER SPECIFIED COMPLICATION, WITHOUT LONG-TERM CURRENT USE OF INSULIN (HCC): ICD-10-CM

## 2021-11-17 DIAGNOSIS — I10 ESSENTIAL HYPERTENSION: ICD-10-CM

## 2021-11-17 DIAGNOSIS — E78.5 DYSLIPIDEMIA: Primary | ICD-10-CM

## 2021-11-17 DIAGNOSIS — E11.9 TYPE 2 DIABETES MELLITUS WITHOUT COMPLICATION, WITHOUT LONG-TERM CURRENT USE OF INSULIN (HCC): ICD-10-CM

## 2021-11-17 PROCEDURE — 82947 ASSAY GLUCOSE BLOOD QUANT: CPT | Performed by: INTERNAL MEDICINE

## 2021-11-17 PROCEDURE — 3074F SYST BP LT 130 MM HG: CPT | Performed by: INTERNAL MEDICINE

## 2021-11-17 PROCEDURE — 3078F DIAST BP <80 MM HG: CPT | Performed by: INTERNAL MEDICINE

## 2021-11-17 PROCEDURE — 99395 PREV VISIT EST AGE 18-39: CPT | Performed by: INTERNAL MEDICINE

## 2021-11-17 PROCEDURE — 83036 HEMOGLOBIN GLYCOSYLATED A1C: CPT | Performed by: INTERNAL MEDICINE

## 2021-11-17 PROCEDURE — 90716 VAR VACCINE LIVE SUBQ: CPT | Performed by: INTERNAL MEDICINE

## 2021-11-17 PROCEDURE — 3008F BODY MASS INDEX DOCD: CPT | Performed by: INTERNAL MEDICINE

## 2021-11-17 PROCEDURE — 3079F DIAST BP 80-89 MM HG: CPT | Performed by: INTERNAL MEDICINE

## 2021-11-17 PROCEDURE — 3052F HG A1C>EQUAL 8.0%<EQUAL 9.0%: CPT | Performed by: INTERNAL MEDICINE

## 2021-11-17 PROCEDURE — 90471 IMMUNIZATION ADMIN: CPT | Performed by: INTERNAL MEDICINE

## 2021-11-17 PROCEDURE — 36416 COLLJ CAPILLARY BLOOD SPEC: CPT | Performed by: INTERNAL MEDICINE

## 2021-11-17 PROCEDURE — 99214 OFFICE O/P EST MOD 30 MIN: CPT | Performed by: INTERNAL MEDICINE

## 2021-11-17 RX ORDER — IBUPROFEN 800 MG/1
800 TABLET ORAL DAILY PRN
Qty: 30 TABLET | Refills: 0 | Status: SHIPPED | OUTPATIENT
Start: 2021-11-17 | End: 2021-12-14

## 2021-11-17 RX ORDER — SEMAGLUTIDE 1.34 MG/ML
INJECTION, SOLUTION SUBCUTANEOUS
Qty: 4.5 ML | Refills: 0 | Status: SHIPPED | OUTPATIENT
Start: 2021-11-17 | End: 2022-01-24

## 2021-11-17 NOTE — PROGRESS NOTES
Margarita Pierce is a 39year old female. Patient presents with:  Physical: pt here for annual exam       HPI:   Margarita Pierce is a 39year old female who presents for a follow up visit.      Past medical history includes diabetes, dyslipidemia, obesity, GERD strip 0   • ONETOUCH DELICA LANCETS FINE Does not apply Misc 1 each by Finger stick route 2 (two) times daily.  200 each 0   • Glucose Blood In Vitro Strip Check BG 4 times a day 100 each 6   • ACCU-CHEK FASTCLIX LANCETS Does not apply Misc Check BG 4 times Paternal Uncle         lung, smoker      Social History:   Social History    Tobacco Use      Smoking status: Former Smoker        Packs/day: 0.00        Years: 12.00        Pack years: 0        Types: Cigarettes        Quit date: 3/25/2014        Years si spontaneously      ASSESSMENT AND PLAN:     1. Annual physical exam  Pap done 2/2019  Had updated HBV vaccines 2018  Needs covid booster--discussed this  Varicella vaccines--given first dose today; advised repeat in 4-8 weeks      2.  Type 2 diabetes keren

## 2021-11-17 NOTE — PROGRESS NOTES
Return Office Visit - Diabetes    CHIEF COMPLAINT:    DM   Dyslipidemia      HISTORY OF PRESENT ILLNESS:   Alfredo Robison is a 39year old female who presents for follow up for diabetes.         DM HISTORY  Diagnosed: at age 26    FH of DM: Parents have 81 mg by mouth daily. • hydrocortisone 2.5 % External Cream Apply 1 Application topically 2 (two) times daily.  1 Tube 0   • Glucose Blood (ONETOUCH VERIO) In Vitro Strip CHECK BLOOD SUGAR TWICE A  strip 0   • ONETOUCH DELICA LANCETS FINE Does no cellulitis,      PHYSICAL EXAM:     Vitals reviewed    General Appearance:  alert, well developed, in no acute distress  Nutritional:  no extreme weight gain or loss  Head: Atraumatic  Eyes:  normal conjunctivae, sclera. , normal sclera and normal pupils  T 40 mg daily. Discussed SE. She is not sexually active at present.  Understands that she should always use contraception to avoid unplanned pregnancy, especially given high a1c.     3. BMI > 50  She has lost weight   Provided encouragement to continue t

## 2021-12-08 DIAGNOSIS — E11.9 TYPE 2 DIABETES MELLITUS WITHOUT COMPLICATION, WITHOUT LONG-TERM CURRENT USE OF INSULIN (HCC): ICD-10-CM

## 2021-12-08 RX ORDER — GLIMEPIRIDE 4 MG/1
8 TABLET ORAL
Qty: 180 TABLET | Refills: 0 | Status: SHIPPED | OUTPATIENT
Start: 2021-12-08 | End: 2022-03-02

## 2021-12-10 ENCOUNTER — TELEPHONE (OUTPATIENT)
Dept: INTERNAL MEDICINE CLINIC | Facility: CLINIC | Age: 36
End: 2021-12-10

## 2021-12-14 RX ORDER — IBUPROFEN 800 MG/1
TABLET ORAL
Qty: 30 TABLET | Refills: 0 | Status: SHIPPED | OUTPATIENT
Start: 2021-12-14

## 2021-12-31 ENCOUNTER — PATIENT MESSAGE (OUTPATIENT)
Dept: INTERNAL MEDICINE CLINIC | Facility: CLINIC | Age: 36
End: 2021-12-31

## 2021-12-31 NOTE — TELEPHONE ENCOUNTER
From: Torres Higuera  To: Marquis Beti DO  Sent: 12/31/2021 1:30 PM CST  Subject: 2nd chicken pix shot    Hi,     Is there anyway you could reschedule my 2nd shot for January 17th after 4?     Volodymyr Has

## 2021-12-31 NOTE — TELEPHONE ENCOUNTER
To [employer] to advise on patient request. CDC recommends varicella vaccine doses to be 4-8 weeks apart. Patient had first dose on 11/17/21. Okay for second dose on 1/17/22?

## 2022-01-03 NOTE — TELEPHONE ENCOUNTER
To FD-  pls schedule pt as below request (ok to give second varicella in January) and then send her a mychart

## 2022-01-10 ENCOUNTER — TELEPHONE (OUTPATIENT)
Dept: INTERNAL MEDICINE CLINIC | Facility: CLINIC | Age: 37
End: 2022-01-10

## 2022-01-12 RX ORDER — OMEPRAZOLE 20 MG/1
CAPSULE, DELAYED RELEASE ORAL
Qty: 90 CAPSULE | Refills: 3 | Status: SHIPPED | OUTPATIENT
Start: 2022-01-12

## 2022-01-12 RX ORDER — ATORVASTATIN CALCIUM 40 MG/1
TABLET, FILM COATED ORAL
Qty: 90 TABLET | Refills: 3 | Status: SHIPPED | OUTPATIENT
Start: 2022-01-12

## 2022-01-12 NOTE — TELEPHONE ENCOUNTER
To  for periodic review of omeprazole and pepcid as you have not Rx'd omeprazole in a few years and not mentioned in recent OV note (note;  Pt has been on both in the past)

## 2022-01-12 NOTE — TELEPHONE ENCOUNTER
Lita Naranjo, please consult - LOV 11/17/21 with Dr. Tami Ac, per note:    \"5.  GERD   pepcid bid\"    I do not see pt was Rxed pepcid - please advise

## 2022-01-17 ENCOUNTER — PATIENT MESSAGE (OUTPATIENT)
Dept: INTERNAL MEDICINE CLINIC | Facility: CLINIC | Age: 37
End: 2022-01-17

## 2022-01-17 NOTE — TELEPHONE ENCOUNTER
From: Ruthann Genao  To: Ragini Gandhi DO  Sent: 1/17/2022 1:22 PM CST  Subject: Today's appointment     Blanca Smith called the office and spoke to Cleve Henry.  I was exposed to covid again this weekend, and she was looking into what i should do about my appointm

## 2022-01-23 ENCOUNTER — TELEPHONE (OUTPATIENT)
Dept: INTERNAL MEDICINE CLINIC | Facility: CLINIC | Age: 37
End: 2022-01-23

## 2022-01-24 ENCOUNTER — PATIENT MESSAGE (OUTPATIENT)
Dept: INTERNAL MEDICINE CLINIC | Facility: CLINIC | Age: 37
End: 2022-01-24

## 2022-01-24 RX ORDER — SEMAGLUTIDE 1.34 MG/ML
0.5 INJECTION, SOLUTION SUBCUTANEOUS WEEKLY
Qty: 4.5 ML | Refills: 0 | Status: SHIPPED | OUTPATIENT
Start: 2022-01-24 | End: 2022-02-15

## 2022-01-24 RX ORDER — DAPAGLIFLOZIN 10 MG/1
TABLET, FILM COATED ORAL
Qty: 90 TABLET | Refills: 0 | Status: SHIPPED | OUTPATIENT
Start: 2022-01-24 | End: 2022-04-27

## 2022-01-24 RX ORDER — IBUPROFEN 800 MG/1
TABLET ORAL
Qty: 30 TABLET | Refills: 0 | OUTPATIENT
Start: 2022-01-24

## 2022-01-31 ENCOUNTER — NURSE ONLY (OUTPATIENT)
Dept: INTERNAL MEDICINE CLINIC | Facility: CLINIC | Age: 37
End: 2022-01-31
Payer: COMMERCIAL

## 2022-01-31 DIAGNOSIS — Z23 NEED FOR VARICELLA VACCINE: Primary | ICD-10-CM

## 2022-01-31 PROCEDURE — 90471 IMMUNIZATION ADMIN: CPT | Performed by: INTERNAL MEDICINE

## 2022-01-31 PROCEDURE — 90716 VAR VACCINE LIVE SUBQ: CPT | Performed by: INTERNAL MEDICINE

## 2022-02-13 RX ORDER — LISINOPRIL 2.5 MG/1
TABLET ORAL
Qty: 90 TABLET | Refills: 3 | Status: SHIPPED | OUTPATIENT
Start: 2022-02-13

## 2022-02-15 ENCOUNTER — OFFICE VISIT (OUTPATIENT)
Dept: ENDOCRINOLOGY CLINIC | Facility: CLINIC | Age: 37
End: 2022-02-15
Payer: COMMERCIAL

## 2022-02-15 VITALS
BODY MASS INDEX: 50 KG/M2 | WEIGHT: 293 LBS | DIASTOLIC BLOOD PRESSURE: 92 MMHG | HEART RATE: 96 BPM | SYSTOLIC BLOOD PRESSURE: 141 MMHG

## 2022-02-15 DIAGNOSIS — E11.9 TYPE 2 DIABETES MELLITUS WITHOUT COMPLICATION, WITHOUT LONG-TERM CURRENT USE OF INSULIN (HCC): Primary | ICD-10-CM

## 2022-02-15 DIAGNOSIS — E78.5 DYSLIPIDEMIA: ICD-10-CM

## 2022-02-15 LAB
CARTRIDGE LOT#: ABNORMAL NUMERIC
GLUCOSE BLOOD: 368
HEMOGLOBIN A1C: 9.5 % (ref 4.3–5.6)
TEST STRIP LOT #: NORMAL NUMERIC

## 2022-02-15 PROCEDURE — 36416 COLLJ CAPILLARY BLOOD SPEC: CPT | Performed by: INTERNAL MEDICINE

## 2022-02-15 PROCEDURE — 83036 HEMOGLOBIN GLYCOSYLATED A1C: CPT | Performed by: INTERNAL MEDICINE

## 2022-02-15 PROCEDURE — 3077F SYST BP >= 140 MM HG: CPT | Performed by: INTERNAL MEDICINE

## 2022-02-15 PROCEDURE — 3080F DIAST BP >= 90 MM HG: CPT | Performed by: INTERNAL MEDICINE

## 2022-02-15 PROCEDURE — 99214 OFFICE O/P EST MOD 30 MIN: CPT | Performed by: INTERNAL MEDICINE

## 2022-02-15 PROCEDURE — 82947 ASSAY GLUCOSE BLOOD QUANT: CPT | Performed by: INTERNAL MEDICINE

## 2022-02-15 RX ORDER — SEMAGLUTIDE 1.34 MG/ML
1 INJECTION, SOLUTION SUBCUTANEOUS WEEKLY
Qty: 9 ML | Refills: 0 | Status: SHIPPED | OUTPATIENT
Start: 2022-02-15

## 2022-02-28 ENCOUNTER — PATIENT MESSAGE (OUTPATIENT)
Dept: INTERNAL MEDICINE CLINIC | Facility: CLINIC | Age: 37
End: 2022-02-28

## 2022-02-28 NOTE — TELEPHONE ENCOUNTER
From: Helena Tucker  To: Ana Shearer DO  Sent: 2/28/2022 12:29 PM CST  Subject: My Anxiety     Hi Dr. Sarah Washington,    My anxiety has been so bad lately, abs I was wondering if you could fit me in soon to talk about it? I work until 330 everyday, and Mondays, Tuesdays, and Wednesdays i would be available after 430, because I'm in the office.      Thanks, [employer]

## 2022-03-01 ENCOUNTER — TELEPHONE (OUTPATIENT)
Dept: INTERNAL MEDICINE CLINIC | Facility: CLINIC | Age: 37
End: 2022-03-01
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
## 2022-03-01 NOTE — PROGRESS NOTES
Virtual Telephone Check-In    Palomo Delacruz verbally consents to a Virtual/Telephone Check-In visit on 03/01/22. Patient has been referred to the [facility] website at [email or web address] to review the yearly Consent to Treat document. Patient understands and accepts financial responsibility for any deductible, co-insurance and/or co-pays associated with this service.     Duration of the service:  minutes      Summary of topics discussed:             Jeanne Weber DO

## 2022-03-02 RX ORDER — GLIMEPIRIDE 4 MG/1
8 TABLET ORAL
Qty: 180 TABLET | Refills: 0 | Status: SHIPPED | OUTPATIENT
Start: 2022-03-02

## 2022-04-09 PROCEDURE — 3061F NEG MICROALBUMINURIA REV: CPT | Performed by: INTERNAL MEDICINE

## 2022-04-09 PROCEDURE — 3046F HEMOGLOBIN A1C LEVEL >9.0%: CPT | Performed by: INTERNAL MEDICINE

## 2022-04-10 LAB
ABSOLUTE BASOPHILS: 31 CELLS/UL (ref 0–200)
ABSOLUTE EOSINOPHILS: 77 CELLS/UL (ref 15–500)
ABSOLUTE LYMPHOCYTES: 1863 CELLS/UL (ref 850–3900)
ABSOLUTE MONOCYTES: 539 CELLS/UL (ref 200–950)
ABSOLUTE NEUTROPHILS: 5190 CELLS/UL (ref 1500–7800)
ALBUMIN/GLOBULIN RATIO: 1.5 (CALC) (ref 1–2.5)
ALBUMIN: 4.1 G/DL (ref 3.6–5.1)
ALKALINE PHOSPHATASE: 88 U/L (ref 31–125)
ALT: 12 U/L (ref 6–29)
AST: 10 U/L (ref 10–30)
BASOPHILS: 0.4 %
BILIRUBIN, TOTAL: 0.3 MG/DL (ref 0.2–1.2)
BUN: 15 MG/DL (ref 7–25)
CALCIUM: 9.2 MG/DL (ref 8.6–10.2)
CARBON DIOXIDE: 23 MMOL/L (ref 20–32)
CHLORIDE: 103 MMOL/L (ref 98–110)
CHOL/HDLC RATIO: 3 (CALC)
CHOLESTEROL, TOTAL: 161 MG/DL
CREATININE, RANDOM URINE: 80 MG/DL (ref 20–275)
CREATININE: 0.63 MG/DL (ref 0.5–1.1)
EGFR IF AFRICN AM: 132 ML/MIN/1.73M2
EGFR IF NONAFRICN AM: 114 ML/MIN/1.73M2
EOSINOPHILS: 1 %
GLOBULIN: 2.7 G/DL (CALC) (ref 1.9–3.7)
GLUCOSE: 187 MG/DL (ref 65–99)
HDL CHOLESTEROL: 53 MG/DL
HEMATOCRIT: 38.6 % (ref 35–45)
HEMOGLOBIN A1C: 9.2 % OF TOTAL HGB
HEMOGLOBIN: 12.3 G/DL (ref 11.7–15.5)
LDL-CHOLESTEROL: 84 MG/DL (CALC)
LYMPHOCYTES: 24.2 %
MCH: 23.4 PG (ref 27–33)
MCHC: 31.9 G/DL (ref 32–36)
MCV: 73.5 FL (ref 80–100)
MICROALBUMIN/CREATININE RATIO, RANDOM URINE: 10 MCG/MG CREAT
MICROALBUMIN: 0.8 MG/DL
MONOCYTES: 7 %
MPV: 10.1 FL (ref 7.5–12.5)
NEUTROPHILS: 67.4 %
NON-HDL CHOLESTEROL: 108 MG/DL (CALC)
PLATELET COUNT: 314 THOUSAND/UL (ref 140–400)
POTASSIUM: 4.2 MMOL/L (ref 3.5–5.3)
PROTEIN, TOTAL: 6.8 G/DL (ref 6.1–8.1)
RDW: 16.3 % (ref 11–15)
RED BLOOD CELL COUNT: 5.25 MILLION/UL (ref 3.8–5.1)
SODIUM: 136 MMOL/L (ref 135–146)
TRIGLYCERIDES: 143 MG/DL
TSH W/REFLEX TO FT4: 4.06 MIU/L
WHITE BLOOD CELL COUNT: 7.7 THOUSAND/UL (ref 3.8–10.8)

## 2022-04-12 ENCOUNTER — TELEPHONE (OUTPATIENT)
Dept: INTERNAL MEDICINE CLINIC | Facility: CLINIC | Age: 37
End: 2022-04-12

## 2022-04-20 ENCOUNTER — OFFICE VISIT (OUTPATIENT)
Dept: INTERNAL MEDICINE CLINIC | Facility: CLINIC | Age: 37
End: 2022-04-20
Payer: COMMERCIAL

## 2022-04-20 VITALS
SYSTOLIC BLOOD PRESSURE: 128 MMHG | OXYGEN SATURATION: 96 % | HEIGHT: 64 IN | DIASTOLIC BLOOD PRESSURE: 70 MMHG | TEMPERATURE: 99 F | BODY MASS INDEX: 48.68 KG/M2 | HEART RATE: 80 BPM | WEIGHT: 285.13 LBS

## 2022-04-20 DIAGNOSIS — Z00.00 ANNUAL PHYSICAL EXAM: Primary | ICD-10-CM

## 2022-04-20 DIAGNOSIS — F41.9 ANXIETY: ICD-10-CM

## 2022-04-20 DIAGNOSIS — I10 ESSENTIAL HYPERTENSION: ICD-10-CM

## 2022-04-20 DIAGNOSIS — D64.9 ANEMIA, UNSPECIFIED TYPE: ICD-10-CM

## 2022-04-20 DIAGNOSIS — E11.9 TYPE 2 DIABETES MELLITUS WITHOUT COMPLICATION, WITHOUT LONG-TERM CURRENT USE OF INSULIN (HCC): ICD-10-CM

## 2022-04-20 DIAGNOSIS — E78.5 DYSLIPIDEMIA: ICD-10-CM

## 2022-04-20 PROCEDURE — 99395 PREV VISIT EST AGE 18-39: CPT | Performed by: INTERNAL MEDICINE

## 2022-04-20 PROCEDURE — 3078F DIAST BP <80 MM HG: CPT | Performed by: INTERNAL MEDICINE

## 2022-04-20 PROCEDURE — 3074F SYST BP LT 130 MM HG: CPT | Performed by: INTERNAL MEDICINE

## 2022-04-20 PROCEDURE — 3008F BODY MASS INDEX DOCD: CPT | Performed by: INTERNAL MEDICINE

## 2022-04-27 RX ORDER — DAPAGLIFLOZIN 10 MG/1
TABLET, FILM COATED ORAL
Qty: 90 TABLET | Refills: 0 | Status: SHIPPED | OUTPATIENT
Start: 2022-04-27

## 2022-04-30 LAB
% SATURATION: 7 % (CALC) (ref 16–45)
FERRITIN: 4 NG/ML (ref 16–154)
IRON BINDING CAPACITY: 391 MCG/DL (CALC) (ref 250–450)
IRON, TOTAL: 26 MCG/DL (ref 40–190)
VITAMIN B12: 255 PG/ML (ref 200–1100)

## 2022-05-03 ENCOUNTER — TELEPHONE (OUTPATIENT)
Dept: INTERNAL MEDICINE CLINIC | Facility: CLINIC | Age: 37
End: 2022-05-03

## 2022-05-03 RX ORDER — FERROUS SULFATE 325(65) MG
325 TABLET ORAL
Qty: 90 TABLET | Refills: 0 | Status: SHIPPED | OUTPATIENT
Start: 2022-05-03

## 2022-05-03 RX ORDER — CHOLECALCIFEROL (VITAMIN D3) 25 MCG
1 TABLET,CHEWABLE ORAL DAILY
Qty: 90 CAPSULE | Refills: 3 | Status: SHIPPED | OUTPATIENT
Start: 2022-05-03

## 2022-05-28 DIAGNOSIS — E11.9 TYPE 2 DIABETES MELLITUS WITHOUT COMPLICATION, WITHOUT LONG-TERM CURRENT USE OF INSULIN (HCC): ICD-10-CM

## 2022-05-29 RX ORDER — GLIMEPIRIDE 4 MG/1
8 TABLET ORAL
Qty: 180 TABLET | Refills: 0 | Status: SHIPPED | OUTPATIENT
Start: 2022-05-29

## 2022-05-31 ENCOUNTER — OFFICE VISIT (OUTPATIENT)
Dept: ENDOCRINOLOGY CLINIC | Facility: CLINIC | Age: 37
End: 2022-05-31
Payer: COMMERCIAL

## 2022-05-31 VITALS
HEART RATE: 82 BPM | BODY MASS INDEX: 50 KG/M2 | DIASTOLIC BLOOD PRESSURE: 96 MMHG | SYSTOLIC BLOOD PRESSURE: 147 MMHG | WEIGHT: 293 LBS

## 2022-05-31 DIAGNOSIS — E78.5 DYSLIPIDEMIA: ICD-10-CM

## 2022-05-31 DIAGNOSIS — E11.9 TYPE 2 DIABETES MELLITUS WITHOUT COMPLICATION, WITHOUT LONG-TERM CURRENT USE OF INSULIN (HCC): Primary | ICD-10-CM

## 2022-05-31 LAB
GLUCOSE BLOOD: 227
TEST STRIP LOT #: NORMAL NUMERIC

## 2022-05-31 PROCEDURE — 3080F DIAST BP >= 90 MM HG: CPT | Performed by: INTERNAL MEDICINE

## 2022-05-31 PROCEDURE — 82947 ASSAY GLUCOSE BLOOD QUANT: CPT | Performed by: INTERNAL MEDICINE

## 2022-05-31 PROCEDURE — 99214 OFFICE O/P EST MOD 30 MIN: CPT | Performed by: INTERNAL MEDICINE

## 2022-05-31 PROCEDURE — 3077F SYST BP >= 140 MM HG: CPT | Performed by: INTERNAL MEDICINE

## 2022-05-31 RX ORDER — SEMAGLUTIDE 2.68 MG/ML
2 INJECTION, SOLUTION SUBCUTANEOUS WEEKLY
Qty: 9 ML | Refills: 0 | Status: SHIPPED | OUTPATIENT
Start: 2022-05-31

## 2022-07-20 ENCOUNTER — TELEPHONE (OUTPATIENT)
Dept: INTERNAL MEDICINE CLINIC | Facility: CLINIC | Age: 37
End: 2022-07-20

## 2022-07-20 NOTE — TELEPHONE ENCOUNTER
Care gaps updated with most recent eye exam report received via fax. Report to MD for review prior to being sent to scan.

## 2022-07-30 RX ORDER — FERROUS SULFATE 325(65) MG
TABLET ORAL
Qty: 90 TABLET | Refills: 0 | OUTPATIENT
Start: 2022-07-30

## 2022-08-03 ENCOUNTER — PATIENT MESSAGE (OUTPATIENT)
Dept: INTERNAL MEDICINE CLINIC | Facility: CLINIC | Age: 37
End: 2022-08-03

## 2022-08-04 RX ORDER — DAPAGLIFLOZIN 10 MG/1
TABLET, FILM COATED ORAL
Qty: 90 TABLET | Refills: 0 | Status: SHIPPED | OUTPATIENT
Start: 2022-08-04 | End: 2022-10-28

## 2022-08-05 NOTE — TELEPHONE ENCOUNTER
From: Chetan Rendon  To: Dex Burciaga DO  Sent: 8/3/2022 8:00 PM CDT  Subject: Kiana Samuels Ear,    I know you declined my refill for Ferrous because the pharmacy sent it over multiple times. I do just need 1 refill on that, if you could please send it over? Also, can you please confirm you received my eye doctor appointment records?     Isis Fraga

## 2022-08-24 ENCOUNTER — OFFICE VISIT (OUTPATIENT)
Dept: INTERNAL MEDICINE CLINIC | Facility: CLINIC | Age: 37
End: 2022-08-24
Payer: COMMERCIAL

## 2022-08-24 VITALS
TEMPERATURE: 99 F | RESPIRATION RATE: 16 BRPM | HEIGHT: 64 IN | BODY MASS INDEX: 46.78 KG/M2 | SYSTOLIC BLOOD PRESSURE: 134 MMHG | OXYGEN SATURATION: 98 % | HEART RATE: 87 BPM | DIASTOLIC BLOOD PRESSURE: 88 MMHG | WEIGHT: 274 LBS

## 2022-08-24 DIAGNOSIS — K52.9 GASTROENTERITIS: Primary | ICD-10-CM

## 2022-08-24 PROCEDURE — 3008F BODY MASS INDEX DOCD: CPT | Performed by: INTERNAL MEDICINE

## 2022-08-24 PROCEDURE — 3079F DIAST BP 80-89 MM HG: CPT | Performed by: INTERNAL MEDICINE

## 2022-08-24 PROCEDURE — 99213 OFFICE O/P EST LOW 20 MIN: CPT | Performed by: INTERNAL MEDICINE

## 2022-08-24 PROCEDURE — 3075F SYST BP GE 130 - 139MM HG: CPT | Performed by: INTERNAL MEDICINE

## 2022-08-24 RX ORDER — SEMAGLUTIDE 2.68 MG/ML
2 INJECTION, SOLUTION SUBCUTANEOUS WEEKLY
Qty: 9 ML | Refills: 0 | Status: SHIPPED | OUTPATIENT
Start: 2022-08-24 | End: 2022-08-26

## 2022-08-24 RX ORDER — MELATONIN
1000 DAILY
COMMUNITY
Start: 2022-05-03 | End: 2022-08-24

## 2022-08-25 ENCOUNTER — PATIENT MESSAGE (OUTPATIENT)
Dept: ENDOCRINOLOGY CLINIC | Facility: CLINIC | Age: 37
End: 2022-08-25

## 2022-08-25 DIAGNOSIS — F41.9 ANXIETY: ICD-10-CM

## 2022-08-25 NOTE — TELEPHONE ENCOUNTER
From: Stinnett Patches  To: Diane Waters MD  Sent: 8/25/2022 4:40 PM CDT  Subject: Ozempic 2mg     Hi Dr. Ashanti Villatoro,    I just heard from [facility] that Ozempic 2mg is on back order until at least October. They wanted me to reach out to you and see what you wanted me to do?     Rubin Parrish

## 2022-08-26 ENCOUNTER — PATIENT MESSAGE (OUTPATIENT)
Dept: INTERNAL MEDICINE CLINIC | Facility: CLINIC | Age: 37
End: 2022-08-26

## 2022-08-26 ENCOUNTER — TELEPHONE (OUTPATIENT)
Dept: ENDOCRINOLOGY CLINIC | Facility: CLINIC | Age: 37
End: 2022-08-26

## 2022-08-26 DIAGNOSIS — F41.9 ANXIETY: ICD-10-CM

## 2022-08-26 RX ORDER — SEMAGLUTIDE 1.34 MG/ML
1 INJECTION, SOLUTION SUBCUTANEOUS WEEKLY
Qty: 9 ML | Refills: 0 | Status: SHIPPED | OUTPATIENT
Start: 2022-08-26

## 2022-08-26 RX ORDER — SEMAGLUTIDE 1.34 MG/ML
1 INJECTION, SOLUTION SUBCUTANEOUS WEEKLY
Refills: 0 | OUTPATIENT
Start: 2022-08-26

## 2022-08-26 RX ORDER — SEMAGLUTIDE 2.68 MG/ML
1 INJECTION, SOLUTION SUBCUTANEOUS WEEKLY
Qty: 9 ML | Refills: 0 | Status: CANCELLED
Start: 2022-08-26

## 2022-08-26 NOTE — TELEPHONE ENCOUNTER
Medication PA Requested:Semaglutide, 1 MG/DOSE, (OZEMPIC, 1 MG/DOSE,) 4 MG/3ML Subcutaneous Solution Pen-injector                                                          CoverMyMeds Used:  Key:  Quantity:9 mL  Day Supply:  Sig: Inject 1 mg into the skin once a week.   DX Code: E11.9                                 CPT code (if applicable):   Case Number/Pending Ref#:

## 2022-08-26 NOTE — TELEPHONE ENCOUNTER
To Chana Chen, pt started venlafaxine March 2022, has not been refilled yet, please advise if ok to cont? I did send pt damaso msg to get an update on how she's doing on it.

## 2022-08-26 NOTE — TELEPHONE ENCOUNTER
Yvette Garcia from [facility] calling to inform rx Ozempic for 1 MG will require a prior authorization. The other rx ozempic was on back order, so new script requires prior authorization. Please call at [phone].

## 2022-08-26 NOTE — TELEPHONE ENCOUNTER
Please see [employer] message below. The 2mg is backordered. Prescription pending for the 1mg medication for your review and sign. Currently it has the same dispense amount of 9ml.

## 2022-08-26 NOTE — TELEPHONE ENCOUNTER
From: Sade Braun  Sent: 8/25/2022 9:24 PM CDT  To: Haley Chu Clinical Staff  Subject: Ozempic 2mg     Hi,     I'm doing well. I hope you are as well. Let's go back to the 1mg until than.      Radhika Caraballo

## 2022-08-30 NOTE — TELEPHONE ENCOUNTER
Medication PA Requested:Semaglutide, 1 MG/DOSE, (OZEMPIC, 1 MG/DOSE,) 4 MG/3ML Subcutaneous Solution Pen-injector                                                          CoverMyMeds Used:  Key:  Quantity:9 mL  Day Supply:  Sig: Inject 1 mg into the skin once a week. DX Code: Christoph Serra, [phone], spoke with Cande Roberson. She states Ozempic 1mg does not need PA. She states getting paid claim and no PA required. Informed her the pharmacy called us and states PA required. She states no PA required. Call ref # none per [facility] [phone], per automated message \"am not able to connect your call right now, please try again later\". And call cut off. Called again and got same message. Will call later. 10:35 called again, spoke with Laurie Villa. She states it was processed and ready for . My chart message sent to patient that no PA required.

## 2022-08-31 RX ORDER — VENLAFAXINE HYDROCHLORIDE 37.5 MG/1
37.5 CAPSULE, EXTENDED RELEASE ORAL DAILY
Qty: 90 CAPSULE | Refills: 3 | Status: SHIPPED | OUTPATIENT
Start: 2022-08-31

## 2022-09-06 ENCOUNTER — OFFICE VISIT (OUTPATIENT)
Dept: ENDOCRINOLOGY CLINIC | Facility: CLINIC | Age: 37
End: 2022-09-06
Payer: COMMERCIAL

## 2022-09-06 VITALS
DIASTOLIC BLOOD PRESSURE: 85 MMHG | HEART RATE: 84 BPM | BODY MASS INDEX: 49 KG/M2 | SYSTOLIC BLOOD PRESSURE: 133 MMHG | WEIGHT: 284 LBS

## 2022-09-06 DIAGNOSIS — E11.9 TYPE 2 DIABETES MELLITUS WITHOUT COMPLICATION, WITHOUT LONG-TERM CURRENT USE OF INSULIN (HCC): Primary | ICD-10-CM

## 2022-09-06 DIAGNOSIS — E78.5 DYSLIPIDEMIA: ICD-10-CM

## 2022-09-06 LAB
CARTRIDGE LOT#: NORMAL NUMERIC
GLUCOSE BLOOD: 155
TEST STRIP LOT #: NORMAL NUMERIC

## 2022-09-06 PROCEDURE — 82947 ASSAY GLUCOSE BLOOD QUANT: CPT | Performed by: INTERNAL MEDICINE

## 2022-09-06 PROCEDURE — 3075F SYST BP GE 130 - 139MM HG: CPT | Performed by: INTERNAL MEDICINE

## 2022-09-06 PROCEDURE — 99214 OFFICE O/P EST MOD 30 MIN: CPT | Performed by: INTERNAL MEDICINE

## 2022-09-06 PROCEDURE — 3079F DIAST BP 80-89 MM HG: CPT | Performed by: INTERNAL MEDICINE

## 2022-09-06 PROCEDURE — 83036 HEMOGLOBIN GLYCOSYLATED A1C: CPT | Performed by: INTERNAL MEDICINE

## 2022-09-06 PROCEDURE — 3044F HG A1C LEVEL LT 7.0%: CPT | Performed by: INTERNAL MEDICINE

## 2022-09-06 RX ORDER — TIRZEPATIDE 2.5 MG/.5ML
2.5 INJECTION, SOLUTION SUBCUTANEOUS WEEKLY
Qty: 0.5 ML | Refills: 0 | Status: SHIPPED | OUTPATIENT
Start: 2022-09-06

## 2022-09-27 ENCOUNTER — PATIENT MESSAGE (OUTPATIENT)
Dept: INTERNAL MEDICINE CLINIC | Facility: CLINIC | Age: 37
End: 2022-09-27

## 2022-09-27 ENCOUNTER — APPOINTMENT (OUTPATIENT)
Dept: GENERAL RADIOLOGY | Age: 37
End: 2022-09-27
Attending: PHYSICIAN ASSISTANT
Payer: COMMERCIAL

## 2022-09-27 ENCOUNTER — HOSPITAL ENCOUNTER (OUTPATIENT)
Age: 37
Discharge: HOME | End: 2022-09-27
Payer: COMMERCIAL

## 2022-09-27 VITALS
BODY MASS INDEX: 46.61 KG/M2 | DIASTOLIC BLOOD PRESSURE: 98 MMHG | SYSTOLIC BLOOD PRESSURE: 149 MMHG | HEART RATE: 99 BPM | TEMPERATURE: 98 F | RESPIRATION RATE: 18 BRPM | HEIGHT: 64 IN | WEIGHT: 273 LBS | OXYGEN SATURATION: 98 %

## 2022-09-27 DIAGNOSIS — S99.921A FOOT INJURY, RIGHT, INITIAL ENCOUNTER: Primary | ICD-10-CM

## 2022-09-27 PROCEDURE — 99202 OFFICE O/P NEW SF 15 MIN: CPT | Performed by: PHYSICIAN ASSISTANT

## 2022-09-27 PROCEDURE — 73630 X-RAY EXAM OF FOOT: CPT | Performed by: PHYSICIAN ASSISTANT

## 2022-09-27 NOTE — TELEPHONE ENCOUNTER
From: Jose G Nieves  To: Margaret Orlando DO  Sent: 9/27/2022 4:43 PM CDT  Subject: My ankle/Foot    Hi Dr. Michael Davis     On Saturday I stepped wrong and hurt it pretty bad. I'm heading to immediate care in [place] to get it looked at. I just wanted to give you a heads up.      Masood Bravo

## 2022-09-27 NOTE — ED INITIAL ASSESSMENT (HPI)
Pt here w c/o R ankle pain after taking step wrong on Saturday. Pt denies twisting foot. States she was walking normally with shoes. Denies any swelling. States pain worse w walking.

## 2022-09-30 RX ORDER — VENLAFAXINE HYDROCHLORIDE 37.5 MG/1
CAPSULE, EXTENDED RELEASE ORAL
Qty: 90 CAPSULE | Refills: 1 | OUTPATIENT
Start: 2022-09-30

## 2022-09-30 NOTE — TELEPHONE ENCOUNTER
Per chart review, pt was evaluated at [facility], Xray foot negative for fracture. Pt was referred to podiatry at [facility].

## 2022-10-05 RX ORDER — TIRZEPATIDE 2.5 MG/.5ML
2.5 INJECTION, SOLUTION SUBCUTANEOUS WEEKLY
Qty: 2 ML | Refills: 0 | Status: SHIPPED | OUTPATIENT
Start: 2022-10-05

## 2022-10-20 ENCOUNTER — PATIENT MESSAGE (OUTPATIENT)
Dept: INTERNAL MEDICINE CLINIC | Facility: CLINIC | Age: 37
End: 2022-10-20

## 2022-10-20 ENCOUNTER — OFFICE VISIT (OUTPATIENT)
Dept: INTERNAL MEDICINE CLINIC | Facility: CLINIC | Age: 37
End: 2022-10-20
Payer: COMMERCIAL

## 2022-10-20 VITALS
DIASTOLIC BLOOD PRESSURE: 84 MMHG | BODY MASS INDEX: 47.46 KG/M2 | WEIGHT: 278 LBS | TEMPERATURE: 98 F | HEIGHT: 64 IN | HEART RATE: 107 BPM | OXYGEN SATURATION: 98 % | SYSTOLIC BLOOD PRESSURE: 118 MMHG

## 2022-10-20 DIAGNOSIS — D64.9 ANEMIA, UNSPECIFIED TYPE: ICD-10-CM

## 2022-10-20 DIAGNOSIS — E11.9 TYPE 2 DIABETES MELLITUS WITHOUT COMPLICATION, WITHOUT LONG-TERM CURRENT USE OF INSULIN (HCC): Primary | ICD-10-CM

## 2022-10-20 DIAGNOSIS — Z12.4 SCREENING FOR CERVICAL CANCER: ICD-10-CM

## 2022-10-20 PROCEDURE — 3079F DIAST BP 80-89 MM HG: CPT | Performed by: INTERNAL MEDICINE

## 2022-10-20 PROCEDURE — 3008F BODY MASS INDEX DOCD: CPT | Performed by: INTERNAL MEDICINE

## 2022-10-20 PROCEDURE — 3074F SYST BP LT 130 MM HG: CPT | Performed by: INTERNAL MEDICINE

## 2022-10-20 PROCEDURE — 99214 OFFICE O/P EST MOD 30 MIN: CPT | Performed by: INTERNAL MEDICINE

## 2022-10-21 ENCOUNTER — PATIENT MESSAGE (OUTPATIENT)
Dept: INTERNAL MEDICINE CLINIC | Facility: CLINIC | Age: 37
End: 2022-10-21

## 2022-10-21 RX ORDER — FLUTICASONE PROPIONATE 50 MCG
2 SPRAY, SUSPENSION (ML) NASAL DAILY
Qty: 3 EACH | Refills: 3 | Status: SHIPPED | OUTPATIENT
Start: 2022-10-21 | End: 2023-10-16

## 2022-10-21 NOTE — TELEPHONE ENCOUNTER
From: Shamar Raymundo  Sent: 10/21/2022 9:28 AM CDT  To: Haley [facility] Clinical Staff  Subject: Nasal Spray     Thank you so much lucy, you too

## 2022-10-21 NOTE — TELEPHONE ENCOUNTER
From: Emy Raymundo  To: Meg Trimble DO  Sent: 10/20/2022 8:02 PM CDT  Subject: Nasal Spray     Hi Dr. Emely Mahoney,     Just wondering if you were able to send my nasal spray prescription to [facility]?    [facility]

## 2022-10-28 RX ORDER — DAPAGLIFLOZIN 10 MG/1
TABLET, FILM COATED ORAL
Qty: 90 TABLET | Refills: 0 | Status: SHIPPED | OUTPATIENT
Start: 2022-10-28

## 2022-10-28 NOTE — TELEPHONE ENCOUNTER
LOV: 9/6/2022    RTC: 3 months     F/U: 12/13/2022    Dr Marla Francis-- orders pending, approve if appropriate

## 2022-10-31 ENCOUNTER — TELEPHONE (OUTPATIENT)
Dept: INTERNAL MEDICINE CLINIC | Facility: CLINIC | Age: 37
End: 2022-10-31

## 2022-10-31 LAB
HPV I/H RISK 1 DNA SPEC QL NAA+PROBE: NEGATIVE
LAST PAP RESULT: NORMAL

## 2022-10-31 RX ORDER — METRONIDAZOLE 500 MG/1
500 TABLET ORAL 2 TIMES DAILY
Qty: 14 TABLET | Refills: 0 | Status: SHIPPED | OUTPATIENT
Start: 2022-10-31

## 2022-11-01 ENCOUNTER — PATIENT MESSAGE (OUTPATIENT)
Dept: INTERNAL MEDICINE CLINIC | Facility: CLINIC | Age: 37
End: 2022-11-01

## 2022-11-01 NOTE — TELEPHONE ENCOUNTER
Patient called and relayed Dr Ananda Reddy message. Patient verbalized understanding with no further questions noted.

## 2022-11-01 NOTE — TELEPHONE ENCOUNTER
From: Ron Ramyundo  To: Roma Allen DO  Sent: 11/1/2022 10:00 AM CDT  Subject: METRONIDAZOLE    Hello,     What is METRONIDAZOLE prescribed to me for?     Radha Mckay

## 2022-11-01 NOTE — TELEPHONE ENCOUNTER
Please notify pt that pap smear was normal but the pathologist did notice a shift in her normal ira---this is bacterial vaginosis. This is not an STD, just some how an imbalance of the normal bacteria in the vagina area that can lead to a change in her vaginal discharge. The treatment is antibiotics---I am prescribing metronidazole twice daily for 7 days. That should help.

## 2022-11-02 DIAGNOSIS — E11.9 TYPE 2 DIABETES MELLITUS WITHOUT COMPLICATION, WITHOUT LONG-TERM CURRENT USE OF INSULIN (HCC): ICD-10-CM

## 2022-11-03 ENCOUNTER — PATIENT MESSAGE (OUTPATIENT)
Dept: ENDOCRINOLOGY CLINIC | Facility: CLINIC | Age: 37
End: 2022-11-03

## 2022-11-03 RX ORDER — FERROUS SULFATE 325(65) MG
TABLET ORAL
Qty: 90 TABLET | Refills: 0 | OUTPATIENT
Start: 2022-11-03

## 2022-11-03 RX ORDER — TIRZEPATIDE 2.5 MG/.5ML
2.5 INJECTION, SOLUTION SUBCUTANEOUS WEEKLY
Qty: 2 ML | Refills: 0 | Status: SHIPPED | OUTPATIENT
Start: 2022-11-03 | End: 2022-11-07

## 2022-11-03 RX ORDER — GLIMEPIRIDE 4 MG/1
8 TABLET ORAL
Qty: 180 TABLET | Refills: 0 | Status: SHIPPED | OUTPATIENT
Start: 2022-11-03

## 2022-11-03 NOTE — TELEPHONE ENCOUNTER
From: Ron Raymundo  To: Gertrudis Madrid MD  Sent: 11/3/2022 6:29 PM CDT  Subject: Heri Gals 2.5 MG    Rachel Tipton,    I just got word [facility] that Mounjaro 2.5 MG is on back order, I have been taking it for 2 months now, and I'm not sure if you want to up it to the 5MG now?     Let me know,   Margarito Hernandez

## 2022-11-07 RX ORDER — TIRZEPATIDE 5 MG/.5ML
5 INJECTION, SOLUTION SUBCUTANEOUS WEEKLY
Qty: 2 ML | Refills: 0 | Status: SHIPPED | OUTPATIENT
Start: 2022-11-07

## 2022-11-13 LAB
% SATURATION: 12 % (CALC) (ref 16–45)
ABSOLUTE BASOPHILS: 38 CELLS/UL (ref 0–200)
ABSOLUTE EOSINOPHILS: 113 CELLS/UL (ref 15–500)
ABSOLUTE LYMPHOCYTES: 1845 CELLS/UL (ref 850–3900)
ABSOLUTE MONOCYTES: 510 CELLS/UL (ref 200–950)
ABSOLUTE NEUTROPHILS: 4995 CELLS/UL (ref 1500–7800)
ALBUMIN/GLOBULIN RATIO: 1.5 (CALC) (ref 1–2.5)
ALBUMIN: 4.1 G/DL (ref 3.6–5.1)
ALKALINE PHOSPHATASE: 78 U/L (ref 31–125)
ALT: 17 U/L (ref 6–29)
AST: 11 U/L (ref 10–30)
BASOPHILS: 0.5 %
BILIRUBIN, TOTAL: 0.4 MG/DL (ref 0.2–1.2)
BUN: 14 MG/DL (ref 7–25)
CALCIUM: 9.3 MG/DL (ref 8.6–10.2)
CARBON DIOXIDE: 25 MMOL/L (ref 20–32)
CHLORIDE: 105 MMOL/L (ref 98–110)
CHOL/HDLC RATIO: 2.6 (CALC)
CHOLESTEROL, TOTAL: 139 MG/DL
CREATININE, RANDOM URINE: 65 MG/DL (ref 20–275)
CREATININE: 0.63 MG/DL (ref 0.5–0.97)
EGFR: 116 ML/MIN/1.73M2
EOSINOPHILS: 1.5 %
FERRITIN: 7 NG/ML (ref 16–154)
GLOBULIN: 2.7 G/DL (CALC) (ref 1.9–3.7)
GLUCOSE: 203 MG/DL (ref 65–99)
HDL CHOLESTEROL: 53 MG/DL
HEMATOCRIT: 42.5 % (ref 35–45)
HEMOGLOBIN: 13.8 G/DL (ref 11.7–15.5)
IRON BINDING CAPACITY: 324 MCG/DL (CALC) (ref 250–450)
IRON, TOTAL: 39 MCG/DL (ref 40–190)
LDL-CHOLESTEROL: 65 MG/DL (CALC)
LYMPHOCYTES: 24.6 %
MCH: 26.5 PG (ref 27–33)
MCHC: 32.5 G/DL (ref 32–36)
MCV: 81.7 FL (ref 80–100)
MICROALBUMIN/CREATININE RATIO, RANDOM URINE: 23 MCG/MG CREAT
MICROALBUMIN: 1.5 MG/DL
MONOCYTES: 6.8 %
MPV: 10.3 FL (ref 7.5–12.5)
NEUTROPHILS: 66.6 %
NON-HDL CHOLESTEROL: 86 MG/DL (CALC)
PLATELET COUNT: 295 THOUSAND/UL (ref 140–400)
POTASSIUM: 4.2 MMOL/L (ref 3.5–5.3)
PROTEIN, TOTAL: 6.8 G/DL (ref 6.1–8.1)
RDW: 13.7 % (ref 11–15)
RED BLOOD CELL COUNT: 5.2 MILLION/UL (ref 3.8–5.1)
SODIUM: 138 MMOL/L (ref 135–146)
TRIGLYCERIDES: 127 MG/DL
VITAMIN B12: 842 PG/ML (ref 200–1100)
WHITE BLOOD CELL COUNT: 7.5 THOUSAND/UL (ref 3.8–10.8)

## 2022-12-02 RX ORDER — TIRZEPATIDE 5 MG/.5ML
5 INJECTION, SOLUTION SUBCUTANEOUS WEEKLY
Qty: 2 ML | Refills: 0 | Status: SHIPPED | OUTPATIENT
Start: 2022-12-02

## 2022-12-02 NOTE — TELEPHONE ENCOUNTER
LOV 09/06/22 RTC 3months f/u 12/13/22    Per Gabriel Oddi  recommends increasing dose every month. Next dose 7.5mg, please advise if okay to increase.

## 2022-12-13 ENCOUNTER — PATIENT MESSAGE (OUTPATIENT)
Dept: ENDOCRINOLOGY CLINIC | Facility: CLINIC | Age: 37
End: 2022-12-13

## 2022-12-13 RX ORDER — BLOOD-GLUCOSE METER
2 EACH MISCELLANEOUS DAILY
Qty: 1 KIT | Refills: 0 | Status: SHIPPED | OUTPATIENT
Start: 2022-12-13

## 2022-12-13 NOTE — TELEPHONE ENCOUNTER
From: Dandy Raymundo  To: Debroah Ganser, MD  Sent: 12/13/2022 9:37 AM CST  Subject: Had to cancel today    Hi Dr. Kvng Cope,    I had to cancel my appointment with you today, because I'm not feeling well. The soonest i can get in to see you is April. I was wondering if I could get a prescription for a new glucose monitoring system? Mine broke recently and I was going to ask you for a new one.      Ginette Butler

## 2022-12-16 NOTE — TELEPHONE ENCOUNTER
Called to help schedule a fu appt, pt stated she was at work and would like to be called back after 3:30.

## 2022-12-19 ENCOUNTER — PATIENT MESSAGE (OUTPATIENT)
Dept: ENDOCRINOLOGY CLINIC | Facility: CLINIC | Age: 37
End: 2022-12-19

## 2022-12-19 DIAGNOSIS — E11.9 TYPE 2 DIABETES MELLITUS WITHOUT COMPLICATION, WITHOUT LONG-TERM CURRENT USE OF INSULIN (HCC): ICD-10-CM

## 2022-12-19 NOTE — TELEPHONE ENCOUNTER
From: Micki Raymundo  To: Cas Muller MD  Sent: 12/19/2022 8:35 AM CST  Subject: Testing strips    Hello,    I picked up my new one touch Verio Reflect blood glucose monitoring system, but I need test steps for them, can I please get a prescription for that too? Also, someone called me on Friday and I was supposed to get a call back after 330, but I never got a call back. Can someone call me after 330 today?     Masood Bravo

## 2022-12-22 RX ORDER — BLOOD SUGAR DIAGNOSTIC
STRIP MISCELLANEOUS
Qty: 100 STRIP | Refills: 2 | Status: SHIPPED | OUTPATIENT
Start: 2022-12-22

## 2022-12-22 NOTE — TELEPHONE ENCOUNTER
Test strip Rx sent. Per other encounter, patient needs FU appt in January. MyChart sent offering appts.

## 2022-12-29 NOTE — TELEPHONE ENCOUNTER
LOV: 9/6/2022    RTC: 3 months     F/U: 1/31/2023    Dr Paulina Anthony-- orders pending, approve if appropriate

## 2023-01-01 RX ORDER — TIRZEPATIDE 5 MG/.5ML
5 INJECTION, SOLUTION SUBCUTANEOUS WEEKLY
Qty: 2 ML | Refills: 0 | Status: SHIPPED | OUTPATIENT
Start: 2023-01-01

## 2023-01-16 RX ORDER — OMEPRAZOLE 20 MG/1
CAPSULE, DELAYED RELEASE ORAL
Qty: 90 CAPSULE | Refills: 3 | Status: SHIPPED | OUTPATIENT
Start: 2023-01-16

## 2023-01-16 RX ORDER — ATORVASTATIN CALCIUM 40 MG/1
TABLET, FILM COATED ORAL
Qty: 90 TABLET | Refills: 1 | Status: SHIPPED | OUTPATIENT
Start: 2023-01-16

## 2023-01-26 RX ORDER — TIRZEPATIDE 5 MG/.5ML
5 INJECTION, SOLUTION SUBCUTANEOUS WEEKLY
Qty: 2 ML | Refills: 0 | Status: SHIPPED | OUTPATIENT
Start: 2023-01-26

## 2023-01-26 NOTE — TELEPHONE ENCOUNTER
LOV:09/06/22    RTC:3months    F/U:01/31/23,04/04/23     Pending Monthly Supply: orders pending, please approve if appropriate.

## 2023-01-30 DIAGNOSIS — E11.9 TYPE 2 DIABETES MELLITUS WITHOUT COMPLICATION, WITHOUT LONG-TERM CURRENT USE OF INSULIN (HCC): ICD-10-CM

## 2023-01-30 RX ORDER — GLIMEPIRIDE 4 MG/1
8 TABLET ORAL
Qty: 180 TABLET | Refills: 0 | Status: SHIPPED | OUTPATIENT
Start: 2023-01-30

## 2023-01-31 ENCOUNTER — OFFICE VISIT (OUTPATIENT)
Dept: ENDOCRINOLOGY CLINIC | Facility: CLINIC | Age: 38
End: 2023-01-31

## 2023-01-31 ENCOUNTER — TELEPHONE (OUTPATIENT)
Dept: ENDOCRINOLOGY CLINIC | Facility: CLINIC | Age: 38
End: 2023-01-31

## 2023-01-31 VITALS
WEIGHT: 271.5 LBS | SYSTOLIC BLOOD PRESSURE: 141 MMHG | BODY MASS INDEX: 47 KG/M2 | HEART RATE: 76 BPM | DIASTOLIC BLOOD PRESSURE: 74 MMHG

## 2023-01-31 DIAGNOSIS — E78.5 DYSLIPIDEMIA: ICD-10-CM

## 2023-01-31 DIAGNOSIS — E11.65 UNCONTROLLED TYPE 2 DIABETES MELLITUS WITH HYPERGLYCEMIA (HCC): Primary | ICD-10-CM

## 2023-01-31 LAB
CARTRIDGE LOT#: ABNORMAL NUMERIC
GLUCOSE BLOOD: 132
HEMOGLOBIN A1C: 7.7 % (ref 4.3–5.6)
TEST STRIP LOT #: NORMAL NUMERIC

## 2023-01-31 PROCEDURE — 82947 ASSAY GLUCOSE BLOOD QUANT: CPT | Performed by: INTERNAL MEDICINE

## 2023-01-31 PROCEDURE — 3078F DIAST BP <80 MM HG: CPT | Performed by: INTERNAL MEDICINE

## 2023-01-31 PROCEDURE — 3051F HG A1C>EQUAL 7.0%<8.0%: CPT | Performed by: INTERNAL MEDICINE

## 2023-01-31 PROCEDURE — 83036 HEMOGLOBIN GLYCOSYLATED A1C: CPT | Performed by: INTERNAL MEDICINE

## 2023-01-31 PROCEDURE — 99214 OFFICE O/P EST MOD 30 MIN: CPT | Performed by: INTERNAL MEDICINE

## 2023-01-31 PROCEDURE — 3077F SYST BP >= 140 MM HG: CPT | Performed by: INTERNAL MEDICINE

## 2023-01-31 RX ORDER — TIRZEPATIDE 7.5 MG/.5ML
7.5 INJECTION, SOLUTION SUBCUTANEOUS WEEKLY
Qty: 6 ML | Refills: 0 | Status: SHIPPED | OUTPATIENT
Start: 2023-01-31

## 2023-01-31 NOTE — TELEPHONE ENCOUNTER
Received patient visit summary from [address] [place] [address] foot&ankle. Placed in providers folder for review.

## 2023-02-24 RX ORDER — LISINOPRIL 2.5 MG/1
2.5 TABLET ORAL DAILY
Qty: 90 TABLET | Refills: 3 | Status: SHIPPED | OUTPATIENT
Start: 2023-02-24

## 2023-02-28 ENCOUNTER — TELEPHONE (OUTPATIENT)
Dept: ENDOCRINOLOGY CLINIC | Facility: CLINIC | Age: 38
End: 2023-02-28

## 2023-02-28 NOTE — TELEPHONE ENCOUNTER
Patient states García Lealor rx is on back order and wants to know if dose can be decreased - from 7.5 to 5. .  Patient also requesting 90 days refills. Please call today, otherwise call tomorrow after 330pm.  Thank you.

## 2023-03-01 RX ORDER — TIRZEPATIDE 5 MG/.5ML
5 INJECTION, SOLUTION SUBCUTANEOUS
Qty: 6 ML | Refills: 0 | Status: SHIPPED | OUTPATIENT
Start: 2023-03-01

## 2023-03-01 NOTE — TELEPHONE ENCOUNTER
Tried calling patient and said she couldn't talk because she's currently in a meeting. RN sent [employer], which patient said it would be okay to do so.

## 2023-03-01 NOTE — TELEPHONE ENCOUNTER
Yes okay to do so   I recommend doing one month of the lower dose and trying to get the higher dose after that ) can check with other pharmacies) but if she wants a 90 days supply can do so   Thanks

## 2023-03-09 RX ORDER — BLOOD-GLUCOSE METER
1 KIT MISCELLANEOUS 2 TIMES DAILY
Qty: 1 KIT | Refills: 0 | Status: SHIPPED | OUTPATIENT
Start: 2023-03-09

## 2023-03-09 NOTE — TELEPHONE ENCOUNTER
LOV; 01/31/23    RTC;3months    F/U;05/30/23     Pending Monthly Supply;order pending, approve if appropriate.

## 2023-03-31 ENCOUNTER — OFFICE VISIT (OUTPATIENT)
Dept: INTERNAL MEDICINE CLINIC | Facility: CLINIC | Age: 38
End: 2023-03-31

## 2023-03-31 VITALS
TEMPERATURE: 99 F | BODY MASS INDEX: 45.62 KG/M2 | DIASTOLIC BLOOD PRESSURE: 82 MMHG | WEIGHT: 267.19 LBS | SYSTOLIC BLOOD PRESSURE: 126 MMHG | HEIGHT: 64 IN | HEART RATE: 99 BPM | OXYGEN SATURATION: 97 %

## 2023-03-31 DIAGNOSIS — D64.9 ANEMIA, UNSPECIFIED TYPE: ICD-10-CM

## 2023-03-31 DIAGNOSIS — Z00.00 ANNUAL PHYSICAL EXAM: Primary | ICD-10-CM

## 2023-03-31 DIAGNOSIS — I10 ESSENTIAL HYPERTENSION: ICD-10-CM

## 2023-03-31 DIAGNOSIS — E11.9 TYPE 2 DIABETES MELLITUS WITHOUT COMPLICATION, WITHOUT LONG-TERM CURRENT USE OF INSULIN (HCC): ICD-10-CM

## 2023-03-31 DIAGNOSIS — F41.9 ANXIETY: ICD-10-CM

## 2023-03-31 DIAGNOSIS — E78.5 DYSLIPIDEMIA: ICD-10-CM

## 2023-03-31 DIAGNOSIS — Z12.31 ENCOUNTER FOR SCREENING MAMMOGRAM FOR MALIGNANT NEOPLASM OF BREAST: ICD-10-CM

## 2023-03-31 DIAGNOSIS — Z12.39 ENCOUNTER FOR OTHER SCREENING FOR MALIGNANT NEOPLASM OF BREAST: ICD-10-CM

## 2023-03-31 PROCEDURE — 3074F SYST BP LT 130 MM HG: CPT | Performed by: INTERNAL MEDICINE

## 2023-03-31 PROCEDURE — 3079F DIAST BP 80-89 MM HG: CPT | Performed by: INTERNAL MEDICINE

## 2023-03-31 PROCEDURE — 99395 PREV VISIT EST AGE 18-39: CPT | Performed by: INTERNAL MEDICINE

## 2023-03-31 PROCEDURE — 3008F BODY MASS INDEX DOCD: CPT | Performed by: INTERNAL MEDICINE

## 2023-03-31 RX ORDER — VENLAFAXINE HYDROCHLORIDE 75 MG/1
75 CAPSULE, EXTENDED RELEASE ORAL DAILY
Qty: 90 CAPSULE | Refills: 1 | Status: SHIPPED | OUTPATIENT
Start: 2023-03-31

## 2023-03-31 RX ORDER — MELOXICAM 7.5 MG/1
7.5 TABLET ORAL DAILY
Qty: 30 TABLET | Refills: 1 | Status: SHIPPED | OUTPATIENT
Start: 2023-03-31

## 2023-04-05 ENCOUNTER — PATIENT MESSAGE (OUTPATIENT)
Dept: INTERNAL MEDICINE CLINIC | Facility: CLINIC | Age: 38
End: 2023-04-05

## 2023-04-05 NOTE — TELEPHONE ENCOUNTER
From: Demetrius Raymundo  To: Cheo Montejo DO  Sent: 4/5/2023 11:08 AM CDT  Subject: Can you call me? Dr. Tami Ac,     I know you are busy, but I was wondering if you could please give me a call after 330 today? I need to talk to you about getting a note to go to the bathroom at work. I hate that i need to ask for this, but I need it. Ugh. Can you call me today?     Randy Ramirez

## 2023-04-11 NOTE — TELEPHONE ENCOUNTER
To Dr. Abigail aJckson: new letter pended with requested additional information.  Please review/sign

## 2023-04-25 DIAGNOSIS — E11.9 TYPE 2 DIABETES MELLITUS WITHOUT COMPLICATION, WITHOUT LONG-TERM CURRENT USE OF INSULIN (HCC): ICD-10-CM

## 2023-04-25 RX ORDER — GLIMEPIRIDE 4 MG/1
8 TABLET ORAL
Qty: 180 TABLET | Refills: 0 | Status: SHIPPED | OUTPATIENT
Start: 2023-04-25

## 2023-05-01 RX ORDER — DAPAGLIFLOZIN 10 MG/1
TABLET, FILM COATED ORAL
Qty: 90 TABLET | Refills: 0 | Status: SHIPPED | OUTPATIENT
Start: 2023-05-01

## 2023-05-06 PROCEDURE — 3061F NEG MICROALBUMINURIA REV: CPT | Performed by: INTERNAL MEDICINE

## 2023-05-06 PROCEDURE — 3051F HG A1C>EQUAL 7.0%<8.0%: CPT | Performed by: INTERNAL MEDICINE

## 2023-05-07 LAB
% SATURATION: 14 % (CALC) (ref 16–45)
ABSOLUTE BASOPHILS: 29 CELLS/UL (ref 0–200)
ABSOLUTE EOSINOPHILS: 139 CELLS/UL (ref 15–500)
ABSOLUTE LYMPHOCYTES: 2022 CELLS/UL (ref 850–3900)
ABSOLUTE MONOCYTES: 460 CELLS/UL (ref 200–950)
ABSOLUTE NEUTROPHILS: 4650 CELLS/UL (ref 1500–7800)
ALBUMIN/GLOBULIN RATIO: 1.5 (CALC) (ref 1–2.5)
ALBUMIN: 3.8 G/DL (ref 3.6–5.1)
ALKALINE PHOSPHATASE: 80 U/L (ref 31–125)
ALT: 12 U/L (ref 6–29)
AST: 10 U/L (ref 10–30)
BASOPHILS: 0.4 %
BILIRUBIN, TOTAL: 0.3 MG/DL (ref 0.2–1.2)
BUN: 10 MG/DL (ref 7–25)
CALCIUM: 9.3 MG/DL (ref 8.6–10.2)
CARBON DIOXIDE: 25 MMOL/L (ref 20–32)
CHLORIDE: 105 MMOL/L (ref 98–110)
CHOL/HDLC RATIO: 2.9 (CALC)
CHOLESTEROL, TOTAL: 129 MG/DL
CREATININE, RANDOM URINE: 93 MG/DL (ref 20–275)
CREATININE: 0.55 MG/DL (ref 0.5–0.97)
EGFR: 120 ML/MIN/1.73M2
EOSINOPHILS: 1.9 %
FERRITIN: 14 NG/ML (ref 16–154)
GLOBULIN: 2.5 G/DL (CALC) (ref 1.9–3.7)
GLUCOSE: 128 MG/DL (ref 65–99)
HDL CHOLESTEROL: 44 MG/DL
HEMATOCRIT: 40 % (ref 35–45)
HEMOGLOBIN A1C: 7.3 % OF TOTAL HGB
HEMOGLOBIN: 12.8 G/DL (ref 11.7–15.5)
IRON BINDING CAPACITY: 312 MCG/DL (CALC) (ref 250–450)
IRON, TOTAL: 45 MCG/DL (ref 40–190)
LDL-CHOLESTEROL: 53 MG/DL (CALC)
LYMPHOCYTES: 27.7 %
MCH: 26.8 PG (ref 27–33)
MCHC: 32 G/DL (ref 32–36)
MCV: 83.7 FL (ref 80–100)
MICROALBUMIN/CREATININE RATIO, RANDOM URINE: 8 MCG/MG CREAT
MICROALBUMIN: 0.7 MG/DL
MONOCYTES: 6.3 %
MPV: 10 FL (ref 7.5–12.5)
NEUTROPHILS: 63.7 %
NON-HDL CHOLESTEROL: 85 MG/DL (CALC)
PLATELET COUNT: 281 THOUSAND/UL (ref 140–400)
POTASSIUM: 4.9 MMOL/L (ref 3.5–5.3)
PROTEIN, TOTAL: 6.3 G/DL (ref 6.1–8.1)
RDW: 14.3 % (ref 11–15)
RED BLOOD CELL COUNT: 4.78 MILLION/UL (ref 3.8–5.1)
SODIUM: 140 MMOL/L (ref 135–146)
TRIGLYCERIDES: 272 MG/DL
TSH W/REFLEX TO FT4: 1.92 MIU/L
VITAMIN B12: 375 PG/ML (ref 200–1100)
WHITE BLOOD CELL COUNT: 7.3 THOUSAND/UL (ref 3.8–10.8)

## 2023-05-19 ENCOUNTER — TELEPHONE (OUTPATIENT)
Dept: INTERNAL MEDICINE CLINIC | Facility: CLINIC | Age: 38
End: 2023-05-19

## 2023-05-19 NOTE — TELEPHONE ENCOUNTER
Called patient and aware that the Physician result form is completed and faxed successfully today to [phone]. Scanned into chart.

## 2023-05-30 ENCOUNTER — OFFICE VISIT (OUTPATIENT)
Dept: ENDOCRINOLOGY CLINIC | Facility: CLINIC | Age: 38
End: 2023-05-30

## 2023-05-30 VITALS
HEART RATE: 93 BPM | DIASTOLIC BLOOD PRESSURE: 82 MMHG | BODY MASS INDEX: 47 KG/M2 | SYSTOLIC BLOOD PRESSURE: 134 MMHG | WEIGHT: 271 LBS

## 2023-05-30 DIAGNOSIS — E78.5 DYSLIPIDEMIA: ICD-10-CM

## 2023-05-30 DIAGNOSIS — E11.9 TYPE 2 DIABETES MELLITUS WITHOUT COMPLICATION, WITHOUT LONG-TERM CURRENT USE OF INSULIN (HCC): Primary | ICD-10-CM

## 2023-05-30 LAB
GLUCOSE BLOOD: 217
TEST STRIP LOT #: NORMAL NUMERIC

## 2023-05-30 PROCEDURE — 3079F DIAST BP 80-89 MM HG: CPT | Performed by: INTERNAL MEDICINE

## 2023-05-30 PROCEDURE — 82947 ASSAY GLUCOSE BLOOD QUANT: CPT | Performed by: INTERNAL MEDICINE

## 2023-05-30 PROCEDURE — 3075F SYST BP GE 130 - 139MM HG: CPT | Performed by: INTERNAL MEDICINE

## 2023-05-30 PROCEDURE — 99213 OFFICE O/P EST LOW 20 MIN: CPT | Performed by: INTERNAL MEDICINE

## 2023-06-19 RX ORDER — BLOOD-GLUCOSE METER
KIT MISCELLANEOUS
Qty: 1 KIT | Refills: 0 | Status: SHIPPED | OUTPATIENT
Start: 2023-06-19

## 2023-06-21 RX ORDER — TIRZEPATIDE 5 MG/.5ML
5 INJECTION, SOLUTION SUBCUTANEOUS
Qty: 2 ML | Refills: 2 | Status: SHIPPED | OUTPATIENT
Start: 2023-06-21

## 2023-07-12 ENCOUNTER — PATIENT MESSAGE (OUTPATIENT)
Dept: INTERNAL MEDICINE CLINIC | Facility: CLINIC | Age: 38
End: 2023-07-12

## 2023-07-13 NOTE — TELEPHONE ENCOUNTER
From: Emy Raymundo  To: Meg Trimble DO  Sent: 7/12/2023 5:28 PM CDT  Subject: 2 quick questions    Hi,    This is for anyone. Can I get the fax number, so i can send over my eye exam information? And when can people start making appointments for the new doctor? I have a patient for her. Thanks everyone. Have a great week.    Omaira West

## 2023-07-16 RX ORDER — ATORVASTATIN CALCIUM 40 MG/1
40 TABLET, FILM COATED ORAL NIGHTLY
Qty: 90 TABLET | Refills: 1 | Status: SHIPPED | OUTPATIENT
Start: 2023-07-16

## 2023-07-18 DIAGNOSIS — E11.9 TYPE 2 DIABETES MELLITUS WITHOUT COMPLICATION, WITHOUT LONG-TERM CURRENT USE OF INSULIN (HCC): ICD-10-CM

## 2023-07-19 RX ORDER — GLIMEPIRIDE 4 MG/1
8 TABLET ORAL
Qty: 180 TABLET | Refills: 0 | Status: SHIPPED | OUTPATIENT
Start: 2023-07-19

## 2023-07-19 NOTE — TELEPHONE ENCOUNTER
LOV: 5/30/2023    RTC: 4 Months    FU: 9/16/2023    Last Refill: 4/25/2023    3 Month Supply Pending

## 2023-07-20 ENCOUNTER — MED REC SCAN ONLY (OUTPATIENT)
Dept: INTERNAL MEDICINE CLINIC | Facility: CLINIC | Age: 38
End: 2023-07-20

## 2023-08-08 ENCOUNTER — PATIENT MESSAGE (OUTPATIENT)
Dept: ENDOCRINOLOGY CLINIC | Facility: CLINIC | Age: 38
End: 2023-08-08

## 2023-08-08 ENCOUNTER — PATIENT MESSAGE (OUTPATIENT)
Dept: INTERNAL MEDICINE CLINIC | Facility: CLINIC | Age: 38
End: 2023-08-08

## 2023-08-09 DIAGNOSIS — F41.9 ANXIETY: ICD-10-CM

## 2023-08-09 NOTE — TELEPHONE ENCOUNTER
From: Ron Raymundo  To: Gertrudis Madrid MD  Sent: 8/8/2023 7:50 PM CDT  Subject: Jeremy Bishop 149,     I just wanted to let you know that moving forward, [facility] ON [facility] [address] [facility] ON [facility], [facility] [address].      Thank Yair Denise

## 2023-08-09 NOTE — TELEPHONE ENCOUNTER
From: Charmayne Hickory Becci  To: Litzy Webster DO  Sent: 8/8/2023 7:49 PM CDT  Subject: Jeremy Bishop 149,     I just wanted to let you know that moving forward, [facility] ON [facility] [address] [facility], [facility] [address].      Thank Andres Negrete

## 2023-08-11 RX ORDER — VENLAFAXINE HYDROCHLORIDE 75 MG/1
75 CAPSULE, EXTENDED RELEASE ORAL DAILY
Qty: 90 CAPSULE | Refills: 1 | Status: SHIPPED | OUTPATIENT
Start: 2023-08-11

## 2023-08-11 NOTE — TELEPHONE ENCOUNTER
Refill request is for a maintenance medication and has met the criteria specified in the Ambulatory Medication Refill Standing Order for eligibility, visits, laboratory, alerts and was sent to the requested pharmacy. Requested Prescriptions     Signed Prescriptions Disp Refills    venlafaxine ER 75 MG Oral Capsule SR 24 Hr 90 capsule 1     Sig: Take 1 capsule (75 mg total) by mouth daily.      Authorizing Provider: Jaqueline Emmanuel     Ordering User: James Elias

## 2023-08-12 DIAGNOSIS — E11.9 TYPE 2 DIABETES MELLITUS WITHOUT COMPLICATION, WITHOUT LONG-TERM CURRENT USE OF INSULIN (HCC): ICD-10-CM

## 2023-08-12 NOTE — TELEPHONE ENCOUNTER
Current Outpatient Medications   Medication Sig Dispense Refill    glimepiride 4 MG Oral Tab Take 2 tablets (8 mg total) by mouth before breakfast. 180 tablet 0    Glucose Blood (ONETOUCH VERIO) In Vitro Strip Use to check blood sugar daily.  100 strip 2

## 2023-08-14 RX ORDER — GLIMEPIRIDE 4 MG/1
8 TABLET ORAL
Qty: 180 TABLET | Refills: 0 | Status: SHIPPED | OUTPATIENT
Start: 2023-08-14

## 2023-08-14 RX ORDER — BLOOD SUGAR DIAGNOSTIC
STRIP MISCELLANEOUS
Qty: 100 STRIP | Refills: 2 | Status: SHIPPED | OUTPATIENT
Start: 2023-08-14

## 2023-09-14 RX ORDER — TIRZEPATIDE 5 MG/.5ML
5 INJECTION, SOLUTION SUBCUTANEOUS WEEKLY
Qty: 2 ML | Refills: 1 | Status: SHIPPED | OUTPATIENT
Start: 2023-09-14

## 2023-09-16 ENCOUNTER — OFFICE VISIT (OUTPATIENT)
Dept: ENDOCRINOLOGY CLINIC | Facility: CLINIC | Age: 38
End: 2023-09-16

## 2023-09-16 VITALS — HEART RATE: 96 BPM | SYSTOLIC BLOOD PRESSURE: 118 MMHG | DIASTOLIC BLOOD PRESSURE: 78 MMHG

## 2023-09-16 DIAGNOSIS — E11.9 TYPE 2 DIABETES MELLITUS WITHOUT COMPLICATION, WITHOUT LONG-TERM CURRENT USE OF INSULIN (HCC): Primary | ICD-10-CM

## 2023-09-16 DIAGNOSIS — E78.5 DYSLIPIDEMIA: ICD-10-CM

## 2023-09-16 LAB
CARTRIDGE LOT#: ABNORMAL NUMERIC
GLUCOSE BLOOD: 149
HEMOGLOBIN A1C: 6.6 % (ref 4.3–5.6)
TEST STRIP LOT #: NORMAL NUMERIC

## 2023-09-16 PROCEDURE — 83036 HEMOGLOBIN GLYCOSYLATED A1C: CPT | Performed by: INTERNAL MEDICINE

## 2023-09-16 PROCEDURE — 82947 ASSAY GLUCOSE BLOOD QUANT: CPT | Performed by: INTERNAL MEDICINE

## 2023-09-16 PROCEDURE — 3078F DIAST BP <80 MM HG: CPT | Performed by: INTERNAL MEDICINE

## 2023-09-16 PROCEDURE — 3074F SYST BP LT 130 MM HG: CPT | Performed by: INTERNAL MEDICINE

## 2023-09-16 PROCEDURE — 99213 OFFICE O/P EST LOW 20 MIN: CPT | Performed by: INTERNAL MEDICINE

## 2023-09-16 PROCEDURE — 3044F HG A1C LEVEL LT 7.0%: CPT | Performed by: INTERNAL MEDICINE

## 2023-10-15 RX ORDER — TIRZEPATIDE 5 MG/.5ML
5 INJECTION, SOLUTION SUBCUTANEOUS WEEKLY
Qty: 6 ML | Refills: 0 | Status: SHIPPED | OUTPATIENT
Start: 2023-10-15

## 2023-12-18 ENCOUNTER — PATIENT MESSAGE (OUTPATIENT)
Dept: INTERNAL MEDICINE CLINIC | Facility: CLINIC | Age: 38
End: 2023-12-18

## 2023-12-18 NOTE — TELEPHONE ENCOUNTER
From: Alf Raymundo  To: Ophelia Arita  Sent: 12/18/2023 12:36 PM CST  Subject: My anxiety     Hi Dr. Niraj Mares,    I wanted to either come in and talk to you about my anxiety and the medication or maybe have a phone call soon? Let me know.      Emily Garibay

## 2023-12-18 NOTE — TELEPHONE ENCOUNTER
LMTCB    Advised pt call back and schedule office visit or telephone visit. If she has any questions/concerns/worsening symptoms -- advised pt in voicemail to speak with a nurse.

## 2023-12-19 RX ORDER — OMEPRAZOLE 20 MG/1
20 CAPSULE, DELAYED RELEASE ORAL DAILY
Qty: 90 CAPSULE | Refills: 3 | Status: CANCELLED | OUTPATIENT
Start: 2023-12-19

## 2023-12-19 RX ORDER — OMEPRAZOLE 20 MG/1
CAPSULE, DELAYED RELEASE ORAL
Qty: 90 CAPSULE | Refills: 3 | OUTPATIENT
Start: 2023-12-19

## 2023-12-19 RX ORDER — OMEPRAZOLE 20 MG/1
20 CAPSULE, DELAYED RELEASE ORAL DAILY
Qty: 90 CAPSULE | Refills: 3 | Status: SHIPPED | OUTPATIENT
Start: 2023-12-19

## 2023-12-19 NOTE — TELEPHONE ENCOUNTER
Current refill request refused due to refill is either a duplicate request or has active refills at the pharmacy. Check previous templates.     Requested Prescriptions     Refused Prescriptions Disp Refills    OMEPRAZOLE 20 MG Oral Capsule Delayed Release [Pharmacy Med Name: OMEPRAZOLE DR 20 MG CAPSULE] 90 capsule 3     Sig: TAKE 1 CAPSULE BY MOUTH EVERY DAY     Refused By: Eleazar Sim     Reason for Refusal: Request already responded to by other means (e.g. phone or fax)

## 2023-12-19 NOTE — TELEPHONE ENCOUNTER
Already sent today    Current refill request refused due to refill is either a duplicate request or has active refills at the pharmacy. Check previous templates. Requested Prescriptions     Pending Prescriptions Disp Refills    omeprazole 20 MG Oral Capsule Delayed Release 90 capsule 3     Sig: Take 1 capsule (20 mg total) by mouth daily. No

## 2023-12-19 NOTE — TELEPHONE ENCOUNTER
Refill request is for a maintenance medication and has met the criteria specified in the Ambulatory Medication Refill Standing Order for eligibility, visits, laboratory, alerts and was sent to the requested pharmacy. Requested Prescriptions     Signed Prescriptions Disp Refills    omeprazole 20 MG Oral Capsule Delayed Release 90 capsule 3     Sig: Take 1 capsule (20 mg total) by mouth daily.      Authorizing Provider: Sonia Enamorado     Ordering User: [employer]: [employer] message sent

## 2023-12-20 NOTE — TELEPHONE ENCOUNTER
Pt. Called back. Please return her call to [phone]. She tentatively set an appt. To see Dr. Michael Davis on Friday but is requesting to speak with the nurse today.

## 2023-12-22 ENCOUNTER — TELEPHONE (OUTPATIENT)
Dept: INTERNAL MEDICINE CLINIC | Facility: CLINIC | Age: 38
End: 2023-12-22

## 2023-12-22 DIAGNOSIS — Z00.00 ANNUAL PHYSICAL EXAM: Primary | ICD-10-CM

## 2023-12-22 NOTE — TELEPHONE ENCOUNTER
Nurse attempted to reach patient as requested, see below. Patient has appointment scheduled to see Dr Abril Connell today.

## 2023-12-28 DIAGNOSIS — E11.9 TYPE 2 DIABETES MELLITUS WITHOUT COMPLICATION, WITHOUT LONG-TERM CURRENT USE OF INSULIN (HCC): ICD-10-CM

## 2023-12-29 RX ORDER — GLIMEPIRIDE 4 MG/1
8 TABLET ORAL
Qty: 180 TABLET | Refills: 0 | Status: SHIPPED | OUTPATIENT
Start: 2023-12-29

## 2023-12-29 RX ORDER — ATORVASTATIN CALCIUM 40 MG/1
40 TABLET, FILM COATED ORAL NIGHTLY
Qty: 90 TABLET | Refills: 1 | Status: SHIPPED | OUTPATIENT
Start: 2023-12-29

## 2024-01-25 RX ORDER — TIRZEPATIDE 5 MG/.5ML
5 INJECTION, SOLUTION SUBCUTANEOUS WEEKLY
Qty: 2 ML | Refills: 1 | Status: SHIPPED | OUTPATIENT
Start: 2024-01-25

## 2024-02-20 ENCOUNTER — OFFICE VISIT (OUTPATIENT)
Dept: ENDOCRINOLOGY CLINIC | Facility: CLINIC | Age: 39
End: 2024-02-20
Payer: COMMERCIAL

## 2024-02-20 VITALS
HEIGHT: 64 IN | DIASTOLIC BLOOD PRESSURE: 82 MMHG | SYSTOLIC BLOOD PRESSURE: 116 MMHG | BODY MASS INDEX: 43.02 KG/M2 | WEIGHT: 252 LBS | HEART RATE: 97 BPM

## 2024-02-20 DIAGNOSIS — E11.9 TYPE 2 DIABETES MELLITUS WITHOUT COMPLICATION, WITHOUT LONG-TERM CURRENT USE OF INSULIN (HCC): Primary | ICD-10-CM

## 2024-02-20 DIAGNOSIS — E78.5 DYSLIPIDEMIA: ICD-10-CM

## 2024-02-20 LAB
CARTRIDGE LOT#: ABNORMAL NUMERIC
GLUCOSE BLOOD: 108
HEMOGLOBIN A1C: 7 % (ref 4.3–5.6)
TEST STRIP LOT #: NORMAL NUMERIC

## 2024-02-20 PROCEDURE — 99213 OFFICE O/P EST LOW 20 MIN: CPT | Performed by: INTERNAL MEDICINE

## 2024-02-20 PROCEDURE — 83036 HEMOGLOBIN GLYCOSYLATED A1C: CPT | Performed by: INTERNAL MEDICINE

## 2024-02-20 PROCEDURE — 82947 ASSAY GLUCOSE BLOOD QUANT: CPT | Performed by: INTERNAL MEDICINE

## 2024-02-20 RX ORDER — TIRZEPATIDE 7.5 MG/.5ML
7.5 INJECTION, SOLUTION SUBCUTANEOUS WEEKLY
Qty: 6 ML | Refills: 0 | Status: SHIPPED | OUTPATIENT
Start: 2024-02-20

## 2024-02-20 NOTE — PROGRESS NOTES
Return Office Visit - Diabetes    CHIEF COMPLAINT:    DM   Dyslipidemia      HISTORY OF PRESENT ILLNESS:   Anjana Raymundo is a 38 year old female who presents for follow up for diabetes.        DM HISTORY  Diagnosed: at age 29    FH of DM: Parents have DM.      HISTORY OF DIABETES COMPLICATIONS: :  History of Retinopathy: No, summer 2023  History of Neuropathy: No   History of Nephropathy: No     ASSOCIATED COMPLICATIONS:    HTN: Yes  Hyperlipidemia: Yes  Coronary Artery Disease:  No   Cerebrovascular Disease: No       HOME GLUCOSE READINGS:     Not checking on a regular basis    CURRENT DIABETIC MEDICATIONS INCLUDE:    Glimepiride 8 mg daily, MTF 1000 mg BID, Farxiga 10 mg daily, mounjaro 5 mg once a week    MEALS:  Moderate compliance  States that mounjaro helps with appetite suppression, however, she has lately been feeling like her appetite is coming back     EXERCISE:  No regular exercise, but has been trying to stay active    She has regular cycles  She is not sexually active at this time  No plans to get pregnant in the future    CURRENT MEDICATIONS:    Current Outpatient Medications   Medication Sig Dispense Refill    Tirzepatide (MOUNJARO) 7.5 MG/0.5ML Subcutaneous Solution Pen-injector Inject 7.5 mg into the skin once a week. 6 mL 0    atorvastatin 40 MG Oral Tab Take 1 tablet (40 mg total) by mouth nightly. 90 tablet 1    glimepiride 4 MG Oral Tab Take 2 tablets (8 mg total) by mouth before breakfast. 180 tablet 0    dapagliflozin (FARXIGA) 10 MG Oral Tab Take 1 tablet (10 mg total) by mouth daily. 90 tablet 0    buPROPion  MG Oral Tablet 24 Hr Take 1 tablet (150 mg total) by mouth daily. 90 tablet 3    ALPRAZolam (XANAX) 0.25 MG Oral Tab Take 1 tablet (0.25 mg total) by mouth nightly as needed. 30 tablet 0    omeprazole 20 MG Oral Capsule Delayed Release Take 1 capsule (20 mg total) by mouth daily. 90 capsule 3    metFORMIN HCl 1000 MG Oral Tab TAKE 1 TABLET BY MOUTH TWICE A  tablet 0     Glucose Blood (ONETOUCH VERIO) In Vitro Strip Use to check blood sugar daily. 100 strip 2    venlafaxine ER 75 MG Oral Capsule SR 24 Hr Take 1 capsule (75 mg total) by mouth daily. 90 capsule 1    ONETOUCH VERIO REFLECT w/Device Does not apply Kit USING TWICE DAILY 1 kit 0    Meloxicam 7.5 MG Oral Tab Take 1 tablet (7.5 mg total) by mouth daily. 30 tablet 1    lisinopril 2.5 MG Oral Tab Take 1 tablet (2.5 mg total) by mouth daily. 90 tablet 3    diphenhydrAMINE HCl (ALLERGY MEDICINE OR) 1 tablet daily      BIOTIN 5000 OR Take 1 tablet by mouth daily.      IBUPROFEN 800 MG Oral Tab TAKE 1 TABLET BY MOUTH DAILY AS NEEDED FOR PAIN. 30 tablet 0    aspirin 81 MG Oral Tab Take 1 tablet (81 mg total) by mouth daily.         PAST MEDICAL, SOCIAL AND FAMILY HISTORY:  See past medical history marked as reviewed.  See past surgical history marked as reviewed.  See past family history marked as reviewed.  See past social history marked as reviewed.    ASSESSMENTS:      REVIEW OF SYSTEMS:  Constitutional: Negative for:  fever, fatigue, cold/heat intolerance, weight loss intentional  Eyes: Negative for:  Visual changes, proptosis, blurring  ENT: Negative for:  dysphagia, neck swelling, dysphonia  Respiratory: Negative for:  dyspnea, cough  Cardiovascular: Negative for:  chest pain, palpitations, orthopnea  GI: Negative for:  abdominal pain, nausea, vomiting, diarrhea, constipation, bleeding  Neurology: Negative for: headache, numbness, weakness  Genito-Urinary: Negative for: dysuria, frequency  Psychiatric: Negative for:  depression, anxiety  Hematology/Lymphatics: Negative for: bruising, lower extremity edema  Endocrine: Negative for: polyuria, polydypsia  Skin: Negative for: rash, blister, cellulitis,      PHYSICAL EXAM:     Vitals reviewed    General Appearance:  alert, well developed, in no acute distress  Head: Atraumatic  Eyes:  normal conjunctivae, sclera., normal sclera and normal pupils  Throat/Neck: normal sound to  voice. Normal hearing, normal speech  Respiratory:  Speaking in full sentences, non-labored. no increased work of breathing, no audible wheezing    Neuro: motor grossly intact, moving all extremities without difficulty  Psychiatric:  oriented to time, self, and place  Extremities:   Bilateral barefoot skin diabetic exam is normal, visualized feet and the appearance is normal.  Bilateral monofilament/sensation of both feet is normal.  Pulsation pedal pulse exam of both lower legs/feet is normal as well.      DATA:     BG reviewed.     a1c is 7.0 % ( 2/2024)        ASSESSMENT AND PLAN:                1. Type 2 DM:      a). Medications:      -c/w Metformin 1000 mg BID  Take with food  GI SE reviewed      - c/w glimepiride  8 mg daily  She has been counseled regarding r/o hypoglycemia.     - mounjaro 7.5 mg weekly  SE and CI dicussed  No personal or family history of MEN syndrome  Patient counselled regarding side effects including injection site reactions, nausea, vomiting, diarrhea, pancreatitis, gastroparesis and rare side effect colten Anthony syndrome.    - c/w farxiga 10 mg daily  No side effects including UTI or fungal infections.     Check and call with sugars in two weeks    b). No nephropathy  c). Up to date with eye exam.  d). Foot exam: Daily feet exam explained  e). BG log maintainence explained in great detail, to get log and glucometer on next visit.  f). Life style changes discussed  g). Hypoglycemia recognition and management discussed    2. Dyslipidemia  LDL is normal  A) Discussed lifestyle modifications including reductions in dietary total and saturated fat, weight loss, aerobic exercise, and eating a diet rich in fruits and vegetables.  B) Statin therapy: continue with atorvastatin 40 mg daily.  Discussed SE.             RTC in 4 -5 months    Call with BG as dicussed.       Orders Placed This Encounter   Procedures    POC HemoCue Glucose 201 (Finger stick glucose)    POC Glycohemoglobin [62493]

## 2024-02-27 ENCOUNTER — TELEPHONE (OUTPATIENT)
Dept: ENDOCRINOLOGY CLINIC | Facility: CLINIC | Age: 39
End: 2024-02-27

## 2024-02-27 NOTE — TELEPHONE ENCOUNTER
Current Outpatient Medications   Medication Sig Dispense Refill    Tirzepatide (MOUNJARO) 7.5 MG/0.5ML Subcutaneous Solution Pen-injector Inject 7.5 mg into the skin once a week. 6 mL 0     On backorder-pl send alternate

## 2024-02-29 ENCOUNTER — PATIENT MESSAGE (OUTPATIENT)
Dept: ENDOCRINOLOGY CLINIC | Facility: CLINIC | Age: 39
End: 2024-02-29

## 2024-03-01 RX ORDER — TIRZEPATIDE 5 MG/.5ML
5 INJECTION, SOLUTION SUBCUTANEOUS
Qty: 2 ML | Refills: 0 | Status: SHIPPED | OUTPATIENT
Start: 2024-03-01

## 2024-03-01 NOTE — TELEPHONE ENCOUNTER
From: Anjana Raymundo  To: Damaris Balderas  Sent: 2/29/2024 4:44 PM CST  Subject: Mounjaro 7.5 mg    Hi Dr. Balderas,    [facility] 7.5 [facility] of mounjaro right now. I will need my refill by next Wednesday, and my pharmacist suggested getting the 5mg for now. They said they reached out to you, but never got a response.     Anjana

## 2024-03-02 DIAGNOSIS — F41.9 ANXIETY: ICD-10-CM

## 2024-03-02 NOTE — TELEPHONE ENCOUNTER
LOV: 2/20/24    RTC:  4-5 Months    FU: 7/23/24    Last Refill: 12/18/23    6 Month Supply Pending

## 2024-03-05 RX ORDER — VENLAFAXINE HYDROCHLORIDE 75 MG/1
75 CAPSULE, EXTENDED RELEASE ORAL DAILY
Qty: 90 CAPSULE | Refills: 0 | Status: SHIPPED | OUTPATIENT
Start: 2024-03-05

## 2024-03-05 NOTE — TELEPHONE ENCOUNTER
Refill request is for a maintenance medication and has met the criteria specified in the Ambulatory Medication Refill Standing Order for eligibility, visits, laboratory, alerts and was sent to the requested pharmacy.    Requested Prescriptions     Signed Prescriptions Disp Refills    venlafaxine ER 75 MG Oral Capsule SR 24 Hr 90 capsule 0     Sig: TAKE 1 CAPSULE BY MOUTH EVERY DAY     Authorizing Provider: GERRI WADE     Ordering User: MARCELA EDMOND

## 2024-03-10 DIAGNOSIS — E11.9 TYPE 2 DIABETES MELLITUS WITHOUT COMPLICATION, WITHOUT LONG-TERM CURRENT USE OF INSULIN (HCC): ICD-10-CM

## 2024-03-11 RX ORDER — DAPAGLIFLOZIN 10 MG/1
10 TABLET, FILM COATED ORAL DAILY
Qty: 90 TABLET | Refills: 0 | Status: SHIPPED | OUTPATIENT
Start: 2024-03-11

## 2024-03-11 RX ORDER — GLIMEPIRIDE 4 MG/1
8 TABLET ORAL
Qty: 180 TABLET | Refills: 0 | Status: SHIPPED | OUTPATIENT
Start: 2024-03-11

## 2024-03-28 ENCOUNTER — HOSPITAL ENCOUNTER (OUTPATIENT)
Dept: MAMMOGRAPHY | Facility: HOSPITAL | Age: 39
Discharge: HOME | End: 2024-03-28
Attending: INTERNAL MEDICINE
Payer: COMMERCIAL

## 2024-03-28 DIAGNOSIS — Z12.31 ENCOUNTER FOR SCREENING MAMMOGRAM FOR MALIGNANT NEOPLASM OF BREAST: ICD-10-CM

## 2024-03-28 PROCEDURE — 77063 BREAST TOMOSYNTHESIS BI: CPT | Performed by: INTERNAL MEDICINE

## 2024-03-28 PROCEDURE — 77067 SCR MAMMO BI INCL CAD: CPT | Performed by: INTERNAL MEDICINE

## 2024-04-02 NOTE — TELEPHONE ENCOUNTER
LOV: 5/30/23    RTC: 4 Months    FU: 9/16/23    3 Month Supply Pending
Review of prior medical records, evaluation, management, communication with Podiatry and coordination with provider team.

## 2024-04-05 RX ORDER — LISINOPRIL 2.5 MG/1
2.5 TABLET ORAL DAILY
Qty: 90 TABLET | Refills: 0 | Status: SHIPPED | OUTPATIENT
Start: 2024-04-05

## 2024-04-05 NOTE — TELEPHONE ENCOUNTER
Refill request is for a maintenance medication and has met the criteria specified in the Ambulatory Medication Refill Standing Order for eligibility, visits, laboratory, alerts and was sent to the requested pharmacy.    Requested Prescriptions     Signed Prescriptions Disp Refills    lisinopril 2.5 MG Oral Tab 90 tablet 0     Sig: TAKE 1 TABLET BY MOUTH EVERY DAY     Authorizing Provider: GERRI WADE     Ordering User: JUDE KIMBROUGH

## 2024-04-16 ENCOUNTER — PATIENT MESSAGE (OUTPATIENT)
Dept: INTERNAL MEDICINE CLINIC | Facility: CLINIC | Age: 39
End: 2024-04-16

## 2024-04-17 NOTE — TELEPHONE ENCOUNTER
From: Anjana Raymundo  To: Gisel Swift  Sent: 4/16/2024 11:35 AM CDT  Subject: Food poisoning     Hi Dr. Swift    I seemed to have gotten food poisoning again. I can't keep anything down like water and ginger ale.     Any suggestions? It just started today.     Ally

## 2024-04-22 RX ORDER — TIRZEPATIDE 5 MG/.5ML
5 INJECTION, SOLUTION SUBCUTANEOUS WEEKLY
Qty: 6 ML | Refills: 0 | Status: SHIPPED | OUTPATIENT
Start: 2024-04-22

## 2024-05-05 LAB
% SATURATION: 25 % (CALC) (ref 16–45)
ABSOLUTE BASOPHILS: 33 CELLS/UL (ref 0–200)
ABSOLUTE EOSINOPHILS: 92 CELLS/UL (ref 15–500)
ABSOLUTE LYMPHOCYTES: 1822 CELLS/UL (ref 850–3900)
ABSOLUTE MONOCYTES: 370 CELLS/UL (ref 200–950)
ABSOLUTE NEUTROPHILS: 4283 CELLS/UL (ref 1500–7800)
ALBUMIN/GLOBULIN RATIO: 1.7 (CALC) (ref 1–2.5)
ALBUMIN: 3.9 G/DL (ref 3.6–5.1)
ALKALINE PHOSPHATASE: 67 U/L (ref 31–125)
ALT: 15 U/L (ref 6–29)
AST: 13 U/L (ref 10–30)
BASOPHILS: 0.5 %
BILIRUBIN, TOTAL: 0.4 MG/DL (ref 0.2–1.2)
BUN: 15 MG/DL (ref 7–25)
CALCIUM: 8.9 MG/DL (ref 8.6–10.2)
CARBON DIOXIDE: 26 MMOL/L (ref 20–32)
CHLORIDE: 105 MMOL/L (ref 98–110)
CHOL/HDLC RATIO: 2.6 (CALC)
CHOLESTEROL, TOTAL: 139 MG/DL
CREATININE, RANDOM URINE: 78 MG/DL (ref 20–275)
CREATININE: 0.56 MG/DL (ref 0.5–0.99)
EGFR: 118 ML/MIN/1.73M2
EOSINOPHILS: 1.4 %
FERRITIN: 10 NG/ML (ref 16–154)
GLOBULIN: 2.3 G/DL (CALC) (ref 1.9–3.7)
GLUCOSE: 126 MG/DL (ref 65–99)
HDL CHOLESTEROL: 54 MG/DL
HEMATOCRIT: 40.3 % (ref 35–45)
HEMOGLOBIN A1C: 7.4 % OF TOTAL HGB
HEMOGLOBIN: 12.9 G/DL (ref 11.7–15.5)
IRON BINDING CAPACITY: 331 MCG/DL (CALC) (ref 250–450)
IRON, TOTAL: 83 MCG/DL (ref 40–190)
LDL-CHOLESTEROL: 59 MG/DL (CALC)
LYMPHOCYTES: 27.6 %
MCH: 27.2 PG (ref 27–33)
MCHC: 32 G/DL (ref 32–36)
MCV: 84.8 FL (ref 80–100)
MICROALBUMIN/CREATININE RATIO, RANDOM URINE: 6 MG/G CREAT
MICROALBUMIN: 0.5 MG/DL
MONOCYTES: 5.6 %
MPV: 9.9 FL (ref 7.5–12.5)
NEUTROPHILS: 64.9 %
NON-HDL CHOLESTEROL: 85 MG/DL (CALC)
PLATELET COUNT: 266 THOUSAND/UL (ref 140–400)
POTASSIUM: 4.3 MMOL/L (ref 3.5–5.3)
PROTEIN, TOTAL: 6.2 G/DL (ref 6.1–8.1)
RDW: 13.5 % (ref 11–15)
RED BLOOD CELL COUNT: 4.75 MILLION/UL (ref 3.8–5.1)
SODIUM: 138 MMOL/L (ref 135–146)
TRIGLYCERIDES: 180 MG/DL
TSH W/REFLEX TO FT4: 2.81 MIU/L
VITAMIN B12: 356 PG/ML (ref 200–1100)
WHITE BLOOD CELL COUNT: 6.6 THOUSAND/UL (ref 3.8–10.8)

## 2024-05-20 ENCOUNTER — OFFICE VISIT (OUTPATIENT)
Dept: INTERNAL MEDICINE CLINIC | Facility: CLINIC | Age: 39
End: 2024-05-20

## 2024-05-20 ENCOUNTER — TELEPHONE (OUTPATIENT)
Dept: INTERNAL MEDICINE CLINIC | Facility: CLINIC | Age: 39
End: 2024-05-20

## 2024-05-20 VITALS
WEIGHT: 247 LBS | OXYGEN SATURATION: 98 % | HEIGHT: 64 IN | DIASTOLIC BLOOD PRESSURE: 72 MMHG | HEART RATE: 98 BPM | BODY MASS INDEX: 42.17 KG/M2 | TEMPERATURE: 98 F | SYSTOLIC BLOOD PRESSURE: 110 MMHG

## 2024-05-20 DIAGNOSIS — Z12.31 ENCOUNTER FOR SCREENING MAMMOGRAM FOR MALIGNANT NEOPLASM OF BREAST: ICD-10-CM

## 2024-05-20 DIAGNOSIS — I10 ESSENTIAL HYPERTENSION: ICD-10-CM

## 2024-05-20 DIAGNOSIS — E78.5 DYSLIPIDEMIA: ICD-10-CM

## 2024-05-20 DIAGNOSIS — Z00.00 ANNUAL PHYSICAL EXAM: Primary | ICD-10-CM

## 2024-05-20 DIAGNOSIS — E66.01 MORBID OBESITY WITH BMI OF 45.0-49.9, ADULT (HCC): ICD-10-CM

## 2024-05-20 DIAGNOSIS — J01.00 ACUTE NON-RECURRENT MAXILLARY SINUSITIS: ICD-10-CM

## 2024-05-20 DIAGNOSIS — E11.9 TYPE 2 DIABETES MELLITUS WITHOUT COMPLICATION, WITHOUT LONG-TERM CURRENT USE OF INSULIN (HCC): ICD-10-CM

## 2024-05-20 PROCEDURE — 99396 PREV VISIT EST AGE 40-64: CPT | Performed by: INTERNAL MEDICINE

## 2024-05-20 RX ORDER — TIRZEPATIDE 7.5 MG/.5ML
7.5 INJECTION, SOLUTION SUBCUTANEOUS WEEKLY
COMMUNITY
Start: 2024-05-06

## 2024-05-20 RX ORDER — AMOXICILLIN AND CLAVULANATE POTASSIUM 875; 125 MG/1; MG/1
1 TABLET, FILM COATED ORAL 2 TIMES DAILY
Qty: 20 TABLET | Refills: 0 | Status: SHIPPED | OUTPATIENT
Start: 2024-05-20 | End: 2024-05-30

## 2024-05-20 NOTE — TELEPHONE ENCOUNTER
Physician Result form completed and signed by Dr. Swift.    Faxed to:  [phone]    Fax confirmation received.    Form sent to scanning.    Copy placed in weekly hold bin.

## 2024-05-30 DIAGNOSIS — F41.9 ANXIETY: ICD-10-CM

## 2024-05-30 DIAGNOSIS — E11.9 TYPE 2 DIABETES MELLITUS WITHOUT COMPLICATION, WITHOUT LONG-TERM CURRENT USE OF INSULIN (HCC): ICD-10-CM

## 2024-05-30 RX ORDER — ATORVASTATIN CALCIUM 40 MG/1
40 TABLET, FILM COATED ORAL NIGHTLY
Qty: 90 TABLET | Refills: 1 | Status: SHIPPED | OUTPATIENT
Start: 2024-05-30

## 2024-05-30 RX ORDER — VENLAFAXINE HYDROCHLORIDE 75 MG/1
75 CAPSULE, EXTENDED RELEASE ORAL DAILY
Qty: 90 CAPSULE | Refills: 3 | Status: SHIPPED | OUTPATIENT
Start: 2024-05-30

## 2024-05-30 RX ORDER — DAPAGLIFLOZIN 10 MG/1
10 TABLET, FILM COATED ORAL DAILY
Qty: 90 TABLET | Refills: 0 | Status: SHIPPED | OUTPATIENT
Start: 2024-05-30

## 2024-05-30 RX ORDER — GLIMEPIRIDE 4 MG/1
8 TABLET ORAL
Qty: 180 TABLET | Refills: 0 | Status: SHIPPED | OUTPATIENT
Start: 2024-05-30

## 2024-05-30 NOTE — TELEPHONE ENCOUNTER
Refill request is for a maintenance medication and has met the criteria specified in the Ambulatory Medication Refill Standing Order for eligibility, visits, laboratory, alerts and was sent to the requested pharmacy.    Requested Prescriptions     Signed Prescriptions Disp Refills    venlafaxine ER 75 MG Oral Capsule SR 24 Hr 90 capsule 3     Sig: TAKE 1 CAPSULE BY MOUTH EVERY DAY     Authorizing Provider: GERRI WADE     Ordering User: MARCELA EDMOND

## 2024-06-17 NOTE — TELEPHONE ENCOUNTER
1.1 cm incidental nodule on PET/CT with uptake 12/29/23  Patient watched on CT 4/1/2027  Contemplating excision with his oncology team   Updated in pt's chart. Advised [facility]ills.

## 2024-06-22 ENCOUNTER — PATIENT MESSAGE (OUTPATIENT)
Dept: ENDOCRINOLOGY CLINIC | Facility: CLINIC | Age: 39
End: 2024-06-22

## 2024-06-24 RX ORDER — TIRZEPATIDE 5 MG/.5ML
5 INJECTION, SOLUTION SUBCUTANEOUS WEEKLY
Qty: 2 ML | Refills: 0 | Status: SHIPPED | OUTPATIENT
Start: 2024-06-24

## 2024-06-24 NOTE — TELEPHONE ENCOUNTER
From: Anjana Raymundo  To: Damaris Balderas  Sent: 6/22/2024 12:25 PM CDT  Subject: Mounjaro 7.5    Rachel Balderas     Since being on the mounjaro 7.5, I have noticed a few times, randomly, I would be throwing up. The first time, I thought that maybe I over ate and thought nothing of it. But it has now happened about 4 times in about 2 months. Just wondering if you have any suggestions? I have also experienced diaherra more frequently.     Anjana

## 2024-07-01 RX ORDER — LISINOPRIL 2.5 MG/1
2.5 TABLET ORAL DAILY
Qty: 90 TABLET | Refills: 3 | Status: SHIPPED | OUTPATIENT
Start: 2024-07-01

## 2024-07-01 NOTE — TELEPHONE ENCOUNTER
Refill request is for a maintenance medication and has met the criteria specified in the Ambulatory Medication Refill Standing Order for eligibility, visits, laboratory, alerts and was sent to the requested pharmacy.    Requested Prescriptions     Signed Prescriptions Disp Refills    lisinopril 2.5 MG Oral Tab 90 tablet 3     Sig: TAKE 1 TABLET BY MOUTH EVERY DAY     Authorizing Provider: GERRI WADE     Ordering User: LORIE RICHARDSON

## 2024-07-08 ENCOUNTER — PATIENT MESSAGE (OUTPATIENT)
Dept: INTERNAL MEDICINE CLINIC | Facility: CLINIC | Age: 39
End: 2024-07-08

## 2024-07-08 ENCOUNTER — TELEPHONE (OUTPATIENT)
Dept: INTERNAL MEDICINE CLINIC | Facility: CLINIC | Age: 39
End: 2024-07-08

## 2024-07-08 NOTE — TELEPHONE ENCOUNTER
From: Anjana Raymundo  To: Gisel Swift  Sent: 7/8/2024 10:08 AM CDT  Subject: Loren-call back    Levon Pimentel,      I missed your call, I'm at work. Can you please call me back after 330?    Anjana

## 2024-07-09 ENCOUNTER — TELEPHONE (OUTPATIENT)
Dept: INTERNAL MEDICINE CLINIC | Facility: CLINIC | Age: 39
End: 2024-07-09

## 2024-07-09 NOTE — TELEPHONE ENCOUNTER
Received new prescription for Amoxicillin from [facility] [address],    Placed in yellow folder

## 2024-07-10 DIAGNOSIS — E11.9 TYPE 2 DIABETES MELLITUS WITHOUT COMPLICATION, WITHOUT LONG-TERM CURRENT USE OF INSULIN (HCC): ICD-10-CM

## 2024-07-10 RX ORDER — BLOOD SUGAR DIAGNOSTIC
STRIP MISCELLANEOUS
Qty: 100 STRIP | Refills: 2 | Status: SHIPPED | OUTPATIENT
Start: 2024-07-10

## 2024-07-10 RX ORDER — TIRZEPATIDE 5 MG/.5ML
5 INJECTION, SOLUTION SUBCUTANEOUS WEEKLY
Qty: 6 ML | Refills: 0 | Status: SHIPPED | OUTPATIENT
Start: 2024-07-10

## 2024-07-10 RX ORDER — GLIMEPIRIDE 4 MG/1
8 TABLET ORAL
Qty: 180 TABLET | Refills: 1 | Status: SHIPPED | OUTPATIENT
Start: 2024-07-10

## 2024-07-10 RX ORDER — DAPAGLIFLOZIN 10 MG/1
10 TABLET, FILM COATED ORAL DAILY
Qty: 90 TABLET | Refills: 1 | Status: SHIPPED | OUTPATIENT
Start: 2024-07-10

## 2024-07-10 NOTE — TELEPHONE ENCOUNTER
Endocrine Refill protocol for oral and injectable diabetic medications    Protocol Criteria:pass    -Appointment with Endocrinology completed in the last 6 months or scheduled in the next 3 months    -A1c result below 8.5% in the past 6 months      Verify the above has been completed or scheduled in the appropriate timeline. If so can send a 90 day supply with 1 refill.     Last completed office visit: 2/20/2024 Damaris Balderas MD   Next scheduled Follow up:   Future Appointments   Date Time Provider Department Center   7/23/2024  5:30 PM Damaris Balderas MD [facility]      Last A1c result: Last A1c value was 7.4% done 5/4/2024.

## 2024-07-10 NOTE — TELEPHONE ENCOUNTER
New RX request  - Qty:90    Current Outpatient Medications   Medication Sig Dispense Refill           Tirzepatide (MOUNJARO) 5 MG/0.5ML Subcutaneous Solution Pen-injector Inject 5 mg into the skin once a week. 2 mL 0           GLIMEPIRIDE 4 MG Oral Tab TAKE 2 TABLETS (8 MG TOTAL) BY MOUTH BEFORE BREAKFAST. 180 tablet 0    FARXIGA 10 MG Oral Tab TAKE 1 TABLET BY MOUTH EVERY DAY 90 tablet 0                                              Glucose Blood (ONETOUCH VERIO) In Vitro Strip Use to check blood sugar daily. 100 strip 2

## 2024-07-22 ENCOUNTER — OFFICE VISIT (OUTPATIENT)
Dept: ENDOCRINOLOGY CLINIC | Facility: CLINIC | Age: 39
End: 2024-07-22
Payer: COMMERCIAL

## 2024-07-22 VITALS
DIASTOLIC BLOOD PRESSURE: 72 MMHG | HEART RATE: 87 BPM | SYSTOLIC BLOOD PRESSURE: 110 MMHG | HEIGHT: 64 IN | BODY MASS INDEX: 41.48 KG/M2 | WEIGHT: 243 LBS

## 2024-07-22 DIAGNOSIS — E11.9 TYPE 2 DIABETES MELLITUS WITHOUT COMPLICATION, WITHOUT LONG-TERM CURRENT USE OF INSULIN (HCC): Primary | ICD-10-CM

## 2024-07-22 DIAGNOSIS — E78.5 DYSLIPIDEMIA: ICD-10-CM

## 2024-07-22 LAB
GLUCOSE BLOOD: 147
HEMOGLOBIN A1C: 6.1 % (ref 4.3–5.6)
TEST STRIP LOT #: NORMAL NUMERIC

## 2024-07-22 PROCEDURE — 83036 HEMOGLOBIN GLYCOSYLATED A1C: CPT | Performed by: INTERNAL MEDICINE

## 2024-07-22 PROCEDURE — 82947 ASSAY GLUCOSE BLOOD QUANT: CPT | Performed by: INTERNAL MEDICINE

## 2024-07-22 PROCEDURE — 99213 OFFICE O/P EST LOW 20 MIN: CPT | Performed by: INTERNAL MEDICINE

## 2024-07-22 RX ORDER — TIRZEPATIDE 5 MG/.5ML
5 INJECTION, SOLUTION SUBCUTANEOUS WEEKLY
Qty: 6 ML | Refills: 1 | Status: SHIPPED | OUTPATIENT
Start: 2024-07-22

## 2024-07-22 NOTE — PROGRESS NOTES
Return Office Visit - Diabetes    CHIEF COMPLAINT:    DM   Dyslipidemia      HISTORY OF PRESENT ILLNESS:   Anjana Raymundo is a 39 year old female who presents for follow up for diabetes.        DM HISTORY  Diagnosed: at age 29    FH of DM: Parents have DM.      HISTORY OF DIABETES COMPLICATIONS: :  History of Retinopathy: No, summer 2023  History of Neuropathy: No   History of Nephropathy: No     ASSOCIATED COMPLICATIONS:    HTN: Yes  Hyperlipidemia: Yes  Coronary Artery Disease:  No   Cerebrovascular Disease: No       HOME GLUCOSE READINGS:     Not checking on a regular basis--> encouraged to start checking    CURRENT DIABETIC MEDICATIONS INCLUDE:    Glimepiride 8 mg daily, MTF 1000 mg BID, Farxiga 10 mg daily, mounjaro 5 mg once a week    MEALS:  Moderate compliance  States that mounjaro helps with appetite suppression, however, she has lately been feeling like her appetite is coming back     EXERCISE:  No regular exercise, but has been trying to stay active    She has regular cycles  She is not sexually active at this time  No plans to get pregnant in the future    CURRENT MEDICATIONS:    Current Outpatient Medications   Medication Sig Dispense Refill    Tirzepatide (MOUNJARO) 5 MG/0.5ML Subcutaneous Solution Pen-injector Inject 5 mg into the skin once a week. 6 mL 1    glimepiride 4 MG Oral Tab Take 2 tablets (8 mg total) by mouth before breakfast. 180 tablet 1    dapagliflozin (FARXIGA) 10 MG Oral Tab Take 1 tablet (10 mg total) by mouth daily. 90 tablet 1    Glucose Blood (ONETOUCH VERIO) In Vitro Strip Use to check blood sugar daily. 100 strip 2    lisinopril 2.5 MG Oral Tab TAKE 1 TABLET BY MOUTH EVERY DAY 90 tablet 3    venlafaxine ER 75 MG Oral Capsule SR 24 Hr TAKE 1 CAPSULE BY MOUTH EVERY DAY 90 capsule 3    ATORVASTATIN 40 MG Oral Tab TAKE 1 TABLET BY MOUTH EVERY DAY AT NIGHT 90 tablet 1    Multiple Vitamin (MULTIVITAMIN ADULT OR) Take 1 tablet by mouth daily.      metFORMIN HCl 1000 MG Oral Tab  TAKE 1 TABLET BY MOUTH TWICE A  tablet 1    buPROPion  MG Oral Tablet 24 Hr Take 1 tablet (150 mg total) by mouth daily. 90 tablet 3    ALPRAZolam (XANAX) 0.25 MG Oral Tab Take 1 tablet (0.25 mg total) by mouth nightly as needed. 30 tablet 0    omeprazole 20 MG Oral Capsule Delayed Release Take 1 capsule (20 mg total) by mouth daily. 90 capsule 3    ONETOUCH VERIO REFLECT w/Device Does not apply Kit USING TWICE DAILY 1 kit 0    Meloxicam 7.5 MG Oral Tab Take 1 tablet (7.5 mg total) by mouth daily. 30 tablet 1    diphenhydrAMINE HCl (ALLERGY MEDICINE OR) 1 tablet daily      BIOTIN 5000 OR Take 1 tablet by mouth daily.      aspirin 81 MG Oral Tab Take 1 tablet (81 mg total) by mouth daily.         PAST MEDICAL, SOCIAL AND FAMILY HISTORY:  See past medical history marked as reviewed.  See past surgical history marked as reviewed.  See past family history marked as reviewed.  See past social history marked as reviewed.    ASSESSMENTS:      REVIEW OF SYSTEMS:  Constitutional: Negative for:  fever, fatigue, cold/heat intolerance, weight loss intentional  Eyes: Negative for:  Visual changes, proptosis, blurring  ENT: Negative for:  dysphagia, neck swelling, dysphonia  Respiratory: Negative for:  dyspnea, cough  Cardiovascular: Negative for:  chest pain, palpitations, orthopnea  GI: Negative for:  abdominal pain, nausea, vomiting, diarrhea, constipation, bleeding  Neurology: Negative for: headache, numbness, weakness  Genito-Urinary: Negative for: dysuria, frequency  Psychiatric: Negative for:  depression, anxiety  Hematology/Lymphatics: Negative for: bruising, lower extremity edema  Endocrine: Negative for: polyuria, polydypsia  Skin: Negative for: rash, blister, cellulitis,      PHYSICAL EXAM:     Vitals reviewed    General Appearance:  alert, well developed, in no acute distress  Head: Atraumatic  Eyes:  normal conjunctivae, sclera., normal sclera and normal pupils  Throat/Neck: normal sound to voice. Normal  hearing, normal speech  Respiratory:  Speaking in full sentences, non-labored. no increased work of breathing, no audible wheezing    Neuro: motor grossly intact, moving all extremities without difficulty  Psychiatric:  oriented to time, self, and place  Extremities:   Bilateral barefoot skin diabetic exam is normal, visualized feet and the appearance is normal.  Bilateral monofilament/sensation of both feet is normal.  Pulsation pedal pulse exam of both lower legs/feet is normal as well.      DATA:     BG reviewed.     a1c is 6.1 % ( 7/2024)        ASSESSMENT AND PLAN:                1. Type 2 DM:      a). Medications:      -c/w Metformin 1000 mg BID  Take with food  GI SE reviewed      - c/w glimepiride  8 mg daily  She has been counseled regarding r/o hypoglycemia.     - mounjaro 5 mg weekly  SE and CI dicussed  No personal or family history of MEN syndrome  Patient counselled regarding side effects including injection site reactions, nausea, vomiting, diarrhea, pancreatitis, gastroparesis and rare side effect colten Anthony syndrome.    - c/w farxiga 10 mg daily  No side effects including UTI or fungal infections.     Check and call with sugars as discussed    b). No nephropathy  c). Up to date with eye exam.  d). Foot exam: Daily feet exam explained  e). BG log maintainence explained in great detail, to get log and glucometer on next visit.  f). Life style changes discussed  g). Hypoglycemia recognition and management discussed    2. Dyslipidemia  LDL is normal  A) Discussed lifestyle modifications including reductions in dietary total and saturated fat, weight loss, aerobic exercise, and eating a diet rich in fruits and vegetables.  B) Statin therapy: continue with atorvastatin 40 mg daily.  Discussed SE.             RTC in 4 -5 months    Call with BG as dicussed.   Labs have been ordered by PCP       Orders Placed This Encounter   Procedures    POC Glycohemoglobin [76338]    POC HemoCue Glucose 201 (Finger  stick glucose)

## 2024-08-11 ENCOUNTER — PATIENT MESSAGE (OUTPATIENT)
Dept: ENDOCRINOLOGY CLINIC | Facility: CLINIC | Age: 39
End: 2024-08-11

## 2024-08-12 NOTE — TELEPHONE ENCOUNTER
Hypoglycemia    Onset of hypoglycemia: A few weeks ago but noticed increase in instances this past week.     BG levels: Patient checking via fingerstick. Is currently at work so does not have several days of logs. Will request that she send sugars when available. Reports symptoms when she is in the 80s. This past Saturday night was snacking on trail mix and had low of 77 after the snack. She had a cookie and 15 minutes later she was at 88. A few weeks ago reports sugar of 88 while making dinner and she was having some shaking.      Pattern of hypoglycemia: No reported trends. However reports symptoms when she is in the 80s.     Symptoms: States she starts shaking.     Acute illness: none    Hypoglycemia Mx/Rule of 15: She reports following the rule of 15s.    Change in Diet:  Regarding diet patient states: \"Since I went back to the 5mg of monjaro from the 7.5mg, I have gotten my appetite back, but I have been definitely eating less with better portion sizes. However, I find on days I work from home, I tend to not be as hungry. 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 \"    List Medications/Compliance: Patient confirms taking:         - Metformin 1000 mg two times per day with meals.            - Mounjaro 5 mg weekly           - Farxiga 10 mg daily           - glimepiride 8 mg daily

## 2024-08-12 NOTE — TELEPHONE ENCOUNTER
From: Anjana Raymundo  To: Damaris Balderas  Sent: 8/11/2024 3:00 PM CDT  Subject: Low sugars    Hi Dr. Balderas,     I have a few times this week experienced lows, but mostly at night before bed. I was last night, after eating some trail mix, and just now I was at the grocery store and started to get low, but that was midafternoon. Please let me know next steps.     Anjana

## 2024-08-12 NOTE — TELEPHONE ENCOUNTER
Called the patient to triage. She cannot talk right now, she is at work. She requested questions be sent to [facility], [place] and she will answer then later today.

## 2024-08-12 NOTE — TELEPHONE ENCOUNTER
Please decrease amaryl ( glimepiride ) to 4 mg daily   Thanks  Call if Bg are still under 80 or pre meal BG persistently over 180

## 2024-09-17 RX ORDER — TIRZEPATIDE 5 MG/.5ML
5 INJECTION, SOLUTION SUBCUTANEOUS WEEKLY
Qty: 6 ML | Refills: 1 | Status: SHIPPED | OUTPATIENT
Start: 2024-09-17

## 2024-09-17 NOTE — TELEPHONE ENCOUNTER
Endocrine Refill protocol for metformin    Protocol Criteria:  PASSED  Reason: N/A    -Appointment with Endocrinology completed in the last 6 months or scheduled in the next 3 months    -GFR greater than or equal to 40 in the past 12 months     -A1c result below 8.5% in the past 6 months      Verify the above has been completed or scheduled in the appropriate timeline. If so can send a 90 day supply with 1 refill.     Last completed office visit:7/22/2024 Damaris Balderas MD   Next scheduled Follow up:   Future Appointments   Date Time Provider Department Center   12/17/2024  4:45 PM Damaris Balderas MD [facility]       Last GFR result:    Lab Results   Component Value Date    EGFRCR 118 05/04/2024     Last A1c result: Last A1C result: 6.1% done 7/22/2024.

## 2024-09-21 DIAGNOSIS — F41.9 ANXIETY: ICD-10-CM

## 2024-09-21 RX ORDER — BUPROPION HYDROCHLORIDE 150 MG/1
150 TABLET ORAL DAILY
Qty: 90 TABLET | Refills: 3 | Status: CANCELLED | OUTPATIENT
Start: 2024-09-21

## 2024-09-24 ENCOUNTER — PATIENT MESSAGE (OUTPATIENT)
Dept: INTERNAL MEDICINE CLINIC | Facility: CLINIC | Age: 39
End: 2024-09-24

## 2024-09-24 DIAGNOSIS — F41.9 ANXIETY: ICD-10-CM

## 2024-09-24 RX ORDER — BUPROPION HYDROCHLORIDE 150 MG/1
150 TABLET ORAL DAILY
Qty: 90 TABLET | Refills: 3 | Status: SHIPPED | OUTPATIENT
Start: 2024-09-24

## 2024-09-24 NOTE — TELEPHONE ENCOUNTER
From: Anjana Raymundo  To: Gisel Swift  Sent: 9/24/2024 10:55 AM CDT  Subject: Medicine refill     Hi,     I know it's still in the time frame, but I requested a refill on Sunday and I haven't gotten a responses yet. I'm just looking for an update     Ally

## 2024-09-24 NOTE — TELEPHONE ENCOUNTER
Refill request is for a maintenance medication and has met the criteria specified in the Ambulatory Medication Refill Standing Order for eligibility, visits, laboratory, alerts and was sent to the requested pharmacy.    Requested Prescriptions     Signed Prescriptions Disp Refills    buPROPion  MG Oral Tablet 24 Hr 90 tablet 3     Sig: Take 1 tablet (150 mg total) by mouth daily.     Authorizing Provider: GERRI WADE     Ordering User: MARY GARNICA

## 2024-10-16 DIAGNOSIS — E78.5 DYSLIPIDEMIA: ICD-10-CM

## 2024-10-16 DIAGNOSIS — E11.9 TYPE 2 DIABETES MELLITUS WITHOUT COMPLICATION, WITHOUT LONG-TERM CURRENT USE OF INSULIN (HCC): Primary | ICD-10-CM

## 2024-10-17 RX ORDER — ATORVASTATIN CALCIUM 40 MG/1
40 TABLET, FILM COATED ORAL NIGHTLY
Qty: 90 TABLET | Refills: 1 | Status: SHIPPED | OUTPATIENT
Start: 2024-10-17

## 2024-10-17 NOTE — TELEPHONE ENCOUNTER
Endocrine refill protocol for lipid lowering medications    Protocol Criteria:  PASSED Reason: N/A    If all below requirements are met, send a 90-day supply with 1 refill per provider protocol.    Verify appointment with Endocrinology completed in the last 6 months or scheduled in the next 3 months.  Lipid panel must have been completed in the last 12 months   ALT result below 80  LDL result below 130    Last completed office visit:7/22/2024 Damaris Balderas MD   Next scheduled Follow up:   Future Appointments   Date Time Provider Department Center   12/17/2024  4:45 PM Damaris Balderas MD [facility]      Last Lipid panel date: Cholesterol: 139, done on 5/4/2024.  HDL Cholesterol: 54, done on 5/4/2024.  TriGlycerides 180, done on 5/4/2024.  LDL Cholesterol: 59, done on 5/4/2024.     Last ALT result: Last ALT was 15 done on 5/4/2024.  Last AST was 13 done on 5/4/2024.

## 2024-11-03 ENCOUNTER — APPOINTMENT (OUTPATIENT)
Dept: GENERAL RADIOLOGY | Facility: HOSPITAL | Age: 39
End: 2024-11-03
Attending: EMERGENCY MEDICINE
Payer: COMMERCIAL

## 2024-11-03 ENCOUNTER — APPOINTMENT (OUTPATIENT)
Dept: CT IMAGING | Facility: HOSPITAL | Age: 39
End: 2024-11-03
Attending: EMERGENCY MEDICINE
Payer: COMMERCIAL

## 2024-11-03 ENCOUNTER — HOSPITAL ENCOUNTER (EMERGENCY)
Facility: HOSPITAL | Age: 39
Discharge: HOME | End: 2024-11-03
Attending: EMERGENCY MEDICINE
Payer: COMMERCIAL

## 2024-11-03 VITALS
DIASTOLIC BLOOD PRESSURE: 85 MMHG | HEART RATE: 79 BPM | SYSTOLIC BLOOD PRESSURE: 126 MMHG | OXYGEN SATURATION: 100 % | RESPIRATION RATE: 20 BRPM | TEMPERATURE: 98 F | WEIGHT: 240 LBS | BODY MASS INDEX: 41 KG/M2

## 2024-11-03 DIAGNOSIS — R11.10 VOMITING, UNSPECIFIED VOMITING TYPE, UNSPECIFIED WHETHER NAUSEA PRESENT: Primary | ICD-10-CM

## 2024-11-03 LAB
ALBUMIN SERPL-MCNC: 4.9 G/DL (ref 3.2–4.8)
ALBUMIN/GLOB SERPL: 1.8 {RATIO} (ref 1–2)
ALP LIVER SERPL-CCNC: 78 U/L
ALT SERPL-CCNC: 20 U/L
ANION GAP SERPL CALC-SCNC: 15 MMOL/L (ref 0–18)
AST SERPL-CCNC: 19 U/L (ref ?–34)
ATRIAL RATE: 80 BPM
B-HCG SERPL-ACNC: <2.6 MIU/ML
BASOPHILS # BLD AUTO: 0.05 X10(3) UL (ref 0–0.2)
BASOPHILS NFR BLD AUTO: 0.4 %
BILIRUB SERPL-MCNC: 0.6 MG/DL (ref 0.3–1.2)
BILIRUB UR QL: NEGATIVE
BUN BLD-MCNC: 12 MG/DL (ref 9–23)
BUN/CREAT SERPL: 14.6 (ref 10–20)
CALCIUM BLD-MCNC: 10.4 MG/DL (ref 8.7–10.4)
CHLORIDE SERPL-SCNC: 104 MMOL/L (ref 98–112)
CLARITY UR: CLEAR
CO2 SERPL-SCNC: 21 MMOL/L (ref 21–32)
CREAT BLD-MCNC: 0.82 MG/DL
D DIMER PPP FEU-MCNC: 0.3 UG/ML FEU (ref ?–0.5)
DEPRECATED RDW RBC AUTO: 38.7 FL (ref 35.1–46.3)
EGFRCR SERPLBLD CKD-EPI 2021: 93 ML/MIN/1.73M2 (ref 60–?)
EOSINOPHIL # BLD AUTO: 0.01 X10(3) UL (ref 0–0.7)
EOSINOPHIL NFR BLD AUTO: 0.1 %
ERYTHROCYTE [DISTWIDTH] IN BLOOD BY AUTOMATED COUNT: 12.8 % (ref 11–15)
GLOBULIN PLAS-MCNC: 2.8 G/DL (ref 2–3.5)
GLUCOSE BLD-MCNC: 243 MG/DL (ref 70–99)
GLUCOSE BLDC GLUCOMTR-MCNC: 242 MG/DL (ref 70–99)
GLUCOSE UR-MCNC: >1000 MG/DL
HCT VFR BLD AUTO: 44.2 %
HGB BLD-MCNC: 15.1 G/DL
IMM GRANULOCYTES # BLD AUTO: 0.05 X10(3) UL (ref 0–1)
IMM GRANULOCYTES NFR BLD: 0.4 %
INR BLD: 0.83 (ref 0.8–1.2)
KETONES UR-MCNC: >150 MG/DL
LEUKOCYTE ESTERASE UR QL STRIP.AUTO: NEGATIVE
LIPASE SERPL-CCNC: 46 U/L (ref 12–53)
LYMPHOCYTES # BLD AUTO: 1.24 X10(3) UL (ref 1–4)
LYMPHOCYTES NFR BLD AUTO: 11.1 %
MCH RBC QN AUTO: 28.5 PG (ref 26–34)
MCHC RBC AUTO-ENTMCNC: 34.2 G/DL (ref 31–37)
MCV RBC AUTO: 83.6 FL
MONOCYTES # BLD AUTO: 0.37 X10(3) UL (ref 0.1–1)
MONOCYTES NFR BLD AUTO: 3.3 %
NEUTROPHILS # BLD AUTO: 9.43 X10 (3) UL (ref 1.5–7.7)
NEUTROPHILS # BLD AUTO: 9.43 X10(3) UL (ref 1.5–7.7)
NEUTROPHILS NFR BLD AUTO: 84.7 %
NITRITE UR QL STRIP.AUTO: NEGATIVE
OSMOLALITY SERPL CALC.SUM OF ELEC: 298 MOSM/KG (ref 275–295)
P AXIS: 69 DEGREES
P-R INTERVAL: 138 MS
PH UR: 6 [PH] (ref 5–8)
PLATELET # BLD AUTO: 311 10(3)UL (ref 150–450)
POTASSIUM SERPL-SCNC: 5 MMOL/L (ref 3.5–5.1)
PROT SERPL-MCNC: 7.7 G/DL (ref 5.7–8.2)
PROTHROMBIN TIME: 11.9 SECONDS (ref 11.6–14.8)
Q-T INTERVAL: 402 MS
QRS DURATION: 78 MS
QTC CALCULATION (BEZET): 463 MS
R AXIS: 56 DEGREES
RBC # BLD AUTO: 5.29 X10(6)UL
RBC #/AREA URNS AUTO: >10 /HPF
SODIUM SERPL-SCNC: 140 MMOL/L (ref 136–145)
SP GR UR STRIP: >1.03 (ref 1–1.03)
T AXIS: 41 DEGREES
TROPONIN I SERPL HS-MCNC: 7 NG/L
UROBILINOGEN UR STRIP-ACNC: NORMAL
VENTRICULAR RATE: 80 BPM
WBC # BLD AUTO: 11.2 X10(3) UL (ref 4–11)

## 2024-11-03 PROCEDURE — 99285 EMERGENCY DEPT VISIT HI MDM: CPT

## 2024-11-03 PROCEDURE — 85025 COMPLETE CBC W/AUTO DIFF WBC: CPT

## 2024-11-03 PROCEDURE — 96375 TX/PRO/DX INJ NEW DRUG ADDON: CPT

## 2024-11-03 PROCEDURE — 74177 CT ABD & PELVIS W/CONTRAST: CPT | Performed by: EMERGENCY MEDICINE

## 2024-11-03 PROCEDURE — 96374 THER/PROPH/DIAG INJ IV PUSH: CPT

## 2024-11-03 PROCEDURE — 84702 CHORIONIC GONADOTROPIN TEST: CPT | Performed by: EMERGENCY MEDICINE

## 2024-11-03 PROCEDURE — S0028 INJECTION, FAMOTIDINE, 20 MG: HCPCS | Performed by: EMERGENCY MEDICINE

## 2024-11-03 PROCEDURE — 83690 ASSAY OF LIPASE: CPT

## 2024-11-03 PROCEDURE — 80053 COMPREHEN METABOLIC PANEL: CPT | Performed by: EMERGENCY MEDICINE

## 2024-11-03 PROCEDURE — 85379 FIBRIN DEGRADATION QUANT: CPT | Performed by: EMERGENCY MEDICINE

## 2024-11-03 PROCEDURE — 85610 PROTHROMBIN TIME: CPT | Performed by: EMERGENCY MEDICINE

## 2024-11-03 PROCEDURE — 96361 HYDRATE IV INFUSION ADD-ON: CPT

## 2024-11-03 PROCEDURE — 96376 TX/PRO/DX INJ SAME DRUG ADON: CPT

## 2024-11-03 PROCEDURE — 82962 GLUCOSE BLOOD TEST: CPT

## 2024-11-03 PROCEDURE — 83690 ASSAY OF LIPASE: CPT | Performed by: EMERGENCY MEDICINE

## 2024-11-03 PROCEDURE — 84484 ASSAY OF TROPONIN QUANT: CPT | Performed by: EMERGENCY MEDICINE

## 2024-11-03 PROCEDURE — 81001 URINALYSIS AUTO W/SCOPE: CPT | Performed by: EMERGENCY MEDICINE

## 2024-11-03 PROCEDURE — 93010 ELECTROCARDIOGRAM REPORT: CPT

## 2024-11-03 PROCEDURE — 93005 ELECTROCARDIOGRAM TRACING: CPT

## 2024-11-03 PROCEDURE — 71046 X-RAY EXAM CHEST 2 VIEWS: CPT | Performed by: EMERGENCY MEDICINE

## 2024-11-03 PROCEDURE — 85025 COMPLETE CBC W/AUTO DIFF WBC: CPT | Performed by: EMERGENCY MEDICINE

## 2024-11-03 PROCEDURE — 80053 COMPREHEN METABOLIC PANEL: CPT

## 2024-11-03 RX ORDER — ONDANSETRON 2 MG/ML
4 INJECTION INTRAMUSCULAR; INTRAVENOUS ONCE
Status: COMPLETED | OUTPATIENT
Start: 2024-11-03 | End: 2024-11-03

## 2024-11-03 RX ORDER — PANTOPRAZOLE SODIUM 40 MG/1
TABLET, DELAYED RELEASE ORAL
Qty: 30 TABLET | Refills: 0 | Status: SHIPPED | OUTPATIENT
Start: 2024-11-03

## 2024-11-03 RX ORDER — FAMOTIDINE 10 MG/ML
20 INJECTION, SOLUTION INTRAVENOUS ONCE
Status: COMPLETED | OUTPATIENT
Start: 2024-11-03 | End: 2024-11-03

## 2024-11-03 NOTE — ED QUICK NOTES
Pt is resting in bed with lights low. She acknowledges that we need a urine sample. Her demeanor is calm.

## 2024-11-03 NOTE — ED INITIAL ASSESSMENT (HPI)
Patient arrives to ER for evaluation of Difficulty breathing. Patient woke up vomiting and then stated he started to hurt when she was breathing.     Patient tearful in triage saying the pain is making her feel anxious.

## 2024-11-03 NOTE — DISCHARGE INSTRUCTIONS
Return to emergency department for any worsening symptoms including but not limited to worsening chest pain/abdominal, shortness of breath, worsening symptoms with exertion, lower extremity swelling, weakness, numbness, abdominal pain, etc. Please follow with your primary care provider in the next few days for reevaluation of your symptoms.  The Emergency Department is not intended to be a substitute for an effort to provide complete medical care. The imaging, if any, have often been interpreted on a preliminary basis pending final reading by the radiologist. If your blood pressure was greater than 140/90, please have this blood pressure rechecked by your primary care provider in the next several days.

## 2024-11-03 NOTE — ED QUICK NOTES
Informed pt will obtain labs while waiting for a room. Pt is hyperventilating and tearful. Informed pt she will be ok and we will get her feeling better. Pt states, \"Don't say those things to me!\". Asked pt if she can take slow deep breaths which will help her anxiety. Pt states, \"I can't I am so cold\". PIV established and pt medicated. Warm blanket provided for pt. Pt yells at this RN \"This is not what I asked for!! I asked for my boyfriend!\". Pt's boyfriend with pt..

## 2024-11-03 NOTE — ED PROVIDER NOTES
Patient Seen in: [facility]         EMERGENCY DEPARTMENT NOTE    Dictated. Voice Transcription software has been utilized for this dictation (the reader should be aware that typographical errors are possible with voice transcription software and to please contact the dictating physician if there are questions.)         History     Chief Complaint   Patient presents with    Nausea/Vomiting/Diarrhea    Difficulty Breathing       There may be discrepancies from triage note.     HPI    History provided by patient and patient's boyfriend at bedside.  39-year-old female, history as mentioned below, history of reflux, oral dependent diabetes complaining of nonbloody nonbilious vomiting that started overnight.  States that she started developing mid chest pain around 530 with mild shortness of breath.  No measured temperatures however reports chills.  Reports having 2 sips of alcohol yesterday.  States that she typically does not  No sick contacts, recent travel.  No fevers, chills, nausea,  diarrhea, constipation, cough, cold symptoms, urinary complaints.  No chest pain, shortness of breath  No headache, neck pain, neck stiffness, incontinence.  No changes in mentation, no changes in vision, no total/new extremity weakness, no total/new extremity paresthesia, no difficulty speaking.  No alleviating or exacerbating factors.  Denies orthopnea, pnd, change in exercise tolerance limited by chest pain/sob , lower extremity edema/asymmetry.   Patient denies abdominal pain.  boyfriend at bedside states that patient and him had [employer] for lunch.  Patient had salsa and chips with a friend last night for dinner    \" I do not know if this is my anxiety.\"    History reviewed.   Past Medical History:    Allergic rhinitis    Anesthesia complication    sedation didnt work. remembered entire procedure.    Anxiety    It just happens from time to time    Asthma (HCC)    in high school    Esophageal reflux    Learning disability     Obesity    Reflux    Type II or unspecified type diabetes mellitus without mention of complication, not stated as uncontrolled    Wears glasses       History reviewed.   Past Surgical History:   Procedure Laterality Date    Ankle scope,extens debridemnt Right 2014    Procedure: ARTHROSCOPY ANKLE WITH DEBRIDEMENT;  Surgeon: Noah Morgan MD;  Location: [facility]    Gastrocnemius recession Right 2014    Procedure: GASTROC RECESSION FOOT;  Surgeon: Noah Morgan MD;  Location: [facility]    Hc implant ear tubes      Molecular extraction ([id number])      wisdom teeth.    Other surgical history      wisdom teeth removed    Other surgical history      bilateral tubes in ears    Other surgical history      R annkle surgery         Medications :  Prescriptions Prior to Admission[1]     Family History   Problem Relation Age of Onset    Diabetes Father 40    Hypertension Father     Diabetes Mother 40    Seizure Disorder Brother     Cancer Maternal Grandmother         unknown    Cancer Paternal Uncle         lung, smoker       Smoking Status:   Social History     Socioeconomic History    Marital status: Life Partner   Tobacco Use    Smoking status: Former     Current packs/day: 0.00     Types: Cigarettes     Quit date: 3/25/2002     Years since quittin.6    Smokeless tobacco: Never    Tobacco comments:     0.5 pack q 2 wks   Vaping Use    Vaping status: Never Used   Substance and Sexual Activity    Alcohol use: Yes     Comment: Maybe 2 times a month    Drug use: Yes     Frequency: 7.0 times per week     Types: Cannabis   Other Topics Concern    Caffeine Concern Yes     Comment: Coffee - 1 cup per day        Review of Systems   Constitutional:  Positive for chills.   HENT: Negative.     Eyes: Negative.    Respiratory:  Positive for shortness of breath. Negative for cough.    Cardiovascular:  Positive for chest pain.   Gastrointestinal:  Positive for vomiting. Negative for abdominal pain.    Genitourinary: Negative.    Musculoskeletal: Negative.    Skin: Negative.    Neurological: Negative.    Endo/Heme/Allergies: Negative.    Psychiatric/Behavioral: Negative.     All other systems reviewed and are negative.    Pertinent positives as listed.  All other organ systems are reviewed and are negative.    Constitutional and vital signs reviewed.      Social History and Family History elements reviewed from today, pertinent positives to the presenting problem noted.    Physical Exam     ED Triage Vitals [11/03/24 0748]   /85   Pulse 79   Resp 20   Temp 97.8 °F (36.6 °C)   Temp src Temporal   SpO2 100 %   O2 Device None (Room air)       All measures to prevent infection transmission during my interaction with the patient were taken. The patient was already wearing a droplet mask on my arrival to the room. Personal protective equipment including droplet mask, eye protection, and gloves were worn throughout the duration of the exam.  Handwashing was performed prior to and after the exam.  Stethoscope and any equipment used during my examination was cleaned with super sani-cloth germicidal wipes following the exam.     Physical Exam  Vitals and nursing note reviewed.   Constitutional:       General: She is not in acute distress.     Appearance: She is obese. She is not ill-appearing or toxic-appearing.   HENT:      Mouth/Throat:      Mouth: Mucous membranes are dry.   Cardiovascular:      Rate and Rhythm: Normal rate and regular rhythm.      Comments: Dorsalis pedis pulses 2+ bilaterally    Pulmonary:      Effort: Pulmonary effort is normal. No respiratory distress.      Breath sounds: No wheezing, rhonchi or rales.      Comments: Negative Burton sign, negative McBurney's point tenderness    Chest:      Chest wall: No tenderness.   Abdominal:      General: There is no distension.      Palpations: Abdomen is soft.      Tenderness: There is no abdominal tenderness. There is no guarding or rebound.    Musculoskeletal:      Right lower leg: No edema.      Left lower leg: No edema.   Skin:     Capillary Refill: Capillary refill takes less than 2 seconds.   Neurological:      Mental Status: She is alert.      Comments: Gross motor and sensory function intact symmetrically and bilaterally to upper extremities and lower extremities.   Psychiatric:      Comments: Anxious           Review of prior notes in Care everywhere/Epic performed by myself:  Patient had an MRI of her abdomen 2015 MRCP revealing hepatic steatosis.  Fat-containing umbilical hernia.  No biliary ductal dilatation.  Questionable pyelonephritis.  She has no urinary symptoms today.        ED Course     If labs obtained, they are personally reviewed by myself:     Labs Reviewed   CBC WITH DIFFERENTIAL WITH PLATELET - Abnormal; Notable for the following components:       Result Value    WBC 11.2 (*)     Neutrophil Absolute Prelim 9.43 (*)     Neutrophil Absolute 9.43 (*)     All other components within normal limits   COMP METABOLIC PANEL (14) - Abnormal; Notable for the following components:    Glucose 243 (*)     Calculated Osmolality 298 (*)     Albumin 4.9 (*)     All other components within normal limits   URINALYSIS WITH CULTURE REFLEX - Abnormal; Notable for the following components:    Spec Gravity >1.030 (*)     Glucose Urine >1000 (*)     Ketones Urine >150 (*)     Blood Urine 3+ (*)     Protein Urine Trace (*)     RBC Urine >10 (*)     Squamous Epi. Cells Few (*)     All other components within normal limits   POCT GLUCOSE - Abnormal; Notable for the following components:    POC Glucose  242 (*)     All other components within normal limits   LIPASE - Normal   TROPONIN I HIGH SENSITIVITY - Normal   D-DIMER - Normal   PROTHROMBIN TIME (PT) - Normal   HCG, BETA SUBUNIT (QUANT PREGNANCY TEST) - Normal   RAINBOW DRAW BLUE       If radiologic studies ordered during today's ER visit, my independent interpretation are seen directly below.  This is  awaiting the radiologist's final interpretation.  CT abdomen/pelvis, independent interpretation completed by myself, awaiting final radiology interpretation: No perforation  Chest X-ray, independent interpretation completed by myself and awaiting formal radiology read:  No acute cardiopulmonary process, no obvious mass       Imaging Results read by radiology in ED: CT ABDOMEN+PELVIS(CONTRAST ONLY)(CPT=74177)    Result Date: 11/3/2024  CONCLUSION:   Mild hepatomegaly with diffuse hepatic steatosis.      Dictated by (CST): Kinsey Harper MD on 11/03/2024 at 11:27 AM     Finalized by (CST): Kinsey Harper MD on 11/03/2024 at 11:31 AM          XR CHEST PA + LAT CHEST (CPT=71046)    Result Date: 11/3/2024  CONCLUSION:   No focal opacity, pleural effusion, or pneumothorax.    Dictated by (CST): Salvador More MD on 11/03/2024 at 10:07 AM     Finalized by (CST): Salvador More MD on 11/03/2024 at 10:07 AM               ED Medications Administered:   Medications   ondansetron (Zofran) 4 MG/2ML injection 4 mg (4 mg Intravenous Given 11/3/24 0814)   sodium chloride 0.9 % IV bolus 1,000 mL (1,000 mL Intravenous New Bag 11/3/24 0814)   famotidine (Pepcid) 20 mg/2mL injection 20 mg (20 mg Intravenous Given 11/3/24 0928)   mylanta-dicyclomine-lidocaine 2% (G.I. Cocktail) oral liquid ( Oral Given 11/3/24 0928)   ondansetron (Zofran) 4 MG/2ML injection 4 mg (4 mg Intravenous Given by Other 11/3/24 0940)   fentaNYL (Sublimaze) 50 mcg/mL injection 75 mcg (75 mcg Intravenous Given 11/3/24 1122)   iopamidol 76% (ISOVUE-370) injection for power injector (80 mL Intravenous Given 11/3/24 1118)           Vitals:    11/03/24 0748 11/03/24 1043   BP: 126/85    Pulse: 79    Resp: 20    Temp: 97.8 °F (36.6 °C) 97.6 °F (36.4 °C)   TempSrc: Temporal Oral   SpO2: 100%    Weight: 108.9 kg      *I personally reviewed and interpreted all ED vitals.    Pulse Ox interpretation by myself: 100%, Room air, Normal     Monitor Interpretation by  myself:   normal sinus rhythm    If Ekg obtained during today's visit, it is independently interpreted by myself directly below:  EKG    Rate, intervals and axes as noted on EKG Report.  Rate: 80  Rhythm: Sinus Rhythm  Reading: no st elevation, no st depression, normal t waves                Medical Record Review: I personally reviewed available prior medical records for any recent pertinent discharge summaries, testing, and procedures and reviewed those reports.      [facility]     Medical decision making/ED Course:   39-year-old female who presents to the emergency room for vomiting and reported chest pain/shortness of breath after vomiting.  No crepitus noted to chest, equal distal pulses, neurologically and vascularly intact.  I suspect her symptoms seem most consistent with gastritis versus other non-life-threatening pathology.  No focal abdominal tenderness, extremely well-appearing.  Known history of diabetes and sugars slightly elevated to 43-no signs of DKA/HHS.  BMP otherwise appears normal.  LFTs appear stable.  White blood cell count of 11.2-likely reactive from her recent vomiting.  Hemoglobin 15.1 and normal.  Platelet count normal.  INR normal.  Troponin negative, EKG nonischemic.  She reports chest pain however I suspect her chest pain is second Lalit to esophagitis versus GERD.  Platelet count normal.  Boerhaave syndrome considered and seems unlikely.  Status post GI cocktail, Pepcid, Zofran-she is tolerating p.o.  No pleuritic pain, no lower extremity asymmetry, hypoxia to suggest PE.  Dimer negative.  Lipase normal and patient with no left upper quadrant tenderness to suggest pancreatitis.  Beta-hCG negative.  Urinalysis does not seem consistent with UTI and she has no urinary symptoms.  INR normal.  Patient had an episode of vomiting after initial medication and therefore CT abdomen/pelvis  obtained given her history of diabetes and thankfully negative for acute pathology.  Chest x-ray with no  pneumonia.  She is comfortable with discharge.  PPI twice daily prescribed.  AC considered and seems unlikely given duration of symptoms and the use of the high-sensitivity troponin.  Low heart score      ACS, dissection, PE, cardiac arrhythmia, pericardial effusion, CHF, Cholecystitis, AAA, pancreatitis, mesenteric ischemia, volvulus, bowel obstruction, appendicitis, among other life-threatening medical conditions considered and seems unlikely given patient's history, exam, and appearance.  Patient encouraged to follow-up with primary care provider in the next few days.  Advised to return to the emergency department for any worsening symptoms      Patient is non toxic appearing, is in no distress, hemodynamically stable.  Pt agrees and is aware of plan.       Differential Diagnosis:  as listed above in medical decision making.   *Please note that in the presenting to the emergency department, illness/injury that poses a threat to life or function is considered during this patient's initial evaluation.    The complexity of this visit is therefore inherently more complex given the need to consider life threatening pathology prior to any other etiology for this patient's visit.    The differential diagnosis and medical decision above exemplify this rationale.       Medical Decision Making  Problems Addressed:  Vomiting, unspecified vomiting type, unspecified whether nausea present: acute illness or injury    Amount and/or Complexity of Data Reviewed  External Data Reviewed: notes.  Labs: ordered. Decision-making details documented in ED Course.  Radiology: ordered and independent interpretation performed. Decision-making details documented in ED Course.  ECG/medicine tests: ordered. Decision-making details documented in ED Course.    Risk  Prescription drug management.               Vitals:    11/03/24 0748 11/03/24 1043   BP: 126/85    Pulse: 79    Resp: 20    Temp: 97.8 °F (36.6 °C) 97.6 °F (36.4 °C)   TempSrc:  Temporal Oral   SpO2: 100%    Weight: 108.9 kg              Complicating Factors: Significant medical problems that contribute to the complexity of this emergency room evaluation is listed above.    Condition upon leaving the department: Stable    Disposition and Plan     Clinical Impression:  1. Vomiting, unspecified vomiting type, unspecified whether nausea present        Disposition:  Discharge    Medications Prescribed:  Current Discharge Medication List        START taking these medications    Details   pantoprazole 40 MG Oral Tab EC Please take 1 tablet 30 minutes before breakfast and 30 minutes before dinner for the next 14 days.  Qty: 30 tablet, Refills: 0             I have discussed the discharge plan with the patient and/or family or well wisher present in the room with the patient's permission.  They state that they understand and agree with the plan.  All questions regarding their care have been answered prior to discharge.  They are aware: Emergency Department is not intended to be a substitute for an effort to provide complete medical care. The imaging, if any, have often been interpreted on a preliminary basis pending final reading by the radiologist.  Instructed to return immediately to the ED if any changes or worsening of condition should occur.  If patient's blood pressure was greater than 140/90 today, patient encouraged to have this blood pressure rechecked with primary MD and blood pressure education provided.                       [1] (Not in a hospital admission)

## 2024-11-04 ENCOUNTER — PATIENT MESSAGE (OUTPATIENT)
Dept: INTERNAL MEDICINE CLINIC | Facility: CLINIC | Age: 39
End: 2024-11-04

## 2024-11-05 ENCOUNTER — OFFICE VISIT (OUTPATIENT)
Dept: INTERNAL MEDICINE CLINIC | Facility: CLINIC | Age: 39
End: 2024-11-05
Payer: COMMERCIAL

## 2024-11-05 VITALS
DIASTOLIC BLOOD PRESSURE: 90 MMHG | TEMPERATURE: 98 F | HEIGHT: 64 IN | OXYGEN SATURATION: 98 % | SYSTOLIC BLOOD PRESSURE: 140 MMHG | HEART RATE: 98 BPM | BODY MASS INDEX: 37.56 KG/M2 | WEIGHT: 220 LBS

## 2024-11-05 DIAGNOSIS — K52.9 GASTROENTERITIS: Primary | ICD-10-CM

## 2024-11-05 PROCEDURE — 99213 OFFICE O/P EST LOW 20 MIN: CPT | Performed by: INTERNAL MEDICINE

## 2024-11-05 RX ORDER — ONDANSETRON 4 MG/1
4 TABLET, FILM COATED ORAL EVERY 8 HOURS PRN
Qty: 20 TABLET | Refills: 2 | Status: SHIPPED | OUTPATIENT
Start: 2024-11-05 | End: 2024-11-19

## 2024-11-05 RX ORDER — DAPAGLIFLOZIN 10 MG/1
10 TABLET, FILM COATED ORAL DAILY
Qty: 90 TABLET | Refills: 1 | Status: SHIPPED | OUTPATIENT
Start: 2024-11-05

## 2024-11-05 NOTE — PROGRESS NOTES
Anjana Raymundo is a 39 year old female.  Chief Complaint   Patient presents with    Follow - Up     ER follow up, woke up Sunday morning with n/v, unable to keep anything down, developed Cold sweats & chest pain (eval'd in ER on 11/3), was able to eat breakfast this morning and keep it down, but still having Abd pain        HPI:   Anjana Raymundo is a 39 year old female who presents for:    0330 Sunday morning; woke up and threw up. Then felt better, then cold sweats started. Continued to throw up.   Then went to ER.   Couldn't keep down pedialyte    Ate 1/2 piece of toast at 0500, had a little water. Went back to sleep. At 0900, had pudding.     Difficulty keeping things down        Wt Readings from Last 6 Encounters:   11/05/24 220 lb (99.8 kg)   11/03/24 240 lb (108.9 kg)   07/22/24 243 lb (110.2 kg)   05/20/24 247 lb (112 kg)   02/20/24 252 lb (114.3 kg)   12/22/23 258 lb (117 kg)     Body mass index is 37.76 kg/m².       Current Outpatient Medications   Medication Sig Dispense Refill    Prenatal Vit-Fe Fumarate-FA (PRENATAL VITAMIN OR) Take 1 tablet by mouth daily.      atorvastatin 40 MG Oral Tab Take 1 tablet (40 mg total) by mouth nightly. 90 tablet 1    buPROPion  MG Oral Tablet 24 Hr Take 1 tablet (150 mg total) by mouth daily. 90 tablet 3    metFORMIN HCl 1000 MG Oral Tab TAKE 1 TABLET BY MOUTH TWICE A  tablet 1    Tirzepatide (MOUNJARO) 5 MG/0.5ML Subcutaneous Solution Pen-injector Inject 5 mg into the skin once a week. 6 mL 1    glimepiride 4 MG Oral Tab Take 2 tablets (8 mg total) by mouth before breakfast. (Patient taking differently: Take 1 tablet (4 mg total) by mouth before breakfast.) 180 tablet 1    dapagliflozin (FARXIGA) 10 MG Oral Tab Take 1 tablet (10 mg total) by mouth daily. 90 tablet 1    Glucose Blood (ONETOUCH VERIO) In Vitro Strip Use to check blood sugar daily. 100 strip 2    lisinopril 2.5 MG Oral Tab TAKE 1 TABLET BY MOUTH EVERY DAY 90 tablet 3    venlafaxine ER 75  MG Oral Capsule SR 24 Hr TAKE 1 CAPSULE BY MOUTH EVERY DAY 90 capsule 3    ALPRAZolam (XANAX) 0.25 MG Oral Tab Take 1 tablet (0.25 mg total) by mouth nightly as needed. 30 tablet 0    omeprazole 20 MG Oral Capsule Delayed Release Take 1 capsule (20 mg total) by mouth daily. 90 capsule 3    ONETOUCH VERIO REFLECT w/Device Does not apply Kit USING TWICE DAILY 1 kit 0    Meloxicam 7.5 MG Oral Tab Take 1 tablet (7.5 mg total) by mouth daily. (Patient taking differently: Take 1 tablet (7.5 mg total) by mouth daily as needed.) 30 tablet 1    diphenhydrAMINE HCl (ALLERGY MEDICINE OR) 1 tablet daily      BIOTIN 5000 OR Take 1 tablet by mouth daily.      aspirin 81 MG Oral Tab Take 1 tablet (81 mg total) by mouth daily.      pantoprazole 40 MG Oral Tab EC Please take 1 tablet 30 minutes before breakfast and 30 minutes before dinner for the next 14 days. (Patient not taking: Reported on 11/5/2024) 30 tablet 0      Past Medical History:    Allergic rhinitis    Anesthesia complication    sedation didnt work. remembered entire procedure.    Anxiety    It just happens from time to time    Asthma (HCC)    in high school    Esophageal reflux    Learning disability    Obesity    Reflux    Type II or unspecified type diabetes mellitus without mention of complication, not stated as uncontrolled    Wears glasses      Past Surgical History:   Procedure Laterality Date    Ankle scope,extens debridemnt Right 9/16/2014    Procedure: ARTHROSCOPY ANKLE WITH DEBRIDEMENT;  Surgeon: Noah Morgan MD;  Location: [facility]    Gastrocnemius recession Right 9/16/2014    Procedure: GASTROC RECESSION FOOT;  Surgeon: Noah Morgan MD;  Location: [facility]    Hc implant ear tubes  1990    Molecular extraction (m11e.1)  2002    wisdom teeth.    Other surgical history      wisdom teeth removed    Other surgical history      bilateral tubes in ears    Other surgical history      R annkle surgery      Family History   Problem Relation  Age of Onset    Diabetes Father 40    Hypertension Father     Diabetes Mother 40    Seizure Disorder Brother     Cancer Maternal Grandmother         unknown    Cancer Paternal Uncle         lung, smoker      Social History:   Social History     Socioeconomic History    Marital status: Life Partner   Tobacco Use    Smoking status: Former     Current packs/day: 0.00     Types: Cigarettes     Quit date: 3/25/2002     Years since quittin.6    Smokeless tobacco: Never    Tobacco comments:     0.5 pack q 2 wks   Vaping Use    Vaping status: Never Used   Substance and Sexual Activity    Alcohol use: Yes     Comment: Maybe 2 times a month    Drug use: Yes     Frequency: 7.0 times per week     Types: Cannabis   Other Topics Concern    Caffeine Concern Yes     Comment: Coffee - 1 cup per day           REVIEW OF SYSTEMS:   GENERAL: feels well otherwise  GI: +nausea, vomiting, diarrhea      EXAM:   /90   Pulse 98   Temp 98.3 °F (36.8 °C) (Oral)   Ht 5' 4\" (1.626 m)   Wt 220 lb (99.8 kg)   LMP 2024 (Exact Date)   SpO2 98%   BMI 37.76 kg/m²     GENERAL: well developed, well nourished, in no apparent distress  No scleral icterus  Abd: soft, mild epigastric tenderness; NABS. No palpable mass. Neg murphys sign      ASSESSMENT AND PLAN:   Nausea and vomiting  Likely viral in nature; give it time; adhere to bland, brat diet. Given rx for zofran  Continue PPI therapy      Gisel Swift DO  2024  11:35 AM

## 2024-11-12 ENCOUNTER — HOSPITAL ENCOUNTER (EMERGENCY)
Facility: HOSPITAL | Age: 39
Discharge: HOME | End: 2024-11-12
Attending: EMERGENCY MEDICINE
Payer: COMMERCIAL

## 2024-11-12 VITALS
SYSTOLIC BLOOD PRESSURE: 119 MMHG | BODY MASS INDEX: 37.56 KG/M2 | HEIGHT: 64 IN | OXYGEN SATURATION: 100 % | WEIGHT: 220 LBS | DIASTOLIC BLOOD PRESSURE: 67 MMHG | TEMPERATURE: 98 F | HEART RATE: 88 BPM | RESPIRATION RATE: 20 BRPM

## 2024-11-12 DIAGNOSIS — R11.2 NAUSEA AND VOMITING, UNSPECIFIED VOMITING TYPE: Primary | ICD-10-CM

## 2024-11-12 DIAGNOSIS — E87.6 HYPOKALEMIA: ICD-10-CM

## 2024-11-12 LAB
ALBUMIN SERPL-MCNC: 4.8 G/DL (ref 3.2–4.8)
ALP LIVER SERPL-CCNC: 68 U/L
ALT SERPL-CCNC: 19 U/L
ANION GAP SERPL CALC-SCNC: 9 MMOL/L (ref 0–18)
AST SERPL-CCNC: 17 U/L (ref ?–34)
B-HCG UR QL: NEGATIVE
BASOPHILS # BLD AUTO: 0.03 X10(3) UL (ref 0–0.2)
BASOPHILS NFR BLD AUTO: 0.3 %
BILIRUB DIRECT SERPL-MCNC: 0.1 MG/DL (ref ?–0.3)
BILIRUB SERPL-MCNC: 0.4 MG/DL (ref 0.3–1.2)
BILIRUB UR QL: NEGATIVE
BUN BLD-MCNC: 8 MG/DL (ref 9–23)
BUN/CREAT SERPL: 11.6 (ref 10–20)
CALCIUM BLD-MCNC: 9.9 MG/DL (ref 8.7–10.4)
CHLORIDE SERPL-SCNC: 103 MMOL/L (ref 98–112)
CLARITY UR: CLEAR
CO2 SERPL-SCNC: 28 MMOL/L (ref 21–32)
COLOR UR: COLORLESS
CREAT BLD-MCNC: 0.69 MG/DL
DEPRECATED RDW RBC AUTO: 37.3 FL (ref 35.1–46.3)
EGFRCR SERPLBLD CKD-EPI 2021: 112 ML/MIN/1.73M2 (ref 60–?)
EOSINOPHIL # BLD AUTO: 0.03 X10(3) UL (ref 0–0.7)
EOSINOPHIL NFR BLD AUTO: 0.3 %
ERYTHROCYTE [DISTWIDTH] IN BLOOD BY AUTOMATED COUNT: 12.8 % (ref 11–15)
GLUCOSE BLD-MCNC: 86 MG/DL (ref 70–99)
GLUCOSE UR-MCNC: >1000 MG/DL
HCT VFR BLD AUTO: 41.7 %
HGB BLD-MCNC: 14.2 G/DL
HGB UR QL STRIP.AUTO: NEGATIVE
IMM GRANULOCYTES # BLD AUTO: 0.03 X10(3) UL (ref 0–1)
IMM GRANULOCYTES NFR BLD: 0.3 %
KETONES UR-MCNC: 80 MG/DL
LEUKOCYTE ESTERASE UR QL STRIP.AUTO: NEGATIVE
LIPASE SERPL-CCNC: 45 U/L (ref 12–53)
LYMPHOCYTES # BLD AUTO: 2.87 X10(3) UL (ref 1–4)
LYMPHOCYTES NFR BLD AUTO: 31.3 %
MCH RBC QN AUTO: 27.6 PG (ref 26–34)
MCHC RBC AUTO-ENTMCNC: 34.1 G/DL (ref 31–37)
MCV RBC AUTO: 81 FL
MONOCYTES # BLD AUTO: 0.66 X10(3) UL (ref 0.1–1)
MONOCYTES NFR BLD AUTO: 7.2 %
NEUTROPHILS # BLD AUTO: 5.54 X10 (3) UL (ref 1.5–7.7)
NEUTROPHILS # BLD AUTO: 5.54 X10(3) UL (ref 1.5–7.7)
NEUTROPHILS NFR BLD AUTO: 60.6 %
NITRITE UR QL STRIP.AUTO: NEGATIVE
OSMOLALITY SERPL CALC.SUM OF ELEC: 288 MOSM/KG (ref 275–295)
PH UR: 6.5 [PH] (ref 5–8)
PLATELET # BLD AUTO: 303 10(3)UL (ref 150–450)
POTASSIUM SERPL-SCNC: 3.2 MMOL/L (ref 3.5–5.1)
PROT SERPL-MCNC: 7.1 G/DL (ref 5.7–8.2)
PROT UR-MCNC: NEGATIVE MG/DL
RBC # BLD AUTO: 5.15 X10(6)UL
SODIUM SERPL-SCNC: 140 MMOL/L (ref 136–145)
SP GR UR STRIP: 1.01 (ref 1–1.03)
UROBILINOGEN UR STRIP-ACNC: NORMAL
WBC # BLD AUTO: 9.2 X10(3) UL (ref 4–11)

## 2024-11-12 PROCEDURE — 96361 HYDRATE IV INFUSION ADD-ON: CPT

## 2024-11-12 PROCEDURE — 85025 COMPLETE CBC W/AUTO DIFF WBC: CPT | Performed by: EMERGENCY MEDICINE

## 2024-11-12 PROCEDURE — 80076 HEPATIC FUNCTION PANEL: CPT | Performed by: EMERGENCY MEDICINE

## 2024-11-12 PROCEDURE — S0028 INJECTION, FAMOTIDINE, 20 MG: HCPCS | Performed by: EMERGENCY MEDICINE

## 2024-11-12 PROCEDURE — 81003 URINALYSIS AUTO W/O SCOPE: CPT | Performed by: EMERGENCY MEDICINE

## 2024-11-12 PROCEDURE — 99284 EMERGENCY DEPT VISIT MOD MDM: CPT

## 2024-11-12 PROCEDURE — 81025 URINE PREGNANCY TEST: CPT

## 2024-11-12 PROCEDURE — 80048 BASIC METABOLIC PNL TOTAL CA: CPT | Performed by: EMERGENCY MEDICINE

## 2024-11-12 PROCEDURE — 93005 ELECTROCARDIOGRAM TRACING: CPT

## 2024-11-12 PROCEDURE — 93010 ELECTROCARDIOGRAM REPORT: CPT

## 2024-11-12 PROCEDURE — 96374 THER/PROPH/DIAG INJ IV PUSH: CPT

## 2024-11-12 PROCEDURE — 83690 ASSAY OF LIPASE: CPT | Performed by: EMERGENCY MEDICINE

## 2024-11-12 PROCEDURE — 96375 TX/PRO/DX INJ NEW DRUG ADDON: CPT

## 2024-11-12 RX ORDER — POTASSIUM CHLORIDE 1500 MG/1
40 TABLET, EXTENDED RELEASE ORAL ONCE
Status: COMPLETED | OUTPATIENT
Start: 2024-11-12 | End: 2024-11-12

## 2024-11-12 RX ORDER — ONDANSETRON 4 MG/1
4 TABLET, ORALLY DISINTEGRATING ORAL EVERY 4 HOURS PRN
Qty: 10 TABLET | Refills: 0 | Status: SHIPPED | OUTPATIENT
Start: 2024-11-12 | End: 2024-11-19

## 2024-11-12 RX ORDER — FAMOTIDINE 10 MG/ML
20 INJECTION, SOLUTION INTRAVENOUS ONCE
Status: COMPLETED | OUTPATIENT
Start: 2024-11-12 | End: 2024-11-12

## 2024-11-12 RX ORDER — KETOROLAC TROMETHAMINE 15 MG/ML
15 INJECTION, SOLUTION INTRAMUSCULAR; INTRAVENOUS ONCE
Status: COMPLETED | OUTPATIENT
Start: 2024-11-12 | End: 2024-11-12

## 2024-11-12 RX ORDER — ONDANSETRON 2 MG/ML
4 INJECTION INTRAMUSCULAR; INTRAVENOUS ONCE
Status: COMPLETED | OUTPATIENT
Start: 2024-11-12 | End: 2024-11-12

## 2024-11-12 NOTE — ED INITIAL ASSESSMENT (HPI)
Pt to ed w/c of N/V denies diarrhea. Pt states she recently had a stomach virus and reports feeling better since Friday.

## 2024-11-12 NOTE — ED PROVIDER NOTES
Patient Seen in: [facility] Emergency Department    History     Chief Complaint   Patient presents with    Nausea/Vomiting/Diarrhea       HPI    39-year-old female with a history of asthma, diabetes, anxiety, GERD presents ER today with vomiting that started up again last night she was here last week for similar symptoms, had full workup including labs and CT nothing acute was diagnosed with a viral illness.  Discharged home with Zofran which she has been taking which has been helping.  Patient she felt almost back to normal yesterday and then today symptoms started up again.  She had recently take her normal dose of her Mounjaro which she was late on because of the illness last week.  Unsure if it is the side effects of that versus something else.  Called her doctor told her to come to the ER to be reevaluated    History reviewed.   Past Medical History:    Allergic rhinitis    Anesthesia complication    sedation didnt work. remembered entire procedure.    Anxiety    It just happens from time to time    Asthma (HCC)    in high school    Esophageal reflux    Learning disability    Obesity    Reflux    Type II or unspecified type diabetes mellitus without mention of complication, not stated as uncontrolled    Wears glasses       History reviewed.   Past Surgical History:   Procedure Laterality Date    Ankle scope,extens debridemnt Right 9/16/2014    Procedure: ARTHROSCOPY ANKLE WITH DEBRIDEMENT;  Surgeon: Noah Morgan MD;  Location: [facility]    Gastrocnemius recession Right 9/16/2014    Procedure: GASTROC RECESSION FOOT;  Surgeon: Noah Morgan MD;  Location: [facility]    Hc implant ear tubes  1990    Molecular extraction (m11e.1)  2002    wisdom teeth.    Other surgical history      wisdom teeth removed    Other surgical history      bilateral tubes in ears    Other surgical history      R chang surgery         Medications :  Prescriptions Prior to Admission[1]     Family History    Problem Relation Age of Onset    Diabetes Father 40    Hypertension Father     Diabetes Mother 40    Seizure Disorder Brother     Cancer Maternal Grandmother         unknown    Cancer Paternal Uncle         lung, smoker       Smoking Status:   Social History     Socioeconomic History    Marital status: Life Partner   Tobacco Use    Smoking status: Former     Current packs/day: 0.00     Types: Cigarettes     Quit date: 3/25/2002     Years since quittin.6    Smokeless tobacco: Never    Tobacco comments:     0.5 pack q 2 wks   Vaping Use    Vaping status: Never Used   Substance and Sexual Activity    Alcohol use: Yes     Comment: Maybe 2 times a month    Drug use: Yes     Frequency: 7.0 times per week     Types: Cannabis   Other Topics Concern    Caffeine Concern Yes     Comment: Coffee - 1 cup per day        Constitutional and vital signs reviewed.      Social History and Family History elements reviewed from today, pertinent positives to the presenting problem noted.    Physical Exam     ED Triage Vitals [24 1607]   BP (!) 135/92   Pulse 98   Resp 21   Temp 98.2 °F (36.8 °C)   Temp src Oral   SpO2 100 %   O2 Device None (Room air)       All measures to prevent infection transmission during my interaction with the patient were taken. Handwashing was performed prior to and after the exam.  Stethoscope and any equipment used during my examination was cleaned with super sani-cloth germicidal wipes following the exam.     Physical Exam  Vitals and nursing note reviewed.   Cardiovascular:      Rate and Rhythm: Normal rate.      Heart sounds: Normal heart sounds.   Pulmonary:      Effort: Pulmonary effort is normal.   Abdominal:      General: Abdomen is flat.      Palpations: Abdomen is soft.      Tenderness: There is abdominal tenderness in the epigastric area.   Skin:     General: Skin is warm and dry.      Capillary Refill: Capillary refill takes less than 2 seconds.   Neurological:      General: No focal  deficit present.      Mental Status: She is alert.         ED Course        Labs Reviewed   URINALYSIS, ROUTINE - Abnormal; Notable for the following components:       Result Value    Urine Color Colorless (*)     Glucose Urine >1000 (*)     Ketones Urine 80 (*)     All other components within normal limits   BASIC METABOLIC PANEL (8) - Abnormal; Notable for the following components:    Potassium 3.2 (*)     BUN 8 (*)     All other components within normal limits   LIPASE - Normal   HEPATIC FUNCTION PANEL (7) - Normal   POCT PREGNANCY URINE - Normal   CBC WITH DIFFERENTIAL WITH PLATELET   RAINBOW DRAW LAVENDER   RAINBOW DRAW LIGHT GREEN   RAINBOW DRAW BLUE   RAINBOW DRAW GOLD     EKG    Rate, intervals and axes as noted on EKG Report.  Rate: 89  Rhythm: Sinus Rhythm  Reading: Normal sinus rhythm, heart rate 89, normal intervals, normal axis           As Interpreted by me    Imaging Results Available and Reviewed while in ED: No results found.  ED Medications Administered:   Medications   sodium chloride 0.9 % IV bolus 1,000 mL (1,000 mL Intravenous New Bag 11/12/24 1716)   potassium chloride (Klor-Con M20) tab 40 mEq (has no administration in time range)   famotidine (Pepcid) 20 mg/2mL injection 20 mg (20 mg Intravenous Given 11/12/24 1718)   ondansetron (Zofran) 4 MG/2ML injection 4 mg (4 mg Intravenous Given 11/12/24 1716)   ketorolac (Toradol) 15 MG/ML injection 15 mg (15 mg Intravenous Given 11/12/24 1718)         MDM     Vitals:    11/12/24 1607   BP: (!) 135/92   Pulse: 98   Resp: 21   Temp: 98.2 °F (36.8 °C)   TempSrc: Oral   SpO2: 100%   Weight: 99.8 kg   Height: 162.6 cm (5' 4\")     *I personally reviewed and interpreted all ED vitals.    Pulse Ox: 100%, Room air, Normal     Monitor Interpretation:   normal sinus rhythm as interpreted by me.  The cardiac monitor was ordered to monitor cardiac rate and rhythm.    Differential Diagnosis/ Diagnostic Considerations: Viral illness, electrode abnormalities,  MI    Complicating Factors: The patient already has does not have any pertinent problems on file. to contribute to the complexity of this ED evaluation.    Medical Decision Making  Amount and/or Complexity of Data Reviewed  External Data Reviewed:      Details: Reviewed last week's ER visit notes and labs to compare to today's labs.  Labs: ordered. Decision-making details documented in ED Course.    Risk  OTC drugs.  Prescription drug management.      I reviewed all results with patient and family at the bedside.  Potassium slightly low otherwise nothing else acute on labs.  Patient is feeling better and tolerating p.o. fluids.  Replace potassium in the ED.  I explained the patient take Zofran as needed for nausea/vomiting.  Follow-up with her primary care provider in 1 to 2 days.  Return to the ER if symptoms continue, get worse, unable to follow-up  Condition upon leaving the department: Stable    Disposition and Plan     Clinical Impression:  1. Nausea and vomiting, unspecified vomiting type    2. Hypokalemia        Disposition:  Discharge    Follow-up:  Gisel Swift DO  [address] [place] [id number]  [phone]    Follow up        Medications Prescribed:  Current Discharge Medication List        START taking these medications    Details   ondansetron 4 MG Oral Tablet Dispersible Take 1 tablet (4 mg total) by mouth every 4 (four) hours as needed for Nausea.  Qty: 10 tablet, Refills: 0                              [1] (Not in a hospital admission)

## 2024-11-13 ENCOUNTER — PATIENT MESSAGE (OUTPATIENT)
Dept: ENDOCRINOLOGY CLINIC | Facility: CLINIC | Age: 39
End: 2024-11-13

## 2024-11-13 LAB
ATRIAL RATE: 89 BPM
P AXIS: 66 DEGREES
P-R INTERVAL: 134 MS
Q-T INTERVAL: 360 MS
QRS DURATION: 74 MS
QTC CALCULATION (BEZET): 438 MS
R AXIS: 51 DEGREES
T AXIS: 11 DEGREES
VENTRICULAR RATE: 89 BPM

## 2024-11-13 NOTE — DISCHARGE INSTRUCTIONS
Take medications as prescribed.  Follow-up with your primary care provider in 1 to 2 days.  Return to ER if symptoms continue, get worse, unable to follow-up

## 2024-11-14 NOTE — TELEPHONE ENCOUNTER
Dr Balderas,    Patient asking for blood work prior follow up in December 17. Reports stomach problems since 11/3. Called patient reports went to ER on Monday per Dr Jeniffer shorte  --labs, CT and Xray were negative.     Reports mix of constipation and diarrhea. Reports feelings of fullness. Reports vomiting since Friday on and off after meals. Denies any recent fever. She mentioned having more GI issues this year.    Was due for Mounjaro injection on Thursday 11/7  and skipped dose to see if she felt better. She took injection again on Monday 11/11. RN advised patient to avoid irritant foods and to hydrate well. Patient verbalizes understanding.    Denies checking her BG recently    Reports compliance with:    glimepiride 4 mg daily     Metformin 1000 mg BID     Mounjaro 5 mg  (last injection on Monday) has been on mounjaro for 2 years.

## 2024-11-14 NOTE — TELEPHONE ENCOUNTER
Update noted. A1C on 7/22/2024 was 6.1% - controlled       I am sorry to hear that she has been struggling with GI s/e this year. Please clarify on how she felt the week she held taking mounjaro? Did her GI symptoms resolve? If yes, perhaps we can try reducing dose to 2.5mg weekly dose until she is seen for her follow up apt in the office?       Will defer labs to Dr. Balderas     hepatic panel on 11/12- normal   Lipase on 11/12 - normal   BMP on 11/12 - normal outside of low potassium at 3.2       Thank you!

## 2024-11-15 ENCOUNTER — TELEPHONE (OUTPATIENT)
Dept: INTERNAL MEDICINE CLINIC | Facility: CLINIC | Age: 39
End: 2024-11-15

## 2024-11-15 DIAGNOSIS — R11.2 NAUSEA AND VOMITING, UNSPECIFIED VOMITING TYPE: Primary | ICD-10-CM

## 2024-11-15 NOTE — TELEPHONE ENCOUNTER
Patient is calling and would like to speak to Dr Swift Directly in regards to stomach issues. Patient states she does not want to talk to a nurse this time.   Patient was upset and crying that no one is able to help her about her stomach issues.    Please call patient at [phone]

## 2024-11-18 NOTE — TELEPHONE ENCOUNTER
Feeling a little better  She felt like it was the medications fromER and the zofran making her worse  She is starting to feel better    I discussed that as a possibility the mounjaro dose may need to be lowered, also possibility of intermittent bowel obstruction that is not showing up on CT.     She will give herself the mounjaro today and see how she feels

## 2024-11-19 ENCOUNTER — HOSPITAL ENCOUNTER (OUTPATIENT)
Facility: HOSPITAL | Age: 39
Setting detail: OBSERVATION
Discharge: HOME | End: 2024-11-21
Attending: EMERGENCY MEDICINE | Admitting: INTERNAL MEDICINE
Payer: COMMERCIAL

## 2024-11-19 ENCOUNTER — APPOINTMENT (OUTPATIENT)
Dept: CT IMAGING | Facility: HOSPITAL | Age: 39
End: 2024-11-19
Attending: EMERGENCY MEDICINE
Payer: COMMERCIAL

## 2024-11-19 DIAGNOSIS — E11.9 TYPE 2 DIABETES MELLITUS WITHOUT COMPLICATION, WITHOUT LONG-TERM CURRENT USE OF INSULIN (HCC): ICD-10-CM

## 2024-11-19 DIAGNOSIS — R11.10 INTRACTABLE VOMITING: Primary | ICD-10-CM

## 2024-11-19 LAB
ALBUMIN SERPL-MCNC: 4.6 G/DL (ref 3.2–4.8)
ALBUMIN/GLOB SERPL: 1.8 {RATIO} (ref 1–2)
ALP LIVER SERPL-CCNC: 56 U/L
ALT SERPL-CCNC: 19 U/L
ANION GAP SERPL CALC-SCNC: 10 MMOL/L (ref 0–18)
AST SERPL-CCNC: 16 U/L (ref ?–34)
BASOPHILS # BLD AUTO: 0.03 X10(3) UL (ref 0–0.2)
BASOPHILS NFR BLD AUTO: 0.3 %
BILIRUB SERPL-MCNC: 0.5 MG/DL (ref 0.3–1.2)
BILIRUB UR QL: NEGATIVE
BUN BLD-MCNC: 9 MG/DL (ref 9–23)
BUN/CREAT SERPL: 14.5 (ref 10–20)
CALCIUM BLD-MCNC: 9.8 MG/DL (ref 8.7–10.4)
CHLORIDE SERPL-SCNC: 105 MMOL/L (ref 98–112)
CLARITY UR: CLEAR
CO2 SERPL-SCNC: 25 MMOL/L (ref 21–32)
CREAT BLD-MCNC: 0.62 MG/DL
DEPRECATED RDW RBC AUTO: 39.5 FL (ref 35.1–46.3)
EGFRCR SERPLBLD CKD-EPI 2021: 115 ML/MIN/1.73M2 (ref 60–?)
EOSINOPHIL # BLD AUTO: 0.01 X10(3) UL (ref 0–0.7)
EOSINOPHIL NFR BLD AUTO: 0.1 %
ERYTHROCYTE [DISTWIDTH] IN BLOOD BY AUTOMATED COUNT: 13.3 % (ref 11–15)
GLOBULIN PLAS-MCNC: 2.5 G/DL (ref 2–3.5)
GLUCOSE BLD-MCNC: 149 MG/DL (ref 70–99)
GLUCOSE BLDC GLUCOMTR-MCNC: 91 MG/DL (ref 70–99)
GLUCOSE UR-MCNC: >1000 MG/DL
HCT VFR BLD AUTO: 43.9 %
HGB BLD-MCNC: 15.2 G/DL
IMM GRANULOCYTES # BLD AUTO: 0.02 X10(3) UL (ref 0–1)
IMM GRANULOCYTES NFR BLD: 0.2 %
KETONES UR-MCNC: >150 MG/DL
LEUKOCYTE ESTERASE UR QL STRIP.AUTO: NEGATIVE
LIPASE SERPL-CCNC: 36 U/L (ref 12–53)
LYMPHOCYTES # BLD AUTO: 1.18 X10(3) UL (ref 1–4)
LYMPHOCYTES NFR BLD AUTO: 13 %
MAGNESIUM SERPL-MCNC: 1.5 MG/DL (ref 1.6–2.6)
MCH RBC QN AUTO: 28.7 PG (ref 26–34)
MCHC RBC AUTO-ENTMCNC: 34.6 G/DL (ref 31–37)
MCV RBC AUTO: 83 FL
MONOCYTES # BLD AUTO: 0.38 X10(3) UL (ref 0.1–1)
MONOCYTES NFR BLD AUTO: 4.2 %
NEUTROPHILS # BLD AUTO: 7.47 X10 (3) UL (ref 1.5–7.7)
NEUTROPHILS # BLD AUTO: 7.47 X10(3) UL (ref 1.5–7.7)
NEUTROPHILS NFR BLD AUTO: 82.2 %
NITRITE UR QL STRIP.AUTO: NEGATIVE
OSMOLALITY SERPL CALC.SUM OF ELEC: 291 MOSM/KG (ref 275–295)
PH UR: 5.5 [PH] (ref 5–8)
PLATELET # BLD AUTO: 318 10(3)UL (ref 150–450)
POTASSIUM SERPL-SCNC: 3.9 MMOL/L (ref 3.5–5.1)
PROT SERPL-MCNC: 7.1 G/DL (ref 5.7–8.2)
PROT UR-MCNC: 20 MG/DL
RBC # BLD AUTO: 5.29 X10(6)UL
RBC #/AREA URNS AUTO: >10 /HPF
SODIUM SERPL-SCNC: 140 MMOL/L (ref 136–145)
SP GR UR STRIP: >1.03 (ref 1–1.03)
UROBILINOGEN UR STRIP-ACNC: NORMAL
WBC # BLD AUTO: 9.1 X10(3) UL (ref 4–11)

## 2024-11-19 PROCEDURE — 74177 CT ABD & PELVIS W/CONTRAST: CPT | Performed by: EMERGENCY MEDICINE

## 2024-11-19 RX ORDER — ATORVASTATIN CALCIUM 40 MG/1
40 TABLET, FILM COATED ORAL NIGHTLY
Status: DISCONTINUED | OUTPATIENT
Start: 2024-11-20 | End: 2024-11-21

## 2024-11-19 RX ORDER — LISINOPRIL 2.5 MG/1
2.5 TABLET ORAL DAILY
Status: DISCONTINUED | OUTPATIENT
Start: 2024-11-20 | End: 2024-11-21

## 2024-11-19 RX ORDER — SODIUM CHLORIDE 9 MG/ML
INJECTION, SOLUTION INTRAVENOUS CONTINUOUS
Status: DISCONTINUED | OUTPATIENT
Start: 2024-11-19 | End: 2024-11-19

## 2024-11-19 RX ORDER — DEXTROSE MONOHYDRATE AND SODIUM CHLORIDE 5; .9 G/100ML; G/100ML
INJECTION, SOLUTION INTRAVENOUS CONTINUOUS
Status: DISCONTINUED | OUTPATIENT
Start: 2024-11-19 | End: 2024-11-21

## 2024-11-19 RX ORDER — METOCLOPRAMIDE HYDROCHLORIDE 5 MG/ML
10 INJECTION INTRAMUSCULAR; INTRAVENOUS EVERY 6 HOURS PRN
Status: DISCONTINUED | OUTPATIENT
Start: 2024-11-19 | End: 2024-11-21

## 2024-11-19 RX ORDER — PANTOPRAZOLE SODIUM 40 MG/1
40 TABLET, DELAYED RELEASE ORAL
Status: DISCONTINUED | OUTPATIENT
Start: 2024-11-20 | End: 2024-11-20

## 2024-11-19 RX ORDER — ONDANSETRON 2 MG/ML
4 INJECTION INTRAMUSCULAR; INTRAVENOUS EVERY 6 HOURS PRN
Status: DISCONTINUED | OUTPATIENT
Start: 2024-11-19 | End: 2024-11-21

## 2024-11-19 RX ORDER — BUPROPION HYDROCHLORIDE 150 MG/1
150 TABLET ORAL DAILY
Status: DISCONTINUED | OUTPATIENT
Start: 2024-11-19 | End: 2024-11-21

## 2024-11-19 RX ORDER — MAGNESIUM SULFATE HEPTAHYDRATE 40 MG/ML
2 INJECTION, SOLUTION INTRAVENOUS ONCE
Status: COMPLETED | OUTPATIENT
Start: 2024-11-19 | End: 2024-11-19

## 2024-11-19 RX ORDER — METOCLOPRAMIDE HYDROCHLORIDE 5 MG/ML
10 INJECTION INTRAMUSCULAR; INTRAVENOUS ONCE
Status: COMPLETED | OUTPATIENT
Start: 2024-11-19 | End: 2024-11-19

## 2024-11-19 RX ORDER — ACETAMINOPHEN 325 MG/1
650 TABLET ORAL EVERY 6 HOURS PRN
Status: DISCONTINUED | OUTPATIENT
Start: 2024-11-19 | End: 2024-11-21

## 2024-11-19 RX ORDER — VENLAFAXINE HYDROCHLORIDE 75 MG/1
75 CAPSULE, EXTENDED RELEASE ORAL
Status: DISCONTINUED | OUTPATIENT
Start: 2024-11-20 | End: 2024-11-21

## 2024-11-19 RX ORDER — ALPRAZOLAM 0.25 MG/1
0.25 TABLET ORAL NIGHTLY PRN
Status: DISCONTINUED | OUTPATIENT
Start: 2024-11-19 | End: 2024-11-21

## 2024-11-20 ENCOUNTER — ANESTHESIA (OUTPATIENT)
Dept: ENDOSCOPY | Facility: HOSPITAL | Age: 39
End: 2024-11-20
Payer: COMMERCIAL

## 2024-11-20 ENCOUNTER — ANESTHESIA EVENT (OUTPATIENT)
Dept: ENDOSCOPY | Facility: HOSPITAL | Age: 39
End: 2024-11-20
Payer: COMMERCIAL

## 2024-11-20 ENCOUNTER — TELEPHONE (OUTPATIENT)
Dept: ENDOCRINOLOGY CLINIC | Facility: CLINIC | Age: 39
End: 2024-11-20

## 2024-11-20 LAB
ANION GAP SERPL CALC-SCNC: 7 MMOL/L (ref 0–18)
BASOPHILS # BLD AUTO: 0.02 X10(3) UL (ref 0–0.2)
BASOPHILS NFR BLD AUTO: 0.3 %
BUN BLD-MCNC: 7 MG/DL (ref 9–23)
BUN/CREAT SERPL: 14 (ref 10–20)
CALCIUM BLD-MCNC: 8.5 MG/DL (ref 8.7–10.4)
CHLORIDE SERPL-SCNC: 109 MMOL/L (ref 98–112)
CO2 SERPL-SCNC: 24 MMOL/L (ref 21–32)
CREAT BLD-MCNC: 0.5 MG/DL
DEPRECATED RDW RBC AUTO: 40.7 FL (ref 35.1–46.3)
EGFRCR SERPLBLD CKD-EPI 2021: 122 ML/MIN/1.73M2 (ref 60–?)
EOSINOPHIL # BLD AUTO: 0.09 X10(3) UL (ref 0–0.7)
EOSINOPHIL NFR BLD AUTO: 1.2 %
ERYTHROCYTE [DISTWIDTH] IN BLOOD BY AUTOMATED COUNT: 13.4 % (ref 11–15)
EST. AVERAGE GLUCOSE BLD GHB EST-MCNC: 128 MG/DL (ref 68–126)
GLUCOSE BLD-MCNC: 127 MG/DL (ref 70–99)
GLUCOSE BLDC GLUCOMTR-MCNC: 109 MG/DL (ref 70–99)
GLUCOSE BLDC GLUCOMTR-MCNC: 119 MG/DL (ref 70–99)
GLUCOSE BLDC GLUCOMTR-MCNC: 144 MG/DL (ref 70–99)
GLUCOSE BLDC GLUCOMTR-MCNC: 177 MG/DL (ref 70–99)
GLUCOSE BLDC GLUCOMTR-MCNC: 198 MG/DL (ref 70–99)
HBA1C MFR BLD: 6.1 % (ref ?–5.7)
HCG SERPL QL: NEGATIVE
HCT VFR BLD AUTO: 37.4 %
HGB BLD-MCNC: 12.2 G/DL
IMM GRANULOCYTES # BLD AUTO: 0.01 X10(3) UL (ref 0–1)
IMM GRANULOCYTES NFR BLD: 0.1 %
LYMPHOCYTES # BLD AUTO: 2.56 X10(3) UL (ref 1–4)
LYMPHOCYTES NFR BLD AUTO: 33.7 %
MAGNESIUM SERPL-MCNC: 2 MG/DL (ref 1.6–2.6)
MCH RBC QN AUTO: 27.5 PG (ref 26–34)
MCHC RBC AUTO-ENTMCNC: 32.6 G/DL (ref 31–37)
MCV RBC AUTO: 84.2 FL
MONOCYTES # BLD AUTO: 0.69 X10(3) UL (ref 0.1–1)
MONOCYTES NFR BLD AUTO: 9.1 %
NEUTROPHILS # BLD AUTO: 4.23 X10 (3) UL (ref 1.5–7.7)
NEUTROPHILS # BLD AUTO: 4.23 X10(3) UL (ref 1.5–7.7)
NEUTROPHILS NFR BLD AUTO: 55.6 %
OSMOLALITY SERPL CALC.SUM OF ELEC: 290 MOSM/KG (ref 275–295)
PLATELET # BLD AUTO: 269 10(3)UL (ref 150–450)
POTASSIUM SERPL-SCNC: 3 MMOL/L (ref 3.5–5.1)
POTASSIUM SERPL-SCNC: 4.2 MMOL/L (ref 3.5–5.1)
RBC # BLD AUTO: 4.44 X10(6)UL
SODIUM SERPL-SCNC: 140 MMOL/L (ref 136–145)
WBC # BLD AUTO: 7.6 X10(3) UL (ref 4–11)

## 2024-11-20 PROCEDURE — 0DB68ZX EXCISION OF STOMACH, VIA NATURAL OR ARTIFICIAL OPENING ENDOSCOPIC, DIAGNOSTIC: ICD-10-PCS | Performed by: INTERNAL MEDICINE

## 2024-11-20 PROCEDURE — 99204 OFFICE O/P NEW MOD 45 MIN: CPT | Performed by: INTERNAL MEDICINE

## 2024-11-20 PROCEDURE — 99222 1ST HOSP IP/OBS MODERATE 55: CPT | Performed by: INTERNAL MEDICINE

## 2024-11-20 PROCEDURE — 43239 EGD BIOPSY SINGLE/MULTIPLE: CPT | Performed by: INTERNAL MEDICINE

## 2024-11-20 RX ORDER — LIDOCAINE HYDROCHLORIDE 10 MG/ML
INJECTION, SOLUTION EPIDURAL; INFILTRATION; INTRACAUDAL; PERINEURAL AS NEEDED
Status: DISCONTINUED | OUTPATIENT
Start: 2024-11-20 | End: 2024-11-20 | Stop reason: SURG

## 2024-11-20 RX ORDER — POTASSIUM CHLORIDE 1.5 G/1.58G
40 POWDER, FOR SOLUTION ORAL EVERY 4 HOURS
Status: COMPLETED | OUTPATIENT
Start: 2024-11-20 | End: 2024-11-20

## 2024-11-20 RX ORDER — SODIUM CHLORIDE, SODIUM LACTATE, POTASSIUM CHLORIDE, CALCIUM CHLORIDE 600; 310; 30; 20 MG/100ML; MG/100ML; MG/100ML; MG/100ML
INJECTION, SOLUTION INTRAVENOUS CONTINUOUS PRN
Status: DISCONTINUED | OUTPATIENT
Start: 2024-11-20 | End: 2024-11-20 | Stop reason: SURG

## 2024-11-20 RX ORDER — PANTOPRAZOLE SODIUM 40 MG/1
40 TABLET, DELAYED RELEASE ORAL
Status: DISCONTINUED | OUTPATIENT
Start: 2024-11-20 | End: 2024-11-21

## 2024-11-20 RX ORDER — FLUCONAZOLE 150 MG/1
150 TABLET ORAL ONCE
Status: COMPLETED | OUTPATIENT
Start: 2024-11-20 | End: 2024-11-20

## 2024-11-20 RX ADMIN — SODIUM CHLORIDE, SODIUM LACTATE, POTASSIUM CHLORIDE, CALCIUM CHLORIDE: 600; 310; 30; 20 INJECTION, SOLUTION INTRAVENOUS at 13:02:00

## 2024-11-20 RX ADMIN — LIDOCAINE HYDROCHLORIDE 50 MG: 10 INJECTION, SOLUTION EPIDURAL; INFILTRATION; INTRACAUDAL; PERINEURAL at 13:08:00

## 2024-11-20 NOTE — ANESTHESIA POSTPROCEDURE EVALUATION
Patient: Anjana Raymundo    Procedure Summary       Date: 11/20/24 Room / Location: [facility] ENDOSCOPY 01 / [facility] ENDOSCOPY    Anesthesia Start: 1302 Anesthesia Stop:     Procedure: ESOPHAGOGASTRODUODENOSCOPY (EGD) Diagnosis: (NORMAL EXAM)    Surgeons: Quin Bernal MD Anesthesiologist:     Anesthesia Type: MAC ASA Status: 2            Anesthesia Type: No value filed.    Vitals Value Taken Time   /80 11/20/24 1320   Temp 97.3 11/20/24 1322   Pulse 90 11/20/24 1322   Resp 14 11/20/24 1322   SpO2 94 % 11/20/24 1322   Vitals shown include unfiled device data.    [facility] AN Post Evaluation:   Patient Evaluated in PACU  Patient Participation: complete - patient participated  Level of Consciousness: awake and alert  Pain Score: 0  Pain Management: adequate  Airway Patency:patent  Yes    Nausea/Vomiting: none  Cardiovascular Status: acceptable, blood pressure returned to baseline and stable  Respiratory Status: acceptable and airway suctioned  Postoperative Hydration acceptable      Raffi Faria CRNA  11/20/2024 1:22 PM

## 2024-11-20 NOTE — CONSULTS
Is this a shared or split note between Advanced Practice Provider and Physician? Yes      [facility]   Gastroenterology Consultation Note    Anjana Raymundo  Patient Status:    Observation  Date of Admission:         11/19/2024, Hospital day #0  Attending:   Kay Gonzalez MD  PCP:     Gisel Swift DO    Reason for Consultation:  ABD pain, N/V    History of Present Illness:  Anjana Raymundo is a 39 year old female w/ PMHx of BMI 37.80, Anxiety, Asthma, GERD, DM2 (on GLP1), who presented to the ED with persistent N/V.    Pt states last week she had N/V/D with mid ABD pain. She presented to the ED with supportive measures and follow up with primary care doctor.  She continued with zofran and PPI therapy with some improvement in symptoms but then relapsed the day she came back to ED.  Her ABD pain near her belly button and does not radiate.  No specific food triggers.  Some diarrhea with initial symptoms but this has resolved with soft brown stool yesterday.  She is having globus sensation and heartburn today.  No black/bloody emesis or stools.  No fever but some chills with initial onset of symptoms.  She takes Mounjaro but has been on a stable dose for the past 2 years as she did not tolerate increased dose.  She smokes cannabis multiple times for day she has also done for years.  Her boyfriend had similar symptoms but is now improved and her symptoms have persisted.  No recent ABX use or travel outside the [place].  No chest pain, SOB or unexpected weight loss.    Pertinent Family Hx:  - No known history of esophageal, gastric or colon cancers  - No known IBD  - No known liver pathologies    Endoscopy Hx:  - No prior EGD/CLN    Social Hx:  - No tobacco use  - No ETOH  - + cannabis.  Denies illicit drug use  - Takes NSAID as needed, took 2-3 times in the past 2 weeks for headaches  - Lives with boyfriend and dad  - Occupation: works as fraud rep for [employer]     History:  Past Medical History:     Allergic rhinitis    Anesthesia complication    sedation didnt work. remembered entire procedure.    Anxiety    It just happens from time to time    Asthma (HCC)    in high school    Esophageal reflux    Learning disability    Obesity    Reflux    Type II or unspecified type diabetes mellitus without mention of complication, not stated as uncontrolled    Wears glasses     Past Surgical History:   Procedure Laterality Date    Ankle scope,extens debridemnt Right 9/16/2014    Procedure: ARTHROSCOPY ANKLE WITH DEBRIDEMENT;  Surgeon: Noah Morgan MD;  Location: [facility]    Gastrocnemius recession Right 9/16/2014    Procedure: GASTROC RECESSION FOOT;  Surgeon: Noah Morgan MD;  Location: [facility]    Hc implant ear tubes  1990    Molecular extraction (m11e.1)  2002    wisdom teeth.    Other surgical history      wisdom teeth removed    Other surgical history      bilateral tubes in ears    Other surgical history      R annkle surgery     Family History   Problem Relation Age of Onset    Diabetes Father 40    Hypertension Father     Diabetes Mother 40    Seizure Disorder Brother     Cancer Maternal Grandmother         unknown    Cancer Paternal Uncle         lung, smoker      reports that she quit smoking about 22 years ago. Her smoking use included cigarettes. She has never used smokeless tobacco. She reports current alcohol use. She reports current drug use. Frequency: 7.00 times per week. Drug: Cannabis.    Allergies:  Allergies[1]    Medications:    Current Facility-Administered Medications:     potassium chloride (Klor-Con) 20 MEQ oral powder 40 mEq, 40 mEq, Oral, Q4H    ondansetron (Zofran) 4 MG/2ML injection 4 mg, 4 mg, Intravenous, Q6H PRN    acetaminophen (Tylenol) tab 650 mg, 650 mg, Oral, Q6H PRN    insulin aspart (NovoLOG) 100 Units/mL FlexPen 1-5 Units, 1-5 Units, Subcutaneous, TID CC    metoclopramide (Reglan) 5 mg/mL injection 10 mg, 10 mg, Intravenous, Q6H PRN    buPROPion ER  (Wellbutrin XL) 24 hr tab 150 mg, 150 mg, Oral, Daily    venlafaxine ER (Effexor-XR) 24 hr cap 75 mg, 75 mg, Oral, Daily with breakfast    pantoprazole (Protonix) DR tab 40 mg, 40 mg, Oral, QAM AC    ALPRAZolam (Xanax) tab 0.25 mg, 0.25 mg, Oral, Nightly PRN    metFORMIN (Glucophage) tab 1,000 mg, 1,000 mg, Oral, BID with meals    atorvastatin (Lipitor) tab 40 mg, 40 mg, Oral, Nightly    lisinopril (Prinivil; Zestril) tab 2.5 mg, 2.5 mg, Oral, Daily    dextrose 5%-sodium chloride 0.9% infusion, , Intravenous, Continuous    Review of Systems:   CONSTITUTIONAL:  negative for fevers, + chills, negative unintentional weight loss   EYES:  negative for diplopia or change in vision   RESPIRATORY:  negative for severe shortness of breath  CARDIOVASCULAR:  negative for crushing sub-sternal chest pain  GASTROINTESTINAL:  see HPI  GENITOURINARY:  negative for dysuria or gross hematuria  INTEGUMENT/BREAST:  SKIN:  negative for jaundice or new rash   ALLERGIC/IMMUNOLOGIC:  negative for hay fever   ENDOCRINE:  negative for cold intolerance and heat intolerance  MUSCULOSKELETAL:  negative for joint effusion/severe erythema  NEURO: negative for new loss of consciousness or dizziness   BEHAVIOR/PSYCH:  negative for psychotic behavior    Physical Exam:    Blood pressure 135/78, pulse 86, temperature 99 °F (37.2 °C), temperature source Oral, resp. rate 18, height 5' 4\" (1.626 m), weight 220 lb 3.2 oz (99.9 kg), last menstrual period 10/27/2024, SpO2 96%, not currently breastfeeding. Body mass index is 37.8 kg/m².    Gen: awake, alert patient, NAD  HEENT: EOMI, the sclera appears anicteric, oropharynx clear, mucus membranes appear moist  CV: RRR  Lung: no conversational dyspnea   Abdomen: soft NTND abdomen with NABS appreciated   Back: No CVA tenderness   Skin: dry, warm, no jaundice  Ext: no LE edema is evident  Neuro: Alert, oriented x3 and interactive  Psych: calm, cooperative    Laboratory Data:  Lab Results   Component Value Date     WBC 7.6 11/20/2024    HGB 12.2 11/20/2024    HCT 37.4 11/20/2024    .0 11/20/2024    CREATSERUM 0.50 11/20/2024    BUN 7 11/20/2024     11/20/2024    K 3.0 11/20/2024     11/20/2024    CO2 24.0 11/20/2024     11/20/2024    CA 8.5 11/20/2024    ALB 4.6 11/19/2024    ALKPHO 56 11/19/2024    BILT 0.5 11/19/2024    TP 7.1 11/19/2024    AST 16 11/19/2024    ALT 19 11/19/2024    LIP 36 11/19/2024    MG 2.0 11/20/2024       Imaging:  CT ABDOMEN+PELVIS(CONTRAST ONLY)(CPT=74177)    Result Date: 11/19/2024  CONCLUSION: Slight concentric mural thickening of nondilated loops of jejunum.  The findings are not associated with any other abnormality and are nonspecific but could represent a very mild enteritis.  There is no CT evidence of bowel obstruction ischemia, perforation, or abscess formation.  There are no other potentially acute appearing abnormalities clearly seen    Dictated by (CST): Brody Sauer MD on 11/19/2024 at 8:52 PM     Finalized by (CST): Brody Sauer MD on 11/19/2024 at 8:56 PM           Assessment & Plan   Anjana Raymundo is a 39 year old female w/ PMHx of BMI 37.80, Anxiety, Asthma, GERD, DM2 (on GLP1), who presented to the ED with persistent N/V.    #N/V  -Labs on admission with with normal chemistry and LFTs.  Lipase 36.  No luekocytosis or anemia. A1C 6.1  -CT A/P with nonspecific slight concentric mural thickening of nondilated loops of jejunum.  No evidence of bowel obstruction, ischemia, perforation or abscess formation.  -No prior EGD/CLN  -Hx of DM2 on GLP1. Smokes cannabis multiple times per day.  Pt notes sick contact with boyfriend experiencing GI upset with similar timing.  Pt's initial diarrhea has since resolved.  -Etiology possible infectious vs inflammatory source, gastroparesis, post viral IBS, cannabinoid associated hyperemesis  -Recommend EGD to further evaluate.  Risks/benefits of procedure discussed.  Pt states understanding and is willing to  proceed.  Will increase PPI to BID.  Counseled on avoiding cannabis.  Consider gastric emptying study if symptoms persist and EGD unrevealing.    EGD consent: I have discussed the risks, benefits, and alternatives to upper endoscopy/enteroscopy with the patient/primary decision maker [who demonstrated understanding], including but not limited to the risks of bleeding, infection, pain, death, as well as the risks of anesthesia and perforation all leading to prolonged hospitalization, surgical intervention, or even death. I also specifically mentioned the miss rate of upper endoscopy of 5-10% in the best of all circumstances.  The patient has agreed to sign an informed consent and elected to proceed with procedure with possible intervention [i.e. polypectomy, stent placement, etc.] as indicated.     Recommend:  -EGD  -NPO  -PPI BID  -Cannabis use cessation  -Stool studies if diarrhea recurs    Thank you for the opportunity to participate in the care of this patient.    Case discussed with Quin Bernal MD and Susana RODRIGUEZ.    Cathi Hurley DNP, FNP-BC  [facility]  11/20/2024          [1]   Allergies  Allergen Reactions    Codeine OTHER (SEE COMMENTS)     Sleep walks, hallucinations    Pineapple TONGUE SWELLING    Vicodin [Hydrocodone-Acetaminophen] NAUSEA AND VOMITING

## 2024-11-20 NOTE — PLAN OF CARE
Patient received in bed from EGD procedure. Alert x 4. Vital signs taken and stable. Patient made comfortable in the bed. No complaints of pain or discomfort. Call light within reach at all times. Intravenous fluids infusing and tolerated.

## 2024-11-20 NOTE — PROGRESS NOTES
[facility]   Pt alert and oriented. Pt reports a hx of anxiety with an increase in her symptoms due to current medical condition and work related stressors. Mood has been stable. Patient reports current psych meds prescribed by PCP; Wellbutrin  mg  and Effexor 75 mg. Pt reports managing increased anxiety effectively without any safety concerns or SI. Hx of individual counseling, none currently. Pt open to receiving referrals for Psychiatry and Therapy. [facility]'s [facility] upon discharge.     Pt declined additional referrals and had no further questions/concerns at this time.    Judie SULLIVAN, CHEL  [phone]

## 2024-11-20 NOTE — ED PROVIDER NOTES
Patient Seen in: [facility] Emergency Department    History     Chief Complaint   Patient presents with    Abdominal Pain       HPI    39-year-old female presents to the ED for evaluation of abdominal pain with nausea and vomiting.  Patient states that she has been here multiple times for similar symptoms.  She has had recurrence of nausea and vomiting over the past few days and talk to her primary care doctor who wants her admitted.  She denies any fever or chills.  No diarrhea or constipation.    History from Independent Source:       External Records Reviewed: Reviewed prior records available in EMR.  Patient had negative CT scan and labs are relatively unremarkable on 3 November as well as on 12 November.    History reviewed.   Past Medical History:    Allergic rhinitis    Anesthesia complication    sedation didnt work. remembered entire procedure.    Anxiety    It just happens from time to time    Asthma (HCC)    in high school    Esophageal reflux    Learning disability    Obesity    Reflux    Type II or unspecified type diabetes mellitus without mention of complication, not stated as uncontrolled    Wears glasses       History reviewed.   Past Surgical History:   Procedure Laterality Date    Ankle scope,extens debridemnt Right 9/16/2014    Procedure: ARTHROSCOPY ANKLE WITH DEBRIDEMENT;  Surgeon: Noah Morgan MD;  Location: [facility]    Gastrocnemius recession Right 9/16/2014    Procedure: GASTROC RECESSION FOOT;  Surgeon: Noah Morgan MD;  Location: [facility]    Hc implant ear tubes  1990    Molecular extraction (m11e.1)  2002    wisdom teeth.    Other surgical history      wisdom teeth removed    Other surgical history      bilateral tubes in ears    Other surgical history      R annkle surgery         Medications :  Prescriptions Prior to Admission[1]     Family History   Problem Relation Age of Onset    Diabetes Father 40    Hypertension Father     Diabetes Mother 40    Seizure  Disorder Brother     Cancer Maternal Grandmother         unknown    Cancer Paternal Uncle         lung, smoker       Smoking Status:   Social History     Socioeconomic History    Marital status: Life Partner   Tobacco Use    Smoking status: Former     Current packs/day: 0.00     Types: Cigarettes     Quit date: 3/25/2002     Years since quittin.6    Smokeless tobacco: Never    Tobacco comments:     0.5 pack q 2 wks   Vaping Use    Vaping status: Never Used   Substance and Sexual Activity    Alcohol use: Yes     Comment: Maybe 2 times a month    Drug use: Yes     Frequency: 7.0 times per week     Types: Cannabis   Other Topics Concern    Caffeine Concern Yes     Comment: Coffee - 1 cup per day        Constitutional and vital signs reviewed.      Social History and Family History elements reviewed from today, pertinent positives to the presenting problem noted.    Physical Exam     ED Triage Vitals [24 1845]   BP (!) 164/78   Pulse 88   Resp 22   Temp 98.7 °F (37.1 °C)   Temp src Oral   SpO2 100 %   O2 Device None (Room air)       Physical Exam   Constitutional: AAOx3, well nourished, obese, somewhat uncomfortable appearing  HEENT: Normocephalic, PERRLA, MMM  CV: s1s2+, RRR, no m/r/g, normal distal pulses  Pulmonary/Chest: CTA b/l with no rales, wheezes.  No chest wall tenderness  Abdominal: Mild epigastric tenderness with no rebound or guarding nondistended. Soft. Bowel sounds are normal.   Neck/Back:   :   Musculoskeletal: Normal range of motion. No deformity.   Neurological: Awake, alert. Normal reflexes. No cranial nerve deficit.    Skin: Skin is warm and dry. No rash noted. No erythema.   Psychiatric:      All measures to prevent infection transmission during my interaction with the patient were taken. The patient was already wearing a droplet mask on my arrival to the room. Personal protective equipment was worn throughout the duration of the exam.      ED Course        Labs Reviewed   COMP  METABOLIC PANEL (14) - Abnormal; Notable for the following components:       Result Value    Glucose 149 (*)     All other components within normal limits   URINALYSIS WITH CULTURE REFLEX - Abnormal; Notable for the following components:    Spec Gravity >1.030 (*)     Glucose Urine >1000 (*)     Ketones Urine >150 (*)     Blood Urine 3+ (*)     Protein Urine 20 (*)     RBC Urine >10 (*)     Squamous Epi. Cells Few (*)     All other components within normal limits   MAGNESIUM - Abnormal; Notable for the following components:    Magnesium 1.5 (*)     All other components within normal limits   LIPASE - Normal   CBC WITH DIFFERENTIAL WITH PLATELET     My Independent Interpretation of EKG (if performed):     Monitor Interpretation:   normal sinus rhythm as interpreted by me.      Imaging Results Available and Reviewed while in ED: CT ABDOMEN+PELVIS(CONTRAST ONLY)(CPT=74177)    Result Date: 11/19/2024  CONCLUSION: Slight concentric mural thickening of nondilated loops of jejunum.  The findings are not associated with any other abnormality and are nonspecific but could represent a very mild enteritis.  There is no CT evidence of bowel obstruction ischemia, perforation, or abscess formation.  There are no other potentially acute appearing abnormalities clearly seen    Dictated by (CST): Brody Sauer MD on 11/19/2024 at 8:52 PM     Finalized by (CST): Brody Sauer MD on 11/19/2024 at 8:56 PM         ED Medications Administered:   Medications   sodium chloride 0.9 % IV bolus 1,000 mL (0 mL Intravenous Stopped 11/19/24 2156)   pantoprazole (Protonix) 40 mg in sodium chloride 0.9% PF 10 mL IV push (40 mg Intravenous Given 11/19/24 1937)   metoclopramide (Reglan) 5 mg/mL injection 10 mg (10 mg Intravenous Given 11/19/24 1940)   magnesium sulfate in sterile water for injection 2 g/50mL IVPB premix 2 g (0 g Intravenous Stopped 11/19/24 2156)   iopamidol 76% (ISOVUE-370) injection for power injector (80 mL Intravenous  Given 11/19/24 2049)             MDM     Vitals:    11/19/24 1845 11/19/24 1945   BP: (!) 164/78 109/88   Pulse: 88 84   Resp: 22    Temp: 98.7 °F (37.1 °C)    TempSrc: Oral    SpO2: 100% 100%   Weight: 99.8 kg    Height: 162.6 cm (5' 4\")      *I personally reviewed and interpreted all ED vitals.    Independent Interpretation of Studies: I have independently reviewed CT scan of the abdomen pelvis and there are no significant acute findings    Social Determinants of Health:     Procedures:      Differential/MDM/Shared Decision Making: Differential Diagnosis includes cannabinoid hyperemesis, gastroparesis, medication side effect, gastroenteritis, bowel obstruction, ischemia, others.      The patient already  has a past medical history of Allergic rhinitis (2018), Anesthesia complication, Anxiety (2000), Asthma (HCC), Esophageal reflux (2016), Learning disability, Obesity (1998), Reflux, Type II or unspecified type diabetes mellitus without mention of complication, not stated as uncontrolled (2012), and Wears glasses.  to contribute to the complexity of this ED evaluation.           Medications, Diagnostics, or Disposition considered but not done:      Management of case was discussed with internal medicine and they have accepted patient for admission.  Workup in the ED was significant for low magnesium which has been replaced intravenously.  Patient is doing somewhat better after Reglan and morphine.  Discussed with [facility] who will see for consult.    Critical care time exclusive of procedure time spent on this patient was 35 min for taking history from patient examining patient, medical decision-making, reviewing lab work and radiology studies, explaining results to patient and family, discussing with consultants/admitting physician, and documenting in patient's chart.      Condition upon leaving the department: Stable    Disposition and Plan     Clinical Impression:  1. Intractable vomiting         Disposition:  Admit    Follow-up:  No follow-up provider specified.    Medications Prescribed:  Current Discharge Medication List          Hospital Problems       Present on Admission  Date Reviewed: 11/5/2024            ICD-10-CM Noted POA    * (Principal) Intractable vomiting R11.10 11/19/2024 Unknown               [1] (Not in a hospital admission)

## 2024-11-20 NOTE — ED QUICK NOTES
Orders for admission, patient is aware of plan and ready to go upstairs. Any questions, please call ED RN mone at extension [phone].     Patient Covid vaccination status: Fully vaccinated     COVID Test Ordered in ED: None    COVID Suspicion at Admission: N/A    Running Infusions:  None    Mental Status/LOC at time of transport: axox4    Other pertinent information:   CIWA score: N/A   NIH score:  N/A

## 2024-11-20 NOTE — H&P (VIEW-ONLY)
Is this a shared or split note between Advanced Practice Provider and Physician? Yes      [facility]   Gastroenterology Consultation Note    Anjana Raymundo  Patient Status:    Observation  Date of Admission:         11/19/2024, Hospital day #0  Attending:   Kay Gonzalez MD  PCP:     Gisel Swift DO    Reason for Consultation:  ABD pain, N/V    History of Present Illness:  Anjana Raymundo is a 39 year old female w/ PMHx of BMI 37.80, Anxiety, Asthma, GERD, DM2 (on GLP1), who presented to the ED with persistent N/V.    Pt states last week she had N/V/D with mid ABD pain. She presented to the ED with supportive measures and follow up with primary care doctor.  She continued with zofran and PPI therapy with some improvement in symptoms but then relapsed the day she came back to ED.  Her ABD pain near her belly button and does not radiate.  No specific food triggers.  Some diarrhea with initial symptoms but this has resolved with soft brown stool yesterday.  She is having globus sensation and heartburn today.  No black/bloody emesis or stools.  No fever but some chills with initial onset of symptoms.  She takes Mounjaro but has been on a stable dose for the past 2 years as she did not tolerate increased dose.  She smokes cannabis multiple times for day she has also done for years.  Her boyfriend had similar symptoms but is now improved and her symptoms have persisted.  No recent ABX use or travel outside the [place].  No chest pain, SOB or unexpected weight loss.    Pertinent Family Hx:  - No known history of esophageal, gastric or colon cancers  - No known IBD  - No known liver pathologies    Endoscopy Hx:  - No prior EGD/CLN    Social Hx:  - No tobacco use  - No ETOH  - + cannabis.  Denies illicit drug use  - Takes NSAID as needed, took 2-3 times in the past 2 weeks for headaches  - Lives with boyfriend and dad  - Occupation: works as fraud rep for [employer]     History:  Past Medical History:     Allergic rhinitis    Anesthesia complication    sedation didnt work. remembered entire procedure.    Anxiety    It just happens from time to time    Asthma (HCC)    in high school    Esophageal reflux    Learning disability    Obesity    Reflux    Type II or unspecified type diabetes mellitus without mention of complication, not stated as uncontrolled    Wears glasses     Past Surgical History:   Procedure Laterality Date    Ankle scope,extens debridemnt Right 9/16/2014    Procedure: ARTHROSCOPY ANKLE WITH DEBRIDEMENT;  Surgeon: Noah Morgan MD;  Location: [facility]    Gastrocnemius recession Right 9/16/2014    Procedure: GASTROC RECESSION FOOT;  Surgeon: Noah Morgan MD;  [facility]    Hc implant ear tubes  1990    Molecular extraction (m11e.1)  2002    wisdom teeth.    Other surgical history      wisdom teeth removed    Other surgical history      bilateral tubes in ears    Other surgical history      R annkle surgery     Family History   Problem Relation Age of Onset    Diabetes Father 40    Hypertension Father     Diabetes Mother 40    Seizure Disorder Brother     Cancer Maternal Grandmother         unknown    Cancer Paternal Uncle         lung, smoker      reports that she quit smoking about 22 years ago. Her smoking use included cigarettes. She has never used smokeless tobacco. She reports current alcohol use. She reports current drug use. Frequency: 7.00 times per week. Drug: Cannabis.    Allergies:  Allergies[1]    Medications:    Current Facility-Administered Medications:     potassium chloride (Klor-Con) 20 MEQ oral powder 40 mEq, 40 mEq, Oral, Q4H    ondansetron (Zofran) 4 MG/2ML injection 4 mg, 4 mg, Intravenous, Q6H PRN    acetaminophen (Tylenol) tab 650 mg, 650 mg, Oral, Q6H PRN    insulin aspart (NovoLOG) 100 Units/mL FlexPen 1-5 Units, 1-5 Units, Subcutaneous, TID CC    metoclopramide (Reglan) 5 mg/mL injection 10 mg, 10 mg, Intravenous, Q6H PRN    buPROPion ER  (Wellbutrin XL) 24 hr tab 150 mg, 150 mg, Oral, Daily    venlafaxine ER (Effexor-XR) 24 hr cap 75 mg, 75 mg, Oral, Daily with breakfast    pantoprazole (Protonix) DR tab 40 mg, 40 mg, Oral, QAM AC    ALPRAZolam (Xanax) tab 0.25 mg, 0.25 mg, Oral, Nightly PRN    metFORMIN (Glucophage) tab 1,000 mg, 1,000 mg, Oral, BID with meals    atorvastatin (Lipitor) tab 40 mg, 40 mg, Oral, Nightly    lisinopril (Prinivil; Zestril) tab 2.5 mg, 2.5 mg, Oral, Daily    dextrose 5%-sodium chloride 0.9% infusion, , Intravenous, Continuous    Review of Systems:   CONSTITUTIONAL:  negative for fevers, + chills, negative unintentional weight loss   EYES:  negative for diplopia or change in vision   RESPIRATORY:  negative for severe shortness of breath  CARDIOVASCULAR:  negative for crushing sub-sternal chest pain  GASTROINTESTINAL:  see HPI  GENITOURINARY:  negative for dysuria or gross hematuria  INTEGUMENT/BREAST:  SKIN:  negative for jaundice or new rash   ALLERGIC/IMMUNOLOGIC:  negative for hay fever   ENDOCRINE:  negative for cold intolerance and heat intolerance  MUSCULOSKELETAL:  negative for joint effusion/severe erythema  NEURO: negative for new loss of consciousness or dizziness   BEHAVIOR/PSYCH:  negative for psychotic behavior    Physical Exam:    Blood pressure 135/78, pulse 86, temperature 99 °F (37.2 °C), temperature source Oral, resp. rate 18, height 5' 4\" (1.626 m), weight 220 lb 3.2 oz (99.9 kg), last menstrual period 10/27/2024, SpO2 96%, not currently breastfeeding. Body mass index is 37.8 kg/m².    Gen: awake, alert patient, NAD  HEENT: EOMI, the sclera appears anicteric, oropharynx clear, mucus membranes appear moist  CV: RRR  Lung: no conversational dyspnea   Abdomen: soft NTND abdomen with NABS appreciated   Back: No CVA tenderness   Skin: dry, warm, no jaundice  Ext: no LE edema is evident  Neuro: Alert, oriented x3 and interactive  Psych: calm, cooperative    Laboratory Data:  Lab Results   Component Value Date     WBC 7.6 11/20/2024    HGB 12.2 11/20/2024    HCT 37.4 11/20/2024    .0 11/20/2024    CREATSERUM 0.50 11/20/2024    BUN 7 11/20/2024     11/20/2024    K 3.0 11/20/2024     11/20/2024    CO2 24.0 11/20/2024     11/20/2024    CA 8.5 11/20/2024    ALB 4.6 11/19/2024    ALKPHO 56 11/19/2024    BILT 0.5 11/19/2024    TP 7.1 11/19/2024    AST 16 11/19/2024    ALT 19 11/19/2024    LIP 36 11/19/2024    MG 2.0 11/20/2024       Imaging:  CT ABDOMEN+PELVIS(CONTRAST ONLY)(CPT=74177)    Result Date: 11/19/2024  CONCLUSION: Slight concentric mural thickening of nondilated loops of jejunum.  The findings are not associated with any other abnormality and are nonspecific but could represent a very mild enteritis.  There is no CT evidence of bowel obstruction ischemia, perforation, or abscess formation.  There are no other potentially acute appearing abnormalities clearly seen    Dictated by (CST): Brody Sauer MD on 11/19/2024 at 8:52 PM     Finalized by (CST): Brody Sauer MD on 11/19/2024 at 8:56 PM           Assessment & Plan   Anjana Raymundo is a 39 year old female w/ PMHx of BMI 37.80, Anxiety, Asthma, GERD, DM2 (on GLP1), who presented to the ED with persistent N/V.    #N/V  -Labs on admission with with normal chemistry and LFTs.  Lipase 36.  No luekocytosis or anemia. A1C 6.1  -CT A/P with nonspecific slight concentric mural thickening of nondilated loops of jejunum.  No evidence of bowel obstruction, ischemia, perforation or abscess formation.  -No prior EGD/CLN  -Hx of DM2 on GLP1. Smokes cannabis multiple times per day.  Pt notes sick contact with boyfriend experiencing GI upset with similar timing.  Pt's initial diarrhea has since resolved.  -Etiology possible infectious vs inflammatory source, gastroparesis, post viral IBS, cannabinoid associated hyperemesis  -Recommend EGD to further evaluate.  Risks/benefits of procedure discussed.  Pt states understanding and is willing to  proceed.  Will increase PPI to BID.  Counseled on avoiding cannabis.  Consider gastric emptying study if symptoms persist and EGD unrevealing.    EGD consent: I have discussed the risks, benefits, and alternatives to upper endoscopy/enteroscopy with the patient/primary decision maker [who demonstrated understanding], including but not limited to the risks of bleeding, infection, pain, death, as well as the risks of anesthesia and perforation all leading to prolonged hospitalization, surgical intervention, or even death. I also specifically mentioned the miss rate of upper endoscopy of 5-10% in the best of all circumstances.  The patient has agreed to sign an informed consent and elected to proceed with procedure with possible intervention [i.e. polypectomy, stent placement, etc.] as indicated.     Recommend:  -EGD  -NPO  -PPI BID  -Cannabis use cessation  -Stool studies if diarrhea recurs    Thank you for the opportunity to participate in the care of this patient.    Case discussed with Quin Bernal MD and Susana RODRIGUEZ.    Cathi Hurley DNP, FNP-BC  [facility]  11/20/2024          [1]   Allergies  Allergen Reactions    Codeine OTHER (SEE COMMENTS)     Sleep walks, hallucinations    Pineapple TONGUE SWELLING    Vicodin [Hydrocodone-Acetaminophen] NAUSEA AND VOMITING

## 2024-11-20 NOTE — ANESTHESIA PREPROCEDURE EVALUATION
Anesthesia PreOp Note    HPI:     Anjana Raymundo is a 39 year old female who presents for preoperative consultation requested by: Quin Bernal MD    Date of Surgery: 11/19/2024 - 11/20/2024    Procedure(s):  ESOPHAGOGASTRODUODENOSCOPY (EGD)  Indication: nausea, vomitting    Relevant Problems   No relevant active problems       NPO:  Last Liquid Consumption Date: 11/20/24  Last Liquid Consumption Time: 0000  Last Solid Consumption Date: 11/20/24  Last Solid Consumption Time: 0000  Last Liquid Consumption Date: 11/20/24          History Review:  Patient Active Problem List    Diagnosis Date Noted    Intractable vomiting 11/19/2024    Essential hypertension 02/22/2021    Increased appetite 07/03/2018    Morbid obesity with BMI of 45.0-49.9, adult ([facility]) 05/22/2018    Dyslipidemia 02/20/2018    Epigastric pain 12/08/2014    Type 2 diabetes mellitus without complication, without long-term current use of insulin ([facility]) 08/25/2014    SX/GLOKOBE/  / [facility]/ RIGHT ANKLE SCOPE, DEBRIDEMENT OCD,GASTROC SLIDE, POSS MOD BROSTROM / DOS 9-16-14 / EXP 12-15-14 08/07/2014       Past Medical History:    Allergic rhinitis    Anesthesia complication    sedation didnt work. remembered entire procedure.    Anxiety    It just happens from time to time    Asthma ([facility])    in high school    Esophageal reflux    Learning disability    Obesity    Reflux    Type II or unspecified type diabetes mellitus without mention of complication, not stated as uncontrolled    Wears glasses       Past Surgical History:   Procedure Laterality Date    Ankle scope,extens debridemnt Right 9/16/2014    Procedure: ARTHROSCOPY ANKLE WITH DEBRIDEMENT;  Surgeon: Noah Morgan MD;  Location: [facility]    Gastrocnemius recession Right 9/16/2014    Procedure: GASTROC RECESSION FOOT;  Surgeon: Noah Morgan MD;  Location: [facility]    Hc implant ear tubes  1990    Molecular extraction (m11e.1)  2002    wisdom teeth.    Other surgical  history      wisdom teeth removed    Other surgical history      bilateral tubes in ears    Other surgical history      R annkle surgery       Prescriptions Prior to Admission[1]  Current Medications and Prescriptions Ordered in Epic[2]    Allergies[3]    Family History   Problem Relation Age of Onset    Diabetes Father 40    Hypertension Father     Diabetes Mother 40    Seizure Disorder Brother     Cancer Maternal Grandmother         unknown    Cancer Paternal Uncle         lung, smoker     Social History     Socioeconomic History    Marital status: Life Partner   Tobacco Use    Smoking status: Former     Current packs/day: 0.00     Types: Cigarettes     Quit date: 3/25/2002     Years since quittin.6    Smokeless tobacco: Never    Tobacco comments:     0.5 pack q 2 wks   Vaping Use    Vaping status: Never Used   Substance and Sexual Activity    Alcohol use: Yes     Comment: Maybe 2 times a month    Drug use: Yes     Frequency: 7.0 times per week     Types: Cannabis   Other Topics Concern    Caffeine Concern Yes     Comment: Coffee - 1 cup per day        Available pre-op labs reviewed.  Lab Results   Component Value Date    WBC 7.6 2024    RBC 4.44 2024    HGB 12.2 2024    HCT 37.4 2024    MCV 84.2 2024    MCH 27.5 2024    MCHC 32.6 2024    RDW 13.4 2024    .0 2024    PREGS Negative 2024    HCGQN <2.6 2024    URINEPREG Negative 2024     Lab Results   Component Value Date     2024    K 3.0 (L) 2024     2024    CO2 24.0 2024    BUN 7 (L) 2024    CREATSERUM 0.50 (L) 2024     (H) 2024    PGLU 144 (H) 2024    CA 8.5 (L) 2024     Lab Results   Component Value Date    INR 0.83 2024       Vital Signs:  Body mass index is 37.8 kg/m².   height is 1.626 m (5' 4\") and weight is 99.9 kg (220 lb 3.2 oz). Her oral temperature is 99.1 °F (37.3 °C). Her blood pressure is  117/64 and her pulse is 86. Her respiration is 12 and oxygen saturation is 98%.   Vitals:    11/20/24 0614 11/20/24 1007 11/20/24 1200 11/20/24 1226   BP: 112/68 135/78 135/89 117/64   Pulse: 91 86 92 86   Resp: 18 18 18 12   Temp: 98 °F (36.7 °C) 99 °F (37.2 °C) 99.1 °F (37.3 °C)    TempSrc: Oral Oral Oral    SpO2: 97% 96% 98% 98%   Weight:       Height:            Anesthesia Evaluation      History of anesthetic complications   Airway   Mallampati: II  TM distance: >3 FB  Neck ROM: full  Dental          Pulmonary     breath sounds clear to auscultation  (+) asthma  Cardiovascular   (+) hypertension    Rhythm: regular  Rate: normal    Neuro/Psych    (+)  anxiety/panic attacks,        GI/Hepatic/Renal    (+) GERD    Endo/Other    (+) diabetes mellitus type 2  Abdominal   (+) obese    Abdomen: soft.     Other findings: -ASTHMA  -Lower missing teeth                 Anesthesia Plan:   ASA:  2  Plan:   MAC  Informed Consent Plan and Risks Discussed With:  Patient  Discussed plan with:  CRNA      I have informed Anjana Raymundo and/or legal guardian or family member of the nature of the anesthetic plan, benefits, risks including possible dental damage if relevant, major complications, and any alternative forms of anesthetic management.   All of the patient's questions were answered to the best of my ability. The patient desires the anesthetic management as planned.  Raffi Faria CRNA  11/20/2024 12:41 PM  Present on Admission:  **None**           [1]   Medications Prior to Admission   Medication Sig Dispense Refill Last Dose/Taking    Prenatal Vit-Fe Fumarate-FA (PRENATAL VITAMIN OR) Take 1 tablet by mouth daily.   11/19/2024 at  7:00 AM    dapagliflozin (FARXIGA) 10 MG Oral Tab Take 1 tablet (10 mg total) by mouth daily. 90 tablet 1 11/18/2024 at  7:00 PM    pantoprazole 40 MG Oral Tab EC Please take 1 tablet 30 minutes before breakfast and 30 minutes before dinner for the next 14 days. 30 tablet 0 11/19/2024 at   7:00 AM    atorvastatin 40 MG Oral Tab Take 1 tablet (40 mg total) by mouth nightly. 90 tablet 1 11/18/2024 at  7:00 PM    buPROPion  MG Oral Tablet 24 Hr Take 1 tablet (150 mg total) by mouth daily. 90 tablet 3 11/20/2024    metFORMIN HCl 1000 MG Oral Tab TAKE 1 TABLET BY MOUTH TWICE A  tablet 1 11/19/2024 at  7:00 AM    Tirzepatide (MOUNJARO) 5 MG/0.5ML Subcutaneous Solution Pen-injector Inject 5 mg into the skin once a week. 6 mL 1 11/18/2024 at  8:30 PM    glimepiride 4 MG Oral Tab Take 2 tablets (8 mg total) by mouth before breakfast. (Patient taking differently: Take 1 tablet (4 mg total) by mouth before breakfast.) 180 tablet 1 11/19/2024 at  7:00 AM    lisinopril 2.5 MG Oral Tab TAKE 1 TABLET BY MOUTH EVERY DAY 90 tablet 3 11/18/2024 at  7:00 PM    venlafaxine ER 75 MG Oral Capsule SR 24 Hr TAKE 1 CAPSULE BY MOUTH EVERY DAY 90 capsule 3 11/20/2024    omeprazole 20 MG Oral Capsule Delayed Release Take 1 capsule (20 mg total) by mouth daily. 90 capsule 3 11/19/2024 at  7:00 AM    aspirin 81 MG Oral Tab Take 1 tablet (81 mg total) by mouth daily.   11/18/2024 at  7:00 PM    Glucose Blood (ONETOUCH VERIO) In Vitro Strip Use to check blood sugar daily. 100 strip 2     ALPRAZolam (XANAX) 0.25 MG Oral Tab Take 1 tablet (0.25 mg total) by mouth nightly as needed. 30 tablet 0 More than a month    Meloxicam 7.5 MG Oral Tab Take 1 tablet (7.5 mg total) by mouth daily. (Patient taking differently: Take 1 tablet (7.5 mg total) by mouth daily as needed.) 30 tablet 1    [2]   Current Facility-Administered Medications Ordered in Epic   Medication Dose Route Frequency Provider Last Rate Last Admin    potassium chloride (Klor-Con) 20 MEQ oral powder 40 mEq  40 mEq Oral Q4H Kay Gonzalez MD   40 mEq at 11/20/24 1009    pantoprazole (Protonix) DR tab 40 mg  40 mg Oral BID AC Cathi Hurley APRN        ondansetron (Zofran) 4 MG/2ML injection 4 mg  4 mg Intravenous Q6H PRN Kay Gonzalez MD   4 mg at 11/20/24  0040    acetaminophen (Tylenol) tab 650 mg  650 mg Oral Q6H PRN Kay Gonzalez MD   650 mg at 11/20/24 0039    insulin aspart (NovoLOG) 100 Units/mL FlexPen 1-5 Units  1-5 Units Subcutaneous TID CC Kay Gonzalez MD        metoclopramide (Reglan) 5 mg/mL injection 10 mg  10 mg Intravenous Q6H PRN Kay Gonzalez MD        buPROPion ER (Wellbutrin XL) 24 hr tab 150 mg  150 mg Oral Daily Kay Gonzalez MD   150 mg at 11/20/24 1009    venlafaxine ER (Effexor-XR) 24 hr cap 75 mg  75 mg Oral Daily with breakfast Kay Gonzalez MD   75 mg at 11/20/24 1011    ALPRAZolam (Xanax) tab 0.25 mg  0.25 mg Oral Nightly PRN Kay Gonzalez MD        metFORMIN (Glucophage) tab 1,000 mg  1,000 mg Oral BID with meals Kay Gonzalez MD        atorvastatin (Lipitor) tab 40 mg  40 mg Oral Nightly Kay Gonzalez MD        lisinopril (Prinivil; Zestril) tab 2.5 mg  2.5 mg Oral Daily Kay Gonzalez MD   2.5 mg at 11/20/24 1010    dextrose 5%-sodium chloride 0.9% infusion   Intravenous Continuous Kay Gonzalez  mL/hr at 11/20/24 1008 New Bag at 11/20/24 1008     No current Epic-ordered outpatient medications on file.   [3]   Allergies  Allergen Reactions    Codeine OTHER (SEE COMMENTS)     Sleep walks, hallucinations    Pineapple TONGUE SWELLING    Vicodin [Hydrocodone-Acetaminophen] NAUSEA AND VOMITING

## 2024-11-20 NOTE — OPERATIVE REPORT
ESOPHAGOGASTRODUODENOSCOPY (EGD) REPORT    Anjana Raymundo     3/30/1985 Age 39 year old   PCP Gisel Swift DO Endoscopist Quin Bernal MD     Date of procedure: 24    Procedure: EGD w/biopsy    Pre-operative diagnosis: n/v    Post-operative diagnosis: see impression    Medications: MAC    Complications: none    Procedure:  Informed consent was obtained from the patient after the risks of the procedure were discussed, including but not limited to bleeding, perforation, aspiration, infection, or possibility of a missed lesion. After discussions of the risks/benefits and alternatives to this procedure, as well as the planned sedation, the patient was placed in the left lateral decubitus position and begun on continuous blood pressure pulse oximetry and EKG monitoring and this was maintained throughout the procedure. Once an adequate level of sedation was obtained a bite block was placed. Then the lubricated tip of the [id number] diagnostic video upper endoscope was inserted and advanced using direct visualization into the posterior pharynx and ultimately into the esophagus, stomach, and duodenum.    Complications: None    Findings:      1. Esophagus: normal esophagus     2. Stomach: We then entered the stomach. Gastric mucosa appeared normal in the forward view with no evidence of erythema, erosions, or ulcerations. There was no evidence of any luminal or submucosal masses. A retroflexed view allowed examination of the angularis, cardia and fundus and these areas also appeared normal with a non-patulous cardia. No hiatal hernia seen.  Random biopsies of the stomach (body, antrum, cardia, and incisura) were taken with cold forceps for histology.     3. Duodenum: The duodenal mucosa appeared normal in the 1st and 2nd portion of the duodenum.     We then withdrew the instrument from the patient who tolerated the procedure well.     Impression:   1. Normal EGD    Recommend:  1. Follow-up pathology  2. Continue  ppi  3. May resume diet    >>>If tissue was sampled/removed and you have not received your pathology results either by phone or letter within 2 weeks, please call our office at [phone].    Specimens:  Gastric      Blood loss: <1 ml      Quin Bernal MD  [facility]

## 2024-11-20 NOTE — INTERVAL H&P NOTE
Pre-op Diagnosis: nausea, vomitting    The above referenced H&P was reviewed by Quin Lemons Ma, MD on 11/20/2024, the patient was examined and no significant changes have occurred in the patient's condition since the H&P was performed.  I discussed with the patient and/or legal representative the potential benefits, risks and side effects of this procedure; the likelihood of the patient achieving goals; and potential problems that might occur during recuperation.  I discussed reasonable alternatives to the procedure, including risks, benefits and side effects related to the alternatives and risks related to not receiving this procedure.  We will proceed with procedure as planned.

## 2024-11-20 NOTE — PLAN OF CARE
Fall precautions and call light in reach.    Problem: Patient Centered Care  Goal: Patient preferences are identified and integrated in the patient's plan of care  Description: Interventions:  - What would you like us to know as we care for you? Home with boyfriend and dad  - Provide timely, complete, and accurate information to patient/family  - Incorporate patient and family knowledge, values, beliefs, and cultural backgrounds into the planning and delivery of care  - Encourage patient/family to participate in care and decision-making at the level they choose  - Honor patient and family perspectives and choices  Outcome: Progressing     Problem: Diabetes/Glucose Control  Goal: Glucose maintained within prescribed range  Description: INTERVENTIONS:  - Monitor Blood Glucose as ordered  - Assess for signs and symptoms of hyperglycemia and hypoglycemia  - Administer ordered medications to maintain glucose within target range  - Assess barriers to adequate nutritional intake and initiate nutrition consult as needed  - Instruct patient on self management of diabetes  Outcome: Progressing     Problem: Patient/Family Goals  Goal: Patient/Family Long Term Goal  Description: Patient's Long Term Goal: free of nausea     Interventions:  - administer zofran  - See additional Care Plan goals for specific interventions  Outcome: Progressing  Goal: Patient/Family Short Term Goal  Description: Patient's Short Term Goal: free of falls    Interventions:   - fall precautions  - See additional Care Plan goals for specific interventions  Outcome: Progressing     Problem: GASTROINTESTINAL - ADULT  Goal: Minimal or absence of nausea and vomiting  Description: INTERVENTIONS:  - Maintain adequate hydration with IV or PO as ordered and tolerated  - Nasogastric tube to low intermittent suction as ordered  - Evaluate effectiveness of ordered antiemetic medications  - Provide nonpharmacologic comfort measures as appropriate  - Advance diet as  tolerated, if ordered  - Obtain nutritional consult as needed  - Evaluate fluid balance  Outcome: Progressing  Goal: Maintains or returns to baseline bowel function  Description: INTERVENTIONS:  - Assess bowel function  - Maintain adequate hydration with IV or PO as ordered and tolerated  - Evaluate effectiveness of GI medications  - Encourage mobilization and activity  - Obtain nutritional consult as needed  - Establish a toileting routine/schedule  - Consider collaborating with pharmacy to review patient's medication profile  Outcome: Progressing     Problem: METABOLIC/FLUID AND ELECTROLYTES - ADULT  Goal: Electrolytes maintained within normal limits  Description: INTERVENTIONS:  - Monitor labs and rhythm and assess patient for signs and symptoms of electrolyte imbalances  - Administer electrolyte replacement as ordered  - Monitor response to electrolyte replacements, including rhythm and repeat lab results as appropriate  - Fluid restriction as ordered  - Instruct patient on fluid and nutrition restrictions as appropriate  Outcome: Progressing     Problem: PAIN - ADULT  Goal: Verbalizes/displays adequate comfort level or patient's stated pain goal  Description: INTERVENTIONS:  - Encourage pt to monitor pain and request assistance  - Assess pain using appropriate pain scale  - Administer analgesics based on type and severity of pain and evaluate response  - Implement non-pharmacological measures as appropriate and evaluate response  - Consider cultural and social influences on pain and pain management  - Manage/alleviate anxiety  - Utilize distraction and/or relaxation techniques  - Monitor for opioid side effects  - Notify MD/LIP if interventions unsuccessful or patient reports new pain  - Anticipate increased pain with activity and pre-medicate as appropriate  Outcome: Progressing     Problem: RISK FOR INFECTION - ADULT  Goal: Absence of fever/infection during anticipated neutropenic period  Description:  INTERVENTIONS  - Monitor WBC  - Administer growth factors as ordered  - Implement neutropenic guidelines  Outcome: Progressing     Problem: SAFETY ADULT - FALL  Goal: Free from fall injury  Description: INTERVENTIONS:  - Assess pt frequently for physical needs  - Identify cognitive and physical deficits and behaviors that affect risk of falls.  - [place] fall precautions as indicated by assessment.  - Educate pt/family on patient safety including physical limitations  - Instruct pt to call for assistance with activity based on assessment  - Modify environment to reduce risk of injury  - Provide assistive devices as appropriate  - Consider OT/PT consult to assist with strengthening/mobility  - Encourage toileting schedule  Outcome: Progressing     Problem: DISCHARGE PLANNING  Goal: Discharge to home or other facility with appropriate resources  Description: INTERVENTIONS:  - Identify barriers to discharge w/pt and caregiver  - Include patient/family/discharge partner in discharge planning  - Arrange for needed discharge resources and transportation as appropriate  - Identify discharge learning needs (meds, wound care, etc)  - Arrange for interpreters to assist at discharge as needed  - Consider post-discharge preferences of patient/family/discharge partner  - Complete POLST form as appropriate  - Assess patient's ability to be responsible for managing their own health  - Refer to Case Management Department for coordinating discharge planning if the patient needs post-hospital services based on physician/LIP order or complex needs related to functional status, cognitive ability or social support system  Outcome: Progressing

## 2024-11-20 NOTE — PLAN OF CARE
Problem: Patient Centered Care  Goal: Patient preferences are identified and integrated in the patient's plan of care  Description: Interventions:  - What would you like us to know as we care for you?   - Provide timely, complete, and accurate information to patient/family  - Incorporate patient and family knowledge, values, beliefs, and cultural backgrounds into the planning and delivery of care  - Encourage patient/family to participate in care and decision-making at the level they choose  - Honor patient and family perspectives and choices  Outcome: Progressing     Problem: Diabetes/Glucose Control  Goal: Glucose maintained within prescribed range  Description: INTERVENTIONS:  - Monitor Blood Glucose as ordered  - Assess for signs and symptoms of hyperglycemia and hypoglycemia  - Administer ordered medications to maintain glucose within target range  - Assess barriers to adequate nutritional intake and initiate nutrition consult as needed  - Instruct patient on self management of diabetes  Outcome: Progressing     Problem: Patient/Family Goals  Goal: Patient/Family Long Term Goal  Description: Patient's Long Term Goal: Intractable vomiting resolved.    Interventions:  - Patient on regular diet and tolerates it well.  - Intake and output is monitored.  - Intravenous fluids infusing and tolerated.   - See additional Care Plan goals for specific interventions  Outcome: Progressing  Goal: Patient/Family Short Term Goal  Description: Patient's Short Term Goal: Pain resolve.    Interventions:   - Pain level is assess using pain scale from to 10.  - Medicated as needed for pain or discomfort.  - See additional Care Plan goals for specific interventions  Outcome: Progressing     Problem: GASTROINTESTINAL - ADULT  Goal: Minimal or absence of nausea and vomiting  Description: INTERVENTIONS:  - Maintain adequate hydration with IV or PO as ordered and tolerated  - Nasogastric tube to low intermittent suction as ordered  -  Evaluate effectiveness of ordered antiemetic medications  - Provide nonpharmacologic comfort measures as appropriate  - Advance diet as tolerated, if ordered  - Obtain nutritional consult as needed  - Evaluate fluid balance  Outcome: Progressing  Goal: Maintains or returns to baseline bowel function  Description: INTERVENTIONS:  - Assess bowel function  - Maintain adequate hydration with IV or PO as ordered and tolerated  - Evaluate effectiveness of GI medications  - Encourage mobilization and activity  - Obtain nutritional consult as needed  - Establish a toileting routine/schedule  - Consider collaborating with pharmacy to review patient's medication profile  Outcome: Progressing     Problem: METABOLIC/FLUID AND ELECTROLYTES - ADULT  Goal: Electrolytes maintained within normal limits  Description: INTERVENTIONS:  - Monitor labs and rhythm and assess patient for signs and symptoms of electrolyte imbalances  - Administer electrolyte replacement as ordered  - Monitor response to electrolyte replacements, including rhythm and repeat lab results as appropriate  - Fluid restriction as ordered  - Instruct patient on fluid and nutrition restrictions as appropriate  Outcome: Progressing     Problem: PAIN - ADULT  Goal: Verbalizes/displays adequate comfort level or patient's stated pain goal  Description: INTERVENTIONS:  - Encourage pt to monitor pain and request assistance  - Assess pain using appropriate pain scale  - Administer analgesics based on type and severity of pain and evaluate response  - Implement non-pharmacological measures as appropriate and evaluate response  - Consider cultural and social influences on pain and pain management  - Manage/alleviate anxiety  - Utilize distraction and/or relaxation techniques  - Monitor for opioid side effects  - Notify MD/LIP if interventions unsuccessful or patient reports new pain  - Anticipate increased pain with activity and pre-medicate as appropriate  Outcome:  Progressing     Problem: RISK FOR INFECTION - ADULT  Goal: Absence of fever/infection during anticipated neutropenic period  Description: INTERVENTIONS  - Monitor WBC  - Administer growth factors as ordered  - Implement neutropenic guidelines  Outcome: Progressing     Problem: SAFETY ADULT - FALL  Goal: Free from fall injury  Description: INTERVENTIONS:  - Assess pt frequently for physical needs  - Identify cognitive and physical deficits and behaviors that affect risk of falls.  - [place] fall precautions as indicated by assessment.  - Educate pt/family on patient safety including physical limitations  - Instruct pt to call for assistance with activity based on assessment  - Modify environment to reduce risk of injury  - Provide assistive devices as appropriate  - Consider OT/PT consult to assist with strengthening/mobility  - Encourage toileting schedule  Outcome: Progressing     Problem: DISCHARGE PLANNING  Goal: Discharge to home or other facility with appropriate resources  Description: INTERVENTIONS:  - Identify barriers to discharge w/pt and caregiver  - Include patient/family/discharge partner in discharge planning  - Arrange for needed discharge resources and transportation as appropriate  - Identify discharge learning needs (meds, wound care, etc)  - Arrange for interpreters to assist at discharge as needed  - Consider post-discharge preferences of patient/family/discharge partner  - Complete POLST form as appropriate  - Assess patient's ability to be responsible for managing their own health  - Refer to Case Management Department for coordinating discharge planning if the patient needs post-hospital services based on physician/LIP order or complex needs related to functional status, cognitive ability or social support system  Outcome: Progressing     Patient is presently resting in the bed. Alert x 4. Vital signs taken and stable. Patient had EGD done today. No complaints of pain or discomfort.  Intravenous fluids infusing and tolerated. Patient tolerated regular diet without any nausea or vomiting. Self care and independent. Call light within reach at all times.

## 2024-11-20 NOTE — TELEPHONE ENCOUNTER
Received a fax of patients most recent eye exam dated on 11/14/2024 with Roma Thomas MD at [facility]. Eye exam was documented in patient's 'Diabetes Flowsheet' and placed in providers folder for review.

## 2024-11-20 NOTE — H&P
[facility]  part of formerly [facility]    History and Physical    Anjana Raymundo Patient Status:  Observation    3/30/1985 MRN [id number]   Location [facility] 5SW/SE Attending Kay Gonzalez MD   Hosp Day # 0 STEPHANIE Swift DO     Date:  2024  Date of Admission:  2024    History provided by:patient  HPI:     Chief Complaint   Patient presents with    Abdominal Pain     HPI    The patient is a 39-year-old female with a past medical history of type 2 diabetes mellitus, GERD on chronic omeprazole admitted with intractable nausea and vomiting.  The patient states that she initially developed acute onset nausea and vomiting on the night of 24.  She presented to the ED the following day where her WBC was slightly elevated at 11.2.  Chest x-ray and CT abdomen and pelvis were normal.  She was given a GI cocktail and Zofran with improvement in her symptoms.  She was discharged home on pantoprazole which she took in addition to her omeprazole.  She followed up in the office with Dr. Swift on 2024.  At that time she was continuing to have episodic vomiting, mainly in the morning.  She would generally just vomit up bile.  She was given a prescription for Zofran.  Over the course of the next week, however, she continued to vomit almost daily.  This did not necessarily seem to be associated with eating.  Most commonly she would vomit in the morning although she would also occasionally vomit at work.  She felt that the Zofran actually may have exacerbated her symptoms.      Patient continued to vomit mostly bile over the next week and again presented to the ED.  At that time her potassium was slightly low.  Her potassium was replaced and she was sent with another prescription for Zofran, although she did not feel that this helped.  Over the following week, she continued to have daily vomiting.  On the morning prior to admission, she finally vomited up food and states that it  appeared red, possibly like blood, although she had eaten some peanuts with chili pepper coating.   She called Dr. Swift who directed her to the ED.    In the ED, CBC was again normal.  Repeat CT revealed slight concentric mural thickening of nondilated loops of jejunum.  The findings were not associated with any other abnormality and were nonspecific but could represent a very mild enteritis.  There was no CT evidence of bowel obstruction, ischemia, perforation or abscess formation.    Given her ongoing symptoms, the patient was admitted for further evaluation.    On review of systems, the pt did have diarrhea x 1 episode when the vomiting first started on 11/2 but denies any diarrhea since that time.  Her boyfriend had a diarrheal illness (without vomiting) around the time that the patient's symptoms first began, but his symptoms have since resolved.  She does note some periumbilical discomfort but is unsure if that is from vomiting.  She denies any recent travel.  She denies any change in medication.  Initially the possibility of Mounjaro contributing to her symptoms was entertained.  However her symptoms did not seem to correlate with when she took the Mounjaro each week, and she has been on this medication for the past 2 years.  She is currently on 5 mg weekly.  She had previously taken 7.5 mg weekly but this seemed to suppress her appetite too much so she was decreased back to 5 mg.  She did take Advil 400 mg approximately 3 times since the onset of vomiting (for headache) but was not taking NSAIDs prior to that.    History     Past Medical History:    Allergic rhinitis    Anesthesia complication    sedation didnt work. remembered entire procedure.    Anxiety    It just happens from time to time    Asthma (HCC)    in high school    Esophageal reflux    Learning disability    Obesity    Reflux    Type II or unspecified type diabetes mellitus without mention of complication, not stated as uncontrolled    Wears  glasses     Past Surgical History:   Procedure Laterality Date    Ankle scope,extens debridemnt Right 2014    Procedure: ARTHROSCOPY ANKLE WITH DEBRIDEMENT;  Surgeon: Noah Morgan MD;  Location: [facility]    Gastrocnemius recession Right 2014    Procedure: GASTROC RECESSION FOOT;  Surgeon: Noah Morgan MD;  Location: [facility]    Hc implant ear tubes      Molecular extraction (m11e.1)      wisdom teeth.    Other surgical history      wisdom teeth removed    Other surgical history      bilateral tubes in ears    Other surgical history      R annkle surgery     Family History   Problem Relation Age of Onset    Diabetes Father 40    Hypertension Father     Diabetes Mother 40    Seizure Disorder Brother     Cancer Maternal Grandmother         unknown    Cancer Paternal Uncle         lung, smoker     Social History:  Social History     Socioeconomic History    Marital status: Life Partner   Tobacco Use    Smoking status: Former     Current packs/day: 0.00     Types: Cigarettes     Quit date: 3/25/2002     Years since quittin.6    Smokeless tobacco: Never    Tobacco comments:     0.5 pack q 2 wks   Vaping Use    Vaping status: Never Used   Substance and Sexual Activity    Alcohol use: Yes     Comment: Maybe 2 times a month    Drug use: Yes     Frequency: 7.0 times per week     Types: Cannabis   Other Topics Concern    Caffeine Concern Yes     Comment: Coffee - 1 cup per day      Social Drivers of Health     Food Insecurity: No Food Insecurity (2024)    Food Insecurity     Food Insecurity: Never true   Transportation Needs: No Transportation Needs (2024)    Transportation Needs     Lack of Transportation: No   Housing Stability: Low Risk  (2024)    Housing Stability     Housing Instability: No     Allergies/Medications:   Allergies: Allergies[1]  Medications Prior to Admission   Medication Sig    Prenatal Vit-Fe Fumarate-FA (PRENATAL VITAMIN OR) Take 1 tablet  by mouth daily.    dapagliflozin (FARXIGA) 10 MG Oral Tab Take 1 tablet (10 mg total) by mouth daily.    pantoprazole 40 MG Oral Tab EC Please take 1 tablet 30 minutes before breakfast and 30 minutes before dinner for the next 14 days.    atorvastatin 40 MG Oral Tab Take 1 tablet (40 mg total) by mouth nightly.    buPROPion  MG Oral Tablet 24 Hr Take 1 tablet (150 mg total) by mouth daily.    metFORMIN HCl 1000 MG Oral Tab TAKE 1 TABLET BY MOUTH TWICE A DAY    Tirzepatide (MOUNJARO) 5 MG/0.5ML Subcutaneous Solution Pen-injector Inject 5 mg into the skin once a week.    glimepiride 4 MG Oral Tab Take 2 tablets (8 mg total) by mouth before breakfast. (Patient taking differently: Take 1 tablet (4 mg total) by mouth before breakfast.)    lisinopril 2.5 MG Oral Tab TAKE 1 TABLET BY MOUTH EVERY DAY    venlafaxine ER 75 MG Oral Capsule SR 24 Hr TAKE 1 CAPSULE BY MOUTH EVERY DAY    omeprazole 20 MG Oral Capsule Delayed Release Take 1 capsule (20 mg total) by mouth daily.    aspirin 81 MG Oral Tab Take 1 tablet (81 mg total) by mouth daily.    Glucose Blood (ONETOUCH VERIO) In Vitro Strip Use to check blood sugar daily.    ALPRAZolam (XANAX) 0.25 MG Oral Tab Take 1 tablet (0.25 mg total) by mouth nightly as needed.    Meloxicam 7.5 MG Oral Tab Take 1 tablet (7.5 mg total) by mouth daily. (Patient taking differently: Take 1 tablet (7.5 mg total) by mouth daily as needed.)       Review of Systems:   Review of Systems    Physical Exam:   Vital Signs:  Blood pressure 112/68, pulse 91, temperature 98 °F (36.7 °C), temperature source Oral, resp. rate 18, height 5' 4\" (1.626 m), weight 220 lb 3.2 oz (99.9 kg), last menstrual period 10/27/2024, SpO2 97%, not currently breastfeeding.  Physical Exam  Cervical Papanicolaou to be done in MD's office  General: The patient is alert and oriented x 3 and in no acute distress  Neck: Soft, supple, no lymphadenopathy  CV: Regular rate and rhythm, normal S1-S2, no murmurs  Lungs: Clear to  auscultation bilaterally  Abdomen: Soft, no epigastric tenderness, +mild periumbilical tenderness w/o rebound, ND, NABS  Extremities: no lower extremity edema    Results:     Lab Results   Component Value Date    WBC 7.6 11/20/2024    HGB 12.2 11/20/2024    HCT 37.4 11/20/2024    .0 11/20/2024    CREATSERUM 0.50 (L) 11/20/2024    BUN 7 (L) 11/20/2024     11/20/2024    K 3.0 (L) 11/20/2024     11/20/2024    CO2 24.0 11/20/2024     (H) 11/20/2024    CA 8.5 (L) 11/20/2024    ALB 4.6 11/19/2024    ALKPHO 56 11/19/2024    BILT 0.5 11/19/2024    TP 7.1 11/19/2024    AST 16 11/19/2024    ALT 19 11/19/2024    INR 0.83 11/03/2024    T4F 1.1 10/24/2019    LIP 36 11/19/2024    DDIMER 0.30 11/03/2024    MG 2.0 11/20/2024    TROPHS 7 11/03/2024     CT ABDOMEN+PELVIS(CONTRAST ONLY)(CPT=74177)    Result Date: 11/19/2024  CONCLUSION: Slight concentric mural thickening of nondilated loops of jejunum.  The findings are not associated with any other abnormality and are nonspecific but could represent a very mild enteritis.  There is no CT evidence of bowel obstruction ischemia, perforation, or abscess formation.  There are no other potentially acute appearing abnormalities clearly seen    Dictated by (CST): Brody Sauer MD on 11/19/2024 at 8:52 PM     Finalized by (CST): Brody Sauer MD on 11/19/2024 at 8:56 PM               Assessment/Plan:       Intractable vomiting  -No clear etiology.  The patient's boyfriend did have a diarrheal illness (no vomiting) at the onset of the patient's symptoms but the patient herself only had one loose stool and no diarrhea since then.  -Doubt related to Mounjaro as she has been on this medication for 2 years  -She takes daily omeprazole for chronic GERD  -no regular NSAID use  -one episode of possible hematemesis yesterday 11/19  -ddx: PUD/gastritis, enteritis (jejunal thickening seen on 2nd CT), diabetic gastroparesis (?)  -GI consulted; will likely need EGD  -cont  po protonix; prn zofran, IVFs  -NPO for now    Type 2 diabetes mellitus  -Followed by endocrine Dr. Balderas  -HgbA1c 6.1 in 7/2024; repeat pending  -On metformin, Mounjaro, and Farxiga as outpt  -Mounjaro and Farxiga on hold while in the hospital; will continue metformin  -Sliding scale insulin    Yeast vaginitis  -fluconazole 150mg po x 1 today    Hypercholesterolemia  -cont atorvastatin 40mg/day    GERD  -on omeprazole daily at home  -protonix this admission    VTE proph  -SCD's; pt is ambulatory        Kay Gonzalez MD  11/20/2024         [1]   Allergies  Allergen Reactions    Codeine OTHER (SEE COMMENTS)     Sleep walks, hallucinations    P[place]apple TONGUE SWELLING    Vicodin [Hydrocodone-Acetaminophen] NAUSEA AND VOMITING

## 2024-11-20 NOTE — DISCHARGE INSTRUCTIONS
1) stick with small meals 200-300 calories every few hours  2) stop marijuana use  3) do not take mounjaro until you hear from Dr. Balderas  4) take metoclopramide as needed for nausea  5) walk after meals  6) call to schedule gastric emptying study; [phone] or [phone]  7) Follow up with primary care doctor for appointment in 1 week.   8) Notify doctor immediately for fever, chills, vomiting, or inability to tolerate meals.         Individual Counseling  & Psychiatry Referrals:    To schedule an initial appointment , please contact  one of the providers listed below.     Lilian Salinas, MSW, LCSW  [address], [place] [id number] [phone]    Cathi Morris, MSN, PMHNP, APN (mental health Nurse Practitioner )  [facility] [id number] [id number] [address] [place], [place] [id number] [phone]    Mary Kwon PSYD,   [facility] [id number] [place] [address] [phone]    Brit May, MSW, LCSW  [address] [place], [place] [id number] [phone]

## 2024-11-21 ENCOUNTER — TELEPHONE (OUTPATIENT)
Facility: CLINIC | Age: 39
End: 2024-11-21

## 2024-11-21 ENCOUNTER — TELEPHONE (OUTPATIENT)
Dept: ENDOCRINOLOGY CLINIC | Facility: CLINIC | Age: 39
End: 2024-11-21

## 2024-11-21 ENCOUNTER — TELEPHONE (OUTPATIENT)
Dept: INTERNAL MEDICINE CLINIC | Facility: CLINIC | Age: 39
End: 2024-11-21

## 2024-11-21 VITALS
RESPIRATION RATE: 18 BRPM | OXYGEN SATURATION: 97 % | WEIGHT: 220.19 LBS | DIASTOLIC BLOOD PRESSURE: 75 MMHG | TEMPERATURE: 98 F | HEART RATE: 85 BPM | BODY MASS INDEX: 37.59 KG/M2 | HEIGHT: 64 IN | SYSTOLIC BLOOD PRESSURE: 109 MMHG

## 2024-11-21 DIAGNOSIS — R11.2 NAUSEA AND VOMITING, UNSPECIFIED VOMITING TYPE: Primary | ICD-10-CM

## 2024-11-21 LAB
ANION GAP SERPL CALC-SCNC: 5 MMOL/L (ref 0–18)
BASOPHILS # BLD AUTO: 0.04 X10(3) UL (ref 0–0.2)
BASOPHILS NFR BLD AUTO: 0.6 %
BUN BLD-MCNC: <5 MG/DL (ref 9–23)
CALCIUM BLD-MCNC: 8.8 MG/DL (ref 8.7–10.4)
CHLORIDE SERPL-SCNC: 111 MMOL/L (ref 98–112)
CO2 SERPL-SCNC: 26 MMOL/L (ref 21–32)
CREAT BLD-MCNC: 0.59 MG/DL
DEPRECATED RDW RBC AUTO: 41.4 FL (ref 35.1–46.3)
EGFRCR SERPLBLD CKD-EPI 2021: 117 ML/MIN/1.73M2 (ref 60–?)
EOSINOPHIL # BLD AUTO: 0.14 X10(3) UL (ref 0–0.7)
EOSINOPHIL NFR BLD AUTO: 2.1 %
ERYTHROCYTE [DISTWIDTH] IN BLOOD BY AUTOMATED COUNT: 13.5 % (ref 11–15)
GLUCOSE BLD-MCNC: 175 MG/DL (ref 70–99)
GLUCOSE BLDC GLUCOMTR-MCNC: 116 MG/DL (ref 70–99)
GLUCOSE BLDC GLUCOMTR-MCNC: 145 MG/DL (ref 70–99)
GLUCOSE BLDC GLUCOMTR-MCNC: 168 MG/DL (ref 70–99)
HCT VFR BLD AUTO: 38 %
HGB BLD-MCNC: 12.7 G/DL
IMM GRANULOCYTES # BLD AUTO: 0.02 X10(3) UL (ref 0–1)
IMM GRANULOCYTES NFR BLD: 0.3 %
LYMPHOCYTES # BLD AUTO: 2.2 X10(3) UL (ref 1–4)
LYMPHOCYTES NFR BLD AUTO: 32.7 %
MAGNESIUM SERPL-MCNC: 1.9 MG/DL (ref 1.6–2.6)
MCH RBC QN AUTO: 28.9 PG (ref 26–34)
MCHC RBC AUTO-ENTMCNC: 33.4 G/DL (ref 31–37)
MCV RBC AUTO: 86.4 FL
MONOCYTES # BLD AUTO: 0.62 X10(3) UL (ref 0.1–1)
MONOCYTES NFR BLD AUTO: 9.2 %
NEUTROPHILS # BLD AUTO: 3.71 X10 (3) UL (ref 1.5–7.7)
NEUTROPHILS # BLD AUTO: 3.71 X10(3) UL (ref 1.5–7.7)
NEUTROPHILS NFR BLD AUTO: 55.1 %
PLATELET # BLD AUTO: 222 10(3)UL (ref 150–450)
POTASSIUM SERPL-SCNC: 4.1 MMOL/L (ref 3.5–5.1)
RBC # BLD AUTO: 4.4 X10(6)UL
SODIUM SERPL-SCNC: 142 MMOL/L (ref 136–145)
WBC # BLD AUTO: 6.7 X10(3) UL (ref 4–11)

## 2024-11-21 PROCEDURE — 99214 OFFICE O/P EST MOD 30 MIN: CPT | Performed by: PHYSICIAN ASSISTANT

## 2024-11-21 RX ORDER — METOCLOPRAMIDE 5 MG/1
5 TABLET ORAL
Qty: 30 TABLET | Refills: 0 | Status: SHIPPED | OUTPATIENT
Start: 2024-11-21

## 2024-11-21 NOTE — TELEPHONE ENCOUNTER
Patient calling states received a joy from Amaya RODRIGUEZ, no telephone encounter found. States could be regards patient's currently admitted into hospital and being discharged today and regards mounjaro. Please call.

## 2024-11-21 NOTE — TELEPHONE ENCOUNTER
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 I am concerned about gastroparesis however her marijuana use and mounjaro may be contributing. I was discussing whether to stop mounjaro or cut down to the 2.5mg dose. Please let me know or the patient know.   I have also placed an order for gastric emptying study.     Thanks,   Gisel

## 2024-11-21 NOTE — TELEPHONE ENCOUNTER
Dr Fabian: Thank you for letting me know.  We will reach out to the patient      Endo staff:  Please call the patient.  Received notification from Dr. Hughes that patient was recently admitted with intractable vomiting.  She currently takes Mounjaro 5 mg once a week in addition to other medications.  How is her vomiting now?  When was the last dose of Mounjaro?  Please hold the Mounjaro for now till she hears back from us she should record her sugars before breakfast and before dinner.  If sugars go more than 250 she should please call us.  Thanks.

## 2024-11-21 NOTE — PLAN OF CARE
Discharge instructions given to patient. Patient was instructed to follow up with doctor for appointment. Saline lock removed. Patient verbalized understanding of discharge instructions. Patient transported by wheelchair and discharge home.

## 2024-11-21 NOTE — TELEPHONE ENCOUNTER
Patient still admitted to [facility] will call when discharged.

## 2024-11-21 NOTE — PLAN OF CARE
Problem: Patient Centered Care  Goal: Patient preferences are identified and integrated in the patient's plan of care  Description: Interventions:  - What would you like us to know as we care for you?   - Provide timely, complete, and accurate information to patient/family  - Incorporate patient and family knowledge, values, beliefs, and cultural backgrounds into the planning and delivery of care  - Encourage patient/family to participate in care and decision-making at the level they choose  - Honor patient and family perspectives and choices  Outcome: Progressing     Problem: Diabetes/Glucose Control  Goal: Glucose maintained within prescribed range  Description: INTERVENTIONS:  - Monitor Blood Glucose as ordered  - Assess for signs and symptoms of hyperglycemia and hypoglycemia  - Administer ordered medications to maintain glucose within target range  - Assess barriers to adequate nutritional intake and initiate nutrition consult as needed  - Instruct patient on self management of diabetes  Outcome: Progressing     Problem: Patient/Family Goals  Goal: Patient/Family Long Term Goal  Description: Patient's Long Term Goal: Tolerate meals without nausea or vomiting.    Interventions:  - Patient on regular diet.  - Intake and output is monitored.  - Antiemetics are administered prn for nausea or vomiting.  - See additional Care Plan goals for specific interventions  Outcome: Progressing  Goal: Patient/Family Short Term Goal  Description: Patient's Short Term Goal: Pain resolve.    Interventions:   - Pain level is assess using pain scale from 1 to 10.  - See additional Care Plan goals for specific interventions  Outcome: Progressing     Problem: GASTROINTESTINAL - ADULT  Goal: Minimal or absence of nausea and vomiting  Description: INTERVENTIONS:  - Maintain adequate hydration with IV or PO as ordered and tolerated  - Nasogastric tube to low intermittent suction as ordered  - Evaluate effectiveness of ordered  antiemetic medications  - Provide nonpharmacologic comfort measures as appropriate  - Advance diet as tolerated, if ordered  - Obtain nutritional consult as needed  - Evaluate fluid balance  Outcome: Progressing  Goal: Maintains or returns to baseline bowel function  Description: INTERVENTIONS:  - Assess bowel function  - Maintain adequate hydration with IV or PO as ordered and tolerated  - Evaluate effectiveness of GI medications  - Encourage mobilization and activity  - Obtain nutritional consult as needed  - Establish a toileting routine/schedule  - Consider collaborating with pharmacy to review patient's medication profile  Outcome: Progressing     Problem: METABOLIC/FLUID AND ELECTROLYTES - ADULT  Goal: Electrolytes maintained within normal limits  Description: INTERVENTIONS:  - Monitor labs and rhythm and assess patient for signs and symptoms of electrolyte imbalances  - Administer electrolyte replacement as ordered  - Monitor response to electrolyte replacements, including rhythm and repeat lab results as appropriate  - Fluid restriction as ordered  - Instruct patient on fluid and nutrition restrictions as appropriate  Outcome: Progressing     Problem: PAIN - ADULT  Goal: Verbalizes/displays adequate comfort level or patient's stated pain goal  Description: INTERVENTIONS:  - Encourage pt to monitor pain and request assistance  - Assess pain using appropriate pain scale  - Administer analgesics based on type and severity of pain and evaluate response  - Implement non-pharmacological measures as appropriate and evaluate response  - Consider cultural and social influences on pain and pain management  - Manage/alleviate anxiety  - Utilize distraction and/or relaxation techniques  - Monitor for opioid side effects  - Notify MD/LIP if interventions unsuccessful or patient reports new pain  - Anticipate increased pain with activity and pre-medicate as appropriate  Outcome: Progressing     Problem: RISK FOR INFECTION  - ADULT  Goal: Absence of fever/infection during anticipated neutropenic period  Description: INTERVENTIONS  - Monitor WBC  - Administer growth factors as ordered  - Implement neutropenic guidelines  Outcome: Progressing     Problem: SAFETY ADULT - FALL  Goal: Free from fall injury  Description: INTERVENTIONS:  - Assess pt frequently for physical needs  - Identify cognitive and physical deficits and behaviors that affect risk of falls.  - [place] fall precautions as indicated by assessment.  - Educate pt/family on patient safety including physical limitations  - Instruct pt to call for assistance with activity based on assessment  - Modify environment to reduce risk of injury  - Provide assistive devices as appropriate  - Consider OT/PT consult to assist with strengthening/mobility  - Encourage toileting schedule  Outcome: Progressing     Problem: DISCHARGE PLANNING  Goal: Discharge to home or other facility with appropriate resources  Description: INTERVENTIONS:  - Identify barriers to discharge w/pt and caregiver  - Include patient/family/discharge partner in discharge planning  - Arrange for needed discharge resources and transportation as appropriate  - Identify discharge learning needs (meds, wound care, etc)  - Arrange for interpreters to assist at discharge as needed  - Consider post-discharge preferences of patient/family/discharge partner  - Complete POLST form as appropriate  - Assess patient's ability to be responsible for managing their own health  - Refer to Case Management Department for coordinating discharge planning if the patient needs post-hospital services based on physician/LIP order or complex needs related to functional status, cognitive ability or social support system  Outcome: Progressing    Patient is presently resting in the bed. Alert x 4. Vital signs taken and stable. Patient on regular diet and is administered medications as needed for nausea or vomiting. Intravenous fluids infusing  and tolerated. Self care and independent. Patient will be discharge home today and have Gastric Emptying study done as outpatient. Call light within reach at all times.

## 2024-11-21 NOTE — PLAN OF CARE
Problem: Patient Centered Care  Goal: Patient preferences are identified and integrated in the patient's plan of care  Description: Interventions:  - What would you like us to know as we care for you?   - Provide timely, complete, and accurate information to patient/family  - Incorporate patient and family knowledge, values, beliefs, and cultural backgrounds into the planning and delivery of care  - Encourage patient/family to participate in care and decision-making at the level they choose  - Honor patient and family perspectives and choices  Outcome: Progressing     Problem: Diabetes/Glucose Control  Goal: Glucose maintained within prescribed range  Description: INTERVENTIONS:  - Monitor Blood Glucose as ordered  - Assess for signs and symptoms of hyperglycemia and hypoglycemia  - Administer ordered medications to maintain glucose within target range  - Assess barriers to adequate nutritional intake and initiate nutrition consult as needed  - Instruct patient on self management of diabetes  Outcome: Progressing     Problem: Patient/Family Goals  Goal: Patient/Family Long Term Goal  Description: Patient's Long Term Goal: Nausea treatment     Interventions:  - Monitor vitals  - Monitor appropriate labs  - Administer medications as ordered  - Follow MD's orders  - Update patient on plan of care   - Discharge planning   - See additional Care Plan goals for specific interventions  Outcome: Progressing  Goal: Patient/Family Short Term Goal  Description: Patient's Short Term Goal: pain management     Interventions:   - Monitor vitals  - Monitor appropriate labs  - Administer medications as ordered  - Follow MD's orders  - Update patient on plan of care   - Discharge planning   - See additional Care Plan goals for specific interventions  Outcome: Progressing     Problem: GASTROINTESTINAL - ADULT  Goal: Minimal or absence of nausea and vomiting  Description: INTERVENTIONS:  - Maintain adequate hydration with IV or PO as  I called [employer] in attempt to discover why this pt cannot get his [employer] script covered. I had a long conversation with Sobeida with provider customer services. He says from his side of things that he sees no reason this should not be covered. There is an authorization valid through 11/9/19.     His best recommendation is to call [employer] at [phone] to see why this medication is not authorized. He thought they maybe able to correct or override it.     I will pass this information to our PA team here to work on this next step.     Opal Burroughs RN     ordered and tolerated  - Nasogastric tube to low intermittent suction as ordered  - Evaluate effectiveness of ordered antiemetic medications  - Provide nonpharmacologic comfort measures as appropriate  - Advance diet as tolerated, if ordered  - Obtain nutritional consult as needed  - Evaluate fluid balance  Outcome: Progressing  Goal: Maintains or returns to baseline bowel function  Description: INTERVENTIONS:  - Assess bowel function  - Maintain adequate hydration with IV or PO as ordered and tolerated  - Evaluate effectiveness of GI medications  - Encourage mobilization and activity  - Obtain nutritional consult as needed  - Establish a toileting routine/schedule  - Consider collaborating with pharmacy to review patient's medication profile  Outcome: Progressing     Problem: METABOLIC/FLUID AND ELECTROLYTES - ADULT  Goal: Electrolytes maintained within normal limits  Description: INTERVENTIONS:  - Monitor labs and rhythm and assess patient for signs and symptoms of electrolyte imbalances  - Administer electrolyte replacement as ordered  - Monitor response to electrolyte replacements, including rhythm and repeat lab results as appropriate  - Fluid restriction as ordered  - Instruct patient on fluid and nutrition restrictions as appropriate  Outcome: Progressing     Problem: PAIN - ADULT  Goal: Verbalizes/displays adequate comfort level or patient's stated pain goal  Description: INTERVENTIONS:  - Encourage pt to monitor pain and request assistance  - Assess pain using appropriate pain scale  - Administer analgesics based on type and severity of pain and evaluate response  - Implement non-pharmacological measures as appropriate and evaluate response  - Consider cultural and social influences on pain and pain management  - Manage/alleviate anxiety  - Utilize distraction and/or relaxation techniques  - Monitor for opioid side effects  - Notify MD/LIP if interventions unsuccessful or patient reports new pain  -  Anticipate increased pain with activity and pre-medicate as appropriate  Outcome: Progressing     Problem: RISK FOR INFECTION - ADULT  Goal: Absence of fever/infection during anticipated neutropenic period  Description: INTERVENTIONS  - Monitor WBC  - Administer growth factors as ordered  - Implement neutropenic guidelines  Outcome: Progressing     Problem: SAFETY ADULT - FALL  Goal: Free from fall injury  Description: INTERVENTIONS:  - Assess pt frequently for physical needs  - Identify cognitive and physical deficits and behaviors that affect risk of falls.  - [place] fall precautions as indicated by assessment.  - Educate pt/family on patient safety including physical limitations  - Instruct pt to call for assistance with activity based on assessment  - Modify environment to reduce risk of injury  - Provide assistive devices as appropriate  - Consider OT/PT consult to assist with strengthening/mobility  - Encourage toileting schedule  Outcome: Progressing     Problem: DISCHARGE PLANNING  Goal: Discharge to home or other facility with appropriate resources  Description: INTERVENTIONS:  - Identify barriers to discharge w/pt and caregiver  - Include patient/family/discharge partner in discharge planning  - Arrange for needed discharge resources and transportation as appropriate  - Identify discharge learning needs (meds, wound care, etc)  - Arrange for interpreters to assist at discharge as needed  - Consider post-discharge preferences of patient/family/discharge partner  - Complete POLST form as appropriate  - Assess patient's ability to be responsible for managing their own health  - Refer to Case Management Department for coordinating discharge planning if the patient needs post-hospital services based on physician/LIP order or complex needs related to functional status, cognitive ability or social support system  Outcome: Progressing

## 2024-11-21 NOTE — PROGRESS NOTES
[facility]     Gastroenterology Progress Note    Anjana Raymundo Patient Status:  Observation    3/30/1985 MRN [id number]   Location [facility] 5SW/SE Attending Kay Gonzalez MD   Hosp Day # 0 PCP Gisel Swift DO       Subjective:   Patient feeling a little better today     Denies abdominal pain, nausea and vomiting  Denies CP, SOB, dizziness and light headedness  Objective:   Blood pressure 117/87, pulse 80, temperature 98.3 °F (36.8 °C), temperature source Oral, resp. rate 18, height 5' 4\" (1.626 m), weight 220 lb 3.2 oz (99.9 kg), last menstrual period 10/27/2024, SpO2 96%, not currently breastfeeding. Body mass index is 37.8 kg/m².    Gen: awake, alert patient, NAD  HEENT: EOMI, the sclera appears anicteric, oropharynx clear, mucus membranes appear moist  CV: RRR  Lung: no conversational dyspnea   Abdomen: soft NTND abdomen with NABS appreciated   Skin: dry, warm, no jaundice  Ext: no LE edema is evident  Neuro: Alert and interactive  Psych: calm, cooperative    Assessment and Plan:   Anjana Raymundo is a 39 year old female w/ PMHx of BMI 37.80, Anxiety, Asthma, GERD, DM2 (on GLP1), who presented to the ED with persistent N/V.     #N/V  -Labs on admission with with normal chemistry and LFTs.  Lipase 36.  No luekocytosis or anemia. A1C 6.1  -CT A/P with nonspecific slight concentric mural thickening of nondilated loops of jejunum.  No evidence of bowel obstruction, ischemia, perforation or abscess formation.  -No prior EGD/CLN  -Hx of DM2 on GLP1. Smokes cannabis multiple times per day.  Pt notes sick contact with boyfriend experiencing GI upset with similar timing.  Pt's initial diarrhea has since resolved.  -EGD - normal  Biopsy- without acute findings    Recommendations:  -continue cessation of marijuana  -hold mounjaro   -follow up in GI clinic    TE sent.     Pt is stable from a GI standpoint.  GI will sign off at this time.  Please, reach out to our team if new GI  concerns arise.  Thank you for the opportunity to participate in this patient's care.      Case discussed with Quin Bernal MD and Susana RODRIGUEZ.    Nena Valdez PA-C  [facility]  11/21/2024      Results:     Lab Results   Component Value Date    WBC 6.7 11/21/2024    HGB 12.7 11/21/2024    HCT 38.0 11/21/2024    .0 11/21/2024    CREATSERUM 0.59 11/21/2024    BUN <5 (L) 11/21/2024     11/21/2024    K 4.1 11/21/2024     11/21/2024    CO2 26.0 11/21/2024     (H) 11/21/2024    CA 8.8 11/21/2024    ALB 4.6 11/19/2024    ALKPHO 56 11/19/2024    BILT 0.5 11/19/2024    TP 7.1 11/19/2024    AST 16 11/19/2024    ALT 19 11/19/2024    INR 0.83 11/03/2024    T4F 1.1 10/24/2019    LIP 36 11/19/2024    DDIMER 0.30 11/03/2024    MG 1.9 11/21/2024       CT ABDOMEN+PELVIS(CONTRAST ONLY)(CPT=74177)    Result Date: 11/19/2024  CONCLUSION: Slight concentric mural thickening of nondilated loops of jejunum.  The findings are not associated with any other abnormality and are nonspecific but could represent a very mild enteritis.  There is no CT evidence of bowel obstruction ischemia, perforation, or abscess formation.  There are no other potentially acute appearing abnormalities clearly seen    Dictated by (CST): Brody Sauer MD on 11/19/2024 at 8:52 PM     Finalized by (CST): Brody Sauer MD on 11/19/2024 at 8:56 PM

## 2024-11-21 NOTE — DISCHARGE SUMMARY
Discharge Summary     Anjana Raymundo Patient Status:  Observation    3/30/1985 MRN [id number]   Location [facility] 5SW/SE Attending Kay Gonzalez MD   Hosp Day # 0 PCP Gisel Swift DO     Date of Admission: 2024  Date of Discharge: 2024  Discharge Disposition: Home or Self Care    Discharge Diagnosis: nausea and vomiting    History of Present Illness:       The patient is a 39-year-old female with a past medical history of type 2 diabetes mellitus, GERD on chronic omeprazole admitted with intractable nausea and vomiting.  The patient states that she initially developed acute onset nausea and vomiting on the night of 24.  She presented to the ED the following day where her WBC was slightly elevated at 11.2.  Chest x-ray and CT abdomen and pelvis were normal.  She was given a GI cocktail and Zofran with improvement in her symptoms.  She was discharged home on pantoprazole which she took in addition to her omeprazole.  She followed up in the office with Dr. Swift on 2024.  At that time she was continuing to have episodic vomiting, mainly in the morning.  She would generally just vomit up bile.  She was given a prescription for Zofran.  Over the course of the next week, however, she continued to vomit almost daily.  This did not necessarily seem to be associated with eating.  Most commonly she would vomit in the morning although she would also occasionally vomit at work.  She felt that the Zofran actually may have exacerbated her symptoms.        Patient continued to vomit mostly bile over the next week and again presented to the ED.  At that time her potassium was slightly low.  Her potassium was replaced and she was sent with another prescription for Zofran, although she did not feel that this helped.  Over the following week, she continued to have daily vomiting.  On the morning prior to admission, she finally vomited up food and states that it appeared red, possibly like  blood, although she had eaten some peanuts with chili pepper coating.   She called Dr. Swift who directed her to the ED.     In the ED, CBC was again normal.  Repeat CT revealed slight concentric mural thickening of nondilated loops of jejunum.  The findings were not associated with any other abnormality and were nonspecific but could represent a very mild enteritis.  There was no CT evidence of bowel obstruction, ischemia, perforation or abscess formation.     Given her ongoing symptoms, the patient was admitted for further evaluation.     On review of systems, the pt did have diarrhea x 1 episode when the vomiting first started on 11/2 but denies any diarrhea since that time.  Her boyfriend had a diarrheal illness (without vomiting) around the time that the patient's symptoms first began, but his symptoms have since resolved.  She does note some periumbilical discomfort but is unsure if that is from vomiting.  She denies any recent travel.  She denies any change in medication.  Initially the possibility of Mounjaro contributing to her symptoms was entertained.  However her symptoms did not seem to correlate with when she took the Mounjaro each week, and she has been on this medication for the past 2 years.  She is currently on 5 mg weekly.  She had previously taken 7.5 mg weekly but this seemed to suppress her appetite too much so she was decreased back to 5 mg.  She did take Advil 400 mg approximately 3 times since the onset of vomiting (for headache) but was not taking NSAIDs prior to that.    Brief Synopsis:   Pt had egd which was unremarkable  Gradually improved  Will pursue further workup as outpt  Stop glp-1  Discussed abstaining from marijuana use  Will plan for gastric emptying study as outpt    Lace+ Score: 40  59-90 High Risk  29-58 Medium Risk  0-28   Low Risk       TCM Follow-Up Recommendation:  LACE 29-58: Moderate Risk of readmission after discharge from the hospital.    Procedures during  hospitalization:   egd    Incidental or significant findings and recommendations (brief descriptions):  none    Lab/Test results pending at Discharge:   Gastric emptying    Consultants:  gi    Discharge Medication List:     Discharge Medications        CONTINUE taking these medications        Instructions Prescription details   ALPRAZolam 0.25 MG Tabs  Commonly known as: Xanax      Take 1 tablet (0.25 mg total) by mouth nightly as needed.   Quantity: 30 tablet  Refills: 0     aspirin 81 MG Tabs      Take 1 tablet (81 mg total) by mouth daily.   Refills: 0     atorvastatin 40 MG Tabs  Commonly known as: Lipitor      Take 1 tablet (40 mg total) by mouth nightly.   Quantity: 90 tablet  Refills: 1     buPROPion  MG Tb24  Commonly known as: Wellbutrin XL      Take 1 tablet (150 mg total) by mouth daily.   Quantity: 90 tablet  Refills: 3     dapagliflozin 10 MG Tabs  Commonly known as: Farxiga      Take 1 tablet (10 mg total) by mouth daily.   Quantity: 90 tablet  Refills: 1     glimepiride 4 MG Tabs  Commonly known as: Amaryl      Take 2 tablets (8 mg total) by mouth before breakfast.   Quantity: 180 tablet  Refills: 1     lisinopril 2.5 MG Tabs  Commonly known as: Prinivil; Zestril      TAKE 1 TABLET BY MOUTH EVERY DAY   Quantity: 90 tablet  Refills: 3     metFORMIN HCl 1000 MG Tabs  Commonly known as: GLUCOPHAGE      TAKE 1 TABLET BY MOUTH TWICE A DAY   Quantity: 180 tablet  Refills: 1     omeprazole 20 MG Cpdr  Commonly known as: PriLOSEC      Take 1 capsule (20 mg total) by mouth daily.   Quantity: 90 capsule  Refills: 3     OneTouch Verio Strp      Use to check blood sugar daily.   Quantity: 100 strip  Refills: 2     pantoprazole 40 MG Tbec  Commonly known as: Protonix      Please take 1 tablet 30 minutes before breakfast and 30 minutes before dinner for the next 14 days.   Quantity: 30 tablet  Refills: 0     PRENATAL VITAMIN OR      Take 1 tablet by mouth daily.   Refills: 0     venlafaxine ER 75 MG  Cp24  Commonly known as: Effexor-XR      TAKE 1 CAPSULE BY MOUTH EVERY DAY   Quantity: 90 capsule  Refills: 3            STOP taking these medications      Meloxicam 7.5 MG Tabs        Mounjaro 5 MG/0.5ML Sopn  Generic drug: Tirzepatide                 Follow-up appointment:   No follow-up provider specified.  Appointments for Next 30 Days 11/21/2024 - 12/21/2024        Date Arrival Time Visit Type Length Department Provider     12/17/2024  4:45 PM  [facility] [[id number]] 15 min [facility] Damaris Balderas MD    Patient Instructions:     Contact your primary care provider if your insurance requires a referral.    Please arrive 15 minutes prior to your scheduled appointment. Be sure to bring your current Insurance card, Photo ID, and medication bottles or a list of your current medications.      A 24 hour notice is required to cancel any appointment or you may be charged a $40 No Show Fee.     Important: 24 hour notice is required to cancel any appointment or you may be charged a $40 No Show Fee. Please notify your physician office.         Location Instructions:     Masks are optional for all patients and visitors, unless otherwise indicated.                      Supplementary Documentation:   [place] reviewed[place]  Vital signs:  Temp:  [97.5 °F (36.4 °C)-98.9 °F (37.2 °C)] 98.1 °F (36.7 °C)  Pulse:  [73-88] 85  Resp:  [14-18] 18  BP: (107-122)/(64-87) 109/75  SpO2:  [94 %-98 %] 97 %    Physical Exam:    General:  NAD  Cardiovascular:  S1, S2    -----------------------------------------------------------------------------------------------  PATIENT DISCHARGE INSTRUCTIONS: See electronic chart    Tip: Documentation requirements: For split shared discharge, BOTH providers need to document specific floor, unit, and time spent on the discharge.  The note needs to be signed by the provider with > 50% of time and bill under their NPI.   Time spent:  >30 min          Gisel Swift DO

## 2024-11-22 ENCOUNTER — TELEPHONE (OUTPATIENT)
Dept: INTERNAL MEDICINE CLINIC | Facility: CLINIC | Age: 39
End: 2024-11-22

## 2024-11-22 ENCOUNTER — PATIENT OUTREACH (OUTPATIENT)
Dept: CASE MANAGEMENT | Age: 39
End: 2024-11-22

## 2024-11-22 DIAGNOSIS — R10.13 EPIGASTRIC PAIN: Primary | ICD-10-CM

## 2024-11-22 DIAGNOSIS — R11.10 INTRACTABLE VOMITING: ICD-10-CM

## 2024-11-22 DIAGNOSIS — E11.9 TYPE 2 DIABETES MELLITUS WITHOUT COMPLICATION, WITHOUT LONG-TERM CURRENT USE OF INSULIN (HCC): ICD-10-CM

## 2024-11-22 DIAGNOSIS — Z02.9 ENCOUNTERS FOR UNSPECIFIED ADMINISTRATIVE PURPOSE: ICD-10-CM

## 2024-11-22 PROCEDURE — 1111F DSCHRG MED/CURRENT MED MERGE: CPT

## 2024-11-22 RX ORDER — LORATADINE 10 MG/1
10 TABLET ORAL DAILY
COMMUNITY

## 2024-11-22 RX ORDER — ANTIARTHRITIC COMBINATION NO.2 900 MG
1 TABLET ORAL DAILY
COMMUNITY

## 2024-11-22 NOTE — TELEPHONE ENCOUNTER
Spoke to the patient today for a Transitional Care Management hospital follow up call. The patient does not have a hospital follow up appointment scheduled at this time. Nurse care manager attempted to schedule but there were no openings available.     A Transitional Care Management/hospital follow up appointment is recommended by 12/5/2024 as the patient is a moderate risk for readmission.  Please advise.    BOOK BY DATE (last date for Transitional Care Management): 12/5/2024    Please follow up with the patient and assist with scheduling a Transitional Care Management/hospital follow up appointment.  Thank you!

## 2024-11-22 NOTE — TELEPHONE ENCOUNTER
Please call patient and set up TCM visit  She is to stop mounjaro per endocrinology  I did prescribe her reglan to take as needed for when she is nauseous    I would like her to follow up with me the week after thanksgiving so we can see how she feels

## 2024-11-22 NOTE — PROGRESS NOTES
Transitional Care Management   Discharge Date: 24  Contact Date: 2024    Assessment:  TCM Initial Assessment    General:  Assessment completed with: Patient  Patient Subjective: The patient reported slight improvement with symptoms. The patient has not vomited since returning home. The patient reported improvement with nausea. The patient reported continued constant central abdominal discomfort rated 4/10. The patient has had a bowel movement this morning and denies any melena. The patient began menses today. The patient denies any abdominal distention or fever/chills. The patient denies any dizziness, weakness, or irregular heart beats. The patient's appetite has remained low. The patient reported a fasting glucose reading of 102 this morning. The patient denies any vaginal pain/irritation/itching or increased discharge.  Chief Complaint: Intractable vomiting  -one episode of possible hematemesis yesterday   Type 2 diabetes mellitus  Yeast vaginitis  Hypercholesterolemia  GERD  Verify patient name and  with patient/ caregiver: Yes    Hospital Stay/Discharge:  Tell me what you understand of why you were in the hospital or emergency department: I had vomiting.  Prior to leaving the hospital were your Discharge Instructions reviewed with you?: Yes  Did you receive a copy of your written Discharge Instructions?: Yes  What questions do you have about your Discharge Instructions?: No  Do you feel better or worse since you left the hospital or emergency department?: Better    Follow - Up Appointment:  Do you have a follow-up appointment?: Yes  Date: 24  Physician: Dr. Balderas  Are there any barriers to getting to your follow-up appointment?: No    Home Health/DME:  Prior to leaving the hospital was Home Health (HH) arranged for you?: N/A  Are HH needs identified by staff during the assessment?: No     Prior to leaving the hospital or emergency department was Durable Medical Equipment (DME),  medical supplies, or infusions arranged for you?: N/A (The patient has a glucometer/supplies at home.)  Are DME/medical supply/infusions needs identified by staff during this assessment?: No     Medications/Diet:  Did any of your medications change, during or after your hospital stay or ED visit?: Yes  Do you have your new or updated medications?: No (The patient reported plans to  medications later today.)  Do you understand what your medications are for and possible side effects?: Yes  Are there any reasons that keep you from taking your medication as prescribed?: No  Any concerns about medication refills?: No    Were you given a different diet per your Discharge Instructions?: No  Reason: The patient will continue a diabetic diet.     Questions/Concerns:  Do you have any questions or concerns that have not been discussed?: No       Nursing Interventions:    Patient symptoms were assessed, education was completed for signs/symptoms to monitor, and reviewed when to contact providers versus go to the emergency department/call 911.   Reviewed all discharge instructions with a focus on recommendations for small, frequent meals, walking after meals, and medication changes.   Stressed the importance of close glucose monitoring.   All medications were reviewed and educated on the importance of taking the medications as directed.   Appointments were reviewed and stressed the importance of a close follow up with providers. A telephone encounter was sent to the primary care provider's office for an appointment request/review  The patient verbalized understanding and will contact the office with any further questions or concerns.       Medication Review:   NCM did confirm the patient has discontinued the following as directed:  Meloxicam 7.5 MG Tabs Mounjaro 5 MG/0.5ML Sopn (Tirzepatide)  Current Outpatient Medications   Medication Sig Dispense Refill    metoclopramide 5 MG Oral Tab Take 1 tablet (5 mg total) by mouth  3 (three) times daily before meals. 30 tablet 0    Prenatal Vit-Fe Fumarate-FA (PRENATAL VITAMIN OR) Take 1 tablet by mouth daily.      dapagliflozin (FARXIGA) 10 MG Oral Tab Take 1 tablet (10 mg total) by mouth daily. 90 tablet 1    pantoprazole 40 MG Oral Tab EC Please take 1 tablet 30 minutes before breakfast and 30 minutes before dinner for the next 14 days. 30 tablet 0    atorvastatin 40 MG Oral Tab Take 1 tablet (40 mg total) by mouth nightly. 90 tablet 1    buPROPion  MG Oral Tablet 24 Hr Take 1 tablet (150 mg total) by mouth daily. 90 tablet 3    metFORMIN HCl 1000 MG Oral Tab TAKE 1 TABLET BY MOUTH TWICE A  tablet 1    glimepiride 4 MG Oral Tab Take 2 tablets (8 mg total) by mouth before breakfast. (Patient taking differently: Take 1 tablet (4 mg total) by mouth before breakfast.) 180 tablet 1    Glucose Blood (ONETOUCH VERIO) In Vitro Strip Use to check blood sugar daily. 100 strip 2    lisinopril 2.5 MG Oral Tab TAKE 1 TABLET BY MOUTH EVERY DAY 90 tablet 3    venlafaxine ER 75 MG Oral Capsule SR 24 Hr TAKE 1 CAPSULE BY MOUTH EVERY DAY 90 capsule 3    ALPRAZolam (XANAX) 0.25 MG Oral Tab Take 1 tablet (0.25 mg total) by mouth nightly as needed. 30 tablet 0    omeprazole 20 MG Oral Capsule Delayed Release Take 1 capsule (20 mg total) by mouth daily. 90 capsule 3    aspirin 81 MG Oral Tab Take 1 tablet (81 mg total) by mouth daily.       Did patient review medications using current pill bottles and not just a medication list?  Yes  Discharge medications reviewed/discussed/and reconciled against outpatient medications with patient.  Any changes or updates to medications sent to primary care provider.  Patient Acknowledged    The patient has held [place] as directed.       SDOH:   Transportation:   Transportation Needs: No Transportation Needs (11/22/2024)    Transportation Needs     Lack of Transportation: No     Car Seat: Not on file       Financial strain:   Financial Resource Strain: Low Risk   (11/22/2024)    Financial Resource Strain     Difficulty of Paying Living Expenses: Not hard at all     Med Affordability: No       Follow-up Appointments:  Your appointments       Date & Time Appointment Department ([place])    Dec 17, 2024 4:45 PM CST Follow Up Visit with Damaris Balderas MD [place], [facility] ([facility])    Contact your primary care provider if your insurance requires a referral.    Please arrive 15 minutes prior to your scheduled appointment. Be sure to bring your current Insurance card, Photo ID, and medication bottles or a list of your current medications.      A 24 hour notice is required to cancel any appointment or you may be charged a $40 No Show Fee.     Important: 24 hour notice is required to cancel any appointment or you may be charged a $40 No Show Fee. Please notify your physician office.               [place], [facility]  [address], [address]  [facility] [id number] [phone]            Transitional Care Clinic  Was TCC Ordered: No      Primary Care Provider (If no TCC appointment)  Does patient already have a PCP appointment scheduled? No  Nurse Care Manager Attempted to schedule PCP office TCM appointment with patient but there were no openings within the Transitional Care Management time frame. A telephone encounter was sent to the primary care physician office for an appointment request.       Specialist  Does the patient have any other follow-up appointment(s) that need to be scheduled? Yes   -If yes: Nurse Care Manager reviewed upcoming specialist appointments with patient: Yes; The patient is scheduled with Dr. Balderas on 12/17/2024.    -Does the patient need assistance scheduling appointment(s): No    CCM referral placed:  Not Applicable    Book By Date: 12/5/2024

## 2024-11-25 NOTE — TELEPHONE ENCOUNTER
Patient contacted, accepted below appointment, and given detailed office directions.    Your Appointments      Monday December 09, 2024 11:30 AM  Consult with Nena Valdez PA-C  [facility] ([facility]) [facility] [address]  [facility] [address]  [phone]

## 2024-11-25 NOTE — TELEPHONE ENCOUNTER
ANTICOAGULATION FOLLOW-UP CLINIC VISIT    Patient Name:  Nikunj Anthony  Date:  12/1/2017  Contact Type:  Face to Face    SUBJECTIVE:     Patient Findings     Positives No Problem Findings    Comments Patient will be travelling out of state possibly up to 1 month so he will call when he is in town for his next INR appt.           OBJECTIVE    INR Protime   Date Value Ref Range Status   12/01/2017 2.5 (A) 0.86 - 1.14 Final       ASSESSMENT / PLAN  INR assessment THER    Recheck INR In: 6 WEEKS    INR Location Clinic      Anticoagulation Summary as of 12/1/2017     INR goal 2.0-3.0   Today's INR 2.5   Maintenance plan 5 mg (5 mg x 1) every day   Full instructions 5 mg every day   Weekly total 35 mg   No change documented Leticia Antunez RN   Plan last modified Leticia Antunez RN (12/23/2016)   Next INR check 1/12/2018   Priority INR   Target end date Indefinite    Indications   Long-term (current) use of anticoagulants [Z79.01] [Z79.01]  Atrial fibrillation with rapid ventricular response (H) (Resolved) [I48.91]         Anticoagulation Episode Summary     INR check location     Preferred lab     Send INR reminders to  ANTICOAG CLINIC    Comments       Anticoagulation Care Providers     Provider Role Specialty Phone number    Fred Del Valle MD [facility] [phone]            See the Encounter Report to view Anticoagulation Flowsheet and Dosing Calendar (Go to Encounters tab in chart review, and find the Anticoagulation Therapy Visit)        Leticia Antunez, RN                Dr. Balderas,     Patient states that her vomiting symptoms have gotten a lot better. She has not had any episodes of vomiting recently and is able to hold food. Patient states her last Mounjaro injection was Monday 11/18. Patient also stated she will hold Mounjaro until her appt 12/17. Patient aware to check BG twice daily and to call if any low BG or BG persistently over 250. Patient stated she had one low BG of 67 but was able to correct it. Per pt, BG have been good, ranging from 100-136.

## 2024-11-27 ENCOUNTER — TELEPHONE (OUTPATIENT)
Dept: INTERNAL MEDICINE CLINIC | Facility: CLINIC | Age: 39
End: 2024-11-27

## 2024-12-02 ENCOUNTER — OFFICE VISIT (OUTPATIENT)
Dept: INTERNAL MEDICINE CLINIC | Facility: CLINIC | Age: 39
End: 2024-12-02

## 2024-12-02 ENCOUNTER — TELEPHONE (OUTPATIENT)
Dept: INTERNAL MEDICINE CLINIC | Facility: CLINIC | Age: 39
End: 2024-12-02

## 2024-12-02 VITALS
OXYGEN SATURATION: 96 % | HEART RATE: 94 BPM | BODY MASS INDEX: 38.93 KG/M2 | HEIGHT: 64 IN | SYSTOLIC BLOOD PRESSURE: 124 MMHG | TEMPERATURE: 97 F | WEIGHT: 228 LBS | DIASTOLIC BLOOD PRESSURE: 78 MMHG

## 2024-12-02 DIAGNOSIS — R11.2 NAUSEA AND VOMITING, UNSPECIFIED VOMITING TYPE: Primary | ICD-10-CM

## 2024-12-02 NOTE — PROGRESS NOTES
Subjective:   Anjana Raymundo is a 39 year old female who presents for hospital follow up.   She was discharged from [facility] to Home or Self Care  Admission Date: 11/19/24   Discharge Date: 11/21/24  Hospital Discharge Diagnosis: vomiting and poor oral intake    Interactive contact within 2 business days post discharge first initiated on Date: 11/22/2024    I accessed [facility] and/or Care Everywhere and personally reviewed the following for the recent hospitalization: provider notes, consults, summaries, labs and other test results and the pertinent findings are documented below.     HPI:       Today-still constipation; better today after taking stool softener  Having pain in the left side  Sometimes when pooping-can be small pieces  Drinking at least 80-100oz daily    Still nauseated.   Forgot to take reglan on thanksgiving, felt worse at night, but took in the morning            History/Other:   Current Medications:  Medication Reconciliation:  I am aware of an inpatient discharge within the last 30 days.  The discharge medication list has been reconciled with the patient's current medication list and reviewed by me. See medication list for additions of new medication, and changes to current doses of medications and discontinued medications.  Outpatient Medications Marked as Taking for the 12/2/24 encounter (Office Visit) with Gisel Swift, DO   Medication Sig    loratadine 10 MG Oral Tab Take 1 tablet (10 mg total) by mouth daily.    Biotin 5000 MCG Oral Tab Take 1 tablet (5 mg total) by mouth daily.    metoclopramide 5 MG Oral Tab Take 1 tablet (5 mg total) by mouth 3 (three) times daily before meals.    Prenatal Vit-Fe Fumarate-FA (PRENATAL VITAMIN OR) Take 1 tablet by mouth daily.    dapagliflozin (FARXIGA) 10 MG Oral Tab Take 1 tablet (10 mg total) by mouth daily.    atorvastatin 40 MG Oral Tab Take 1 tablet (40 mg total) by mouth nightly.    buPROPion  MG Oral Tablet 24 Hr Take 1 tablet (150 mg  total) by mouth daily. (Patient taking differently: Take 1 tablet (150 mg total) by mouth at bedtime.)    metFORMIN HCl 1000 MG Oral Tab TAKE 1 TABLET BY MOUTH TWICE A DAY    glimepiride 4 MG Oral Tab Take 2 tablets (8 mg total) by mouth before breakfast. (Patient taking differently: Take 1 tablet (4 mg total) by mouth before breakfast.)    Glucose Blood (ONETOUCH VERIO) In Vitro Strip Use to check blood sugar daily.    lisinopril 2.5 MG Oral Tab TAKE 1 TABLET BY MOUTH EVERY DAY    venlafaxine ER 75 MG Oral Capsule SR 24 Hr TAKE 1 CAPSULE BY MOUTH EVERY DAY    ALPRAZolam (XANAX) 0.25 MG Oral Tab Take 1 tablet (0.25 mg total) by mouth nightly as needed.    omeprazole 20 MG Oral Capsule Delayed Release Take 1 capsule (20 mg total) by mouth daily.    aspirin 81 MG Oral Tab Take 1 tablet (81 mg total) by mouth daily.       Review of Systems:  GENERAL: weight stable, energy stable, no sweating  GI: + abdominal pain, denies heartburn, denies diarrhea      Objective:   Patient's last menstrual period was 10/27/2024 (exact date).  Estimated body mass index is 39.14 kg/m² as calculated from the following:    Height as of this encounter: 5' 4\" (1.626 m).    Weight as of this encounter: 228 lb (103.4 kg).   /78   Pulse 94   Temp 97.3 °F (36.3 °C) (Oral)   Ht 5' 4\" (1.626 m)   Wt 228 lb (103.4 kg)   LMP 10/27/2024 (Exact Date)   SpO2 96%   BMI 39.14 kg/m²    GENERAL: well developed, well nourished, in no apparent distress  SKIN: no rashes, no suspicious lesions  HEENT: atraumatic, normocephalic, ears and throat are clear  EYES: PERRLA, EOMI, conjunctiva are clear  NECK: supple, no adenopathy, no bruits  CHEST: no chest tenderness  LUNGS: clear to auscultation  CARDIO: RRR without murmur  GI: good BS's, no masses, very mild LUQ and lower quadrant ttp, no rebound or rigidity  Assessment & Plan:     1) nausea and vomiting  Improved; but still has nausea  Stopped mounjaro  Discussed cutting back on marijuana use  further  Reglan 2-3 times daily  Has NM study at end of month  Seeing GI next week    2) constipation  Continue fluid intake  Miralax nightly x 2 days, then start fiber supplement twice daily    Gisel Swift DO, 12/02/24, 3:11 PM

## 2024-12-02 NOTE — TELEPHONE ENCOUNTER
Disability & Leave Healthcare Provider Statement form completed and signed by Dr. Swift.    Forms faxed to:  [phone]    Fax confirmation received.    Forms sent to scanning.    Copy placed in weekly hold bin.    Left message updating pt.   Copy placed in outgoing mail to patient's home address.

## 2024-12-09 ENCOUNTER — MED REC SCAN ONLY (OUTPATIENT)
Facility: CLINIC | Age: 39
End: 2024-12-09

## 2024-12-09 ENCOUNTER — OFFICE VISIT (OUTPATIENT)
Facility: CLINIC | Age: 39
End: 2024-12-09
Payer: COMMERCIAL

## 2024-12-09 VITALS
SYSTOLIC BLOOD PRESSURE: 121 MMHG | HEIGHT: 64 IN | WEIGHT: 228 LBS | BODY MASS INDEX: 38.93 KG/M2 | HEART RATE: 87 BPM | DIASTOLIC BLOOD PRESSURE: 84 MMHG

## 2024-12-09 DIAGNOSIS — K59.00 CONSTIPATION, UNSPECIFIED CONSTIPATION TYPE: ICD-10-CM

## 2024-12-09 DIAGNOSIS — R11.2 NAUSEA AND VOMITING, UNSPECIFIED VOMITING TYPE: Primary | ICD-10-CM

## 2024-12-09 NOTE — PROGRESS NOTES
[facility] - Gastroenterology                                                                                                               Reason for consult:   Chief Complaint   Patient presents with    Hospital F/U     Has discomfort in stomach ,     Constipation       Requesting physician or provider: Gisel Swift DO      HPI:   Anjana Raymundo is a 39 year old year-old female with history of BMI 37.80, Anxiety, Asthma, GERD, DM2 (on GLP1), who presented to the ED with persistent N/V. CT A/P with nonspecific slight concentric mural thickening of nondilated loops of jejunum.  No evidence of bowel obstruction, ischemia, perforation or abscess formation.EGD - normal  Biopsy- without acute findings. Presents today for follow up.      Nausea/vomiting-  -has not had any vomiting  -continues to have nausea, however has improved with reglan twice   -taking reglan twice a day     Constipation-   -now increased constipation, having 4-5 days before a BM  -having small pebble like bm  -tried miralax with little relief, tried for 4 days   -did add fiber one day ago, had more of a BM  -has increased gas and bowel sounds  -having lower abdominal pain  -at night cannot get comfy because she has pain   -drinking a lot of water  -no bright red blood in stool  -darker stool  -tried colace     -usually regular with BM, constipation is new     NSAIDS: none  Tobacco: none  Alcohol: rare  Marijuana: none  Illicit drugs: none     FH GI malignancy- none  FH celiac dz- none  FH liver dz- none  FH IBD- none    No history of adverse reaction to sedation  No HARIS  No anticoagulants  No pacemaker/defibrillator  No pain medications and/or sleep aides    Prior endoscopies:  11/20/2024- EGD  Impression:   1. Normal EGD     Recommend:  1. Follow-up pathology  2. Continue ppi  3. May resume diet    Wt Readings from Last 6 Encounters:   12/09/24 228 lb (103.4 kg)   12/02/24 228 lb (103.4 kg)    24 220 lb 3.2 oz (99.9 kg)   24 220 lb (99.8 kg)   24 220 lb (99.8 kg)   24 240 lb (108.9 kg)        History, Medications, Allergies, ROS:      Past Medical History:    Allergic rhinitis    Anesthesia complication    sedation didnt work. remembered entire procedure.    Anxiety    It just happens from time to time    Asthma (HCC)    in high school    Esophageal reflux    Learning disability    Obesity    Reflux    Type II or unspecified type diabetes mellitus without mention of complication, not stated as uncontrolled    Wears glasses      Past Surgical History:   Procedure Laterality Date    Ankle scope,extens debridemnt Right 2014    Procedure: ARTHROSCOPY ANKLE WITH DEBRIDEMENT;  Surgeon: Noah Morgan MD;  Location: [facility]    Egd  2024    DR. GUADALUPE @ [facility]. WITH BIOPSIES. DX: NORMAL EXAM    Gastrocnemius recession Right 2014    Procedure: GASTROC RECESSION FOOT;  Surgeon: Noah Morgan MD;  Location: [facility]    Hc implant ear tubes      Molecular extraction ([id number])      wisdom teeth.    Other surgical history      wisdom teeth removed    Other surgical history      bilateral tubes in ears    Other surgical history      R annkle surgery      Family Hx:   Family History   Problem Relation Age of Onset    Diabetes Father 40    Hypertension Father     Diabetes Mother 40    Seizure Disorder Brother     Cancer Maternal Grandmother         unknown    Cancer Paternal Uncle         lung, smoker      Social History:   Social History     Socioeconomic History    Marital status: Life Partner   Tobacco Use    Smoking status: Former     Current packs/day: 0.00     Types: Cigarettes     Quit date: 3/25/2002     Years since quittin.7    Smokeless tobacco: Never    Tobacco comments:     0.5 pack q 2 wks   Vaping Use    Vaping status: Never Used   Substance and Sexual Activity    Alcohol use: Yes     Comment: Maybe 2 times a month    Drug use: Yes      Frequency: 7.0 times per week     Types: Cannabis   Other Topics Concern    Caffeine Concern Yes     Comment: Coffee - 1 cup per day      Social Drivers of Health     Financial Resource Strain: Low Risk  (11/22/2024)    Financial Resource Strain     Difficulty of Paying Living Expenses: Not hard at all     Med Affordability: No   Food Insecurity: No Food Insecurity (11/20/2024)    Food Insecurity     Food Insecurity: Never true   Transportation Needs: No Transportation Needs (11/22/2024)    Transportation Needs     Lack of Transportation: No   Housing Stability: Low Risk  (11/20/2024)    Housing Stability     Housing Instability: No        Medications (Active prior to today's visit):  Current Outpatient Medications   Medication Sig Dispense Refill    loratadine 10 MG Oral Tab Take 1 tablet (10 mg total) by mouth daily.      Biotin 5000 MCG Oral Tab Take 1 tablet (5 mg total) by mouth daily.      Prenatal Vit-Fe Fumarate-FA (PRENATAL VITAMIN OR) Take 1 tablet by mouth daily.      dapagliflozin (FARXIGA) 10 MG Oral Tab Take 1 tablet (10 mg total) by mouth daily. 90 tablet 1    atorvastatin 40 MG Oral Tab Take 1 tablet (40 mg total) by mouth nightly. 90 tablet 1    buPROPion  MG Oral Tablet 24 Hr Take 1 tablet (150 mg total) by mouth daily. 90 tablet 3    metFORMIN HCl 1000 MG Oral Tab TAKE 1 TABLET BY MOUTH TWICE A  tablet 1    glimepiride 4 MG Oral Tab Take 2 tablets (8 mg total) by mouth before breakfast. 180 tablet 1    lisinopril 2.5 MG Oral Tab TAKE 1 TABLET BY MOUTH EVERY DAY 90 tablet 3    venlafaxine ER 75 MG Oral Capsule SR 24 Hr TAKE 1 CAPSULE BY MOUTH EVERY DAY 90 capsule 3    ALPRAZolam (XANAX) 0.25 MG Oral Tab Take 1 tablet (0.25 mg total) by mouth nightly as needed. 30 tablet 0    omeprazole 20 MG Oral Capsule Delayed Release Take 1 capsule (20 mg total) by mouth daily. 90 capsule 3    aspirin 81 MG Oral Tab Take 1 tablet (81 mg total) by mouth daily.      metoclopramide 5 MG Oral Tab Take 1  tablet (5 mg total) by mouth 3 (three) times daily before meals. (Patient not taking: Reported on 12/9/2024) 30 tablet 0    Glucose Blood (ONETOUCH VERIO) In Vitro Strip Use to check blood sugar daily. 100 strip 2       Allergies:  Allergies[1]    ROS:   CONSTITUTIONAL: negative for fevers, chills, sweats and weight loss  EYES Negative for red eyes, yellow eyes, changes in vision  HEENT: Negative for dysphagia and hoarseness  RESPIRATORY: Negative for cough and shortness of breath  CARDIOVASCULAR: Negative for chest pain, palpitations  GASTROINTESTINAL: See HPI  GENITOURINARY: Negative for dysuria and frequency  MUSCULOSKELETAL: Negative for arthralgias and myalgias  NEUROLOGICAL: Negative for dizziness and headaches  BEHAVIOR/PSYCH: Negative for anxiety and poor appetite    PHYSICAL EXAM:   Blood pressure 121/84, pulse 87, height 5' 4\" (1.626 m), weight 228 lb (103.4 kg), last menstrual period 10/27/2024, not currently breastfeeding.    GEN: WD/WN, NAD  HEENT: Supple symmetrical, trachea midline  CV: RRR, the extremities are warm and well perfused   LUNGS: No increased work of breathing  ABDOMEN: No scars, normal bowel sounds, soft, non-tender, non-distended no rebound or guarding, no masses, no hepatomegaly  MSK: No redness, no warmth, no swelling of joints  SKIN: No jaundice, no erythema, no rashes  HEMATOLOGIC: No bleeding, no bruising  NEURO: Alert and interactive, normal gait    Labs/Imaging/Procedures:     Patient's pertinent labs and imaging were reviewed and discussed with patient today.     Lab Results   Component Value Date    WBC 6.7 11/21/2024    RBC 4.40 11/21/2024    HGB 12.7 11/21/2024    HCT 38.0 11/21/2024    MCV 86.4 11/21/2024    MCH 28.9 11/21/2024    MCHC 33.4 11/21/2024    RDW 13.5 11/21/2024    .0 11/21/2024    MPV 7.8 06/23/2016        Lab Results   Component Value Date     (H) 11/21/2024    BUN <5 (L) 11/21/2024    BUNCREA 14.0 11/20/2024    CREATSERUM 0.59 11/21/2024     ANIONGAP 5 11/21/2024    GFRNAA 114 04/09/2022    GFRAA 132 04/09/2022    CA 8.8 11/21/2024    OSMOCALC 290 11/20/2024    ALKPHO 56 11/19/2024    AST 16 11/19/2024    ALT 19 11/19/2024    ALKPHOS 85 03/12/2016    BILT 0.5 11/19/2024    TP 7.1 11/19/2024    ALB 4.6 11/19/2024    GLOBULIN 2.5 11/19/2024    AGRATIO 1.7 05/04/2024     11/21/2024    K 4.1 11/21/2024     11/21/2024    CO2 26.0 11/21/2024        CT ABDOMEN+PELVIS(CONTRAST ONLY)(CPT=74177)    Result Date: 11/19/2024  PROCEDURE: CT ABDOMEN + PELVIS (CONTRAST ONLY) (CPT=74177)  COMPARISON: [facility], CT ABDOMEN + PELVIS (CONTRAST ONLY) (CPT=74177), 11/03/2024, 11:13 AM.  INDICATIONS: Generalized abdominal pain, nausea, vomiting.  TECHNIQUE: CT images of the abdomen and pelvis were obtained with non-ionic intravenous contrast material.  Automated exposure control for dose reduction was used. Adjustment of the mA and/or kV was done based on the patient's size. Use of iterative reconstruction technique for dose reduction was used.  Dose information is transmitted to the ACR (American College of Radiology) NRDR (National Radiology Data Registry) which includes the Dose Index Registry.  FINDINGS:   The following findings were made:  1. There appears to be slight concentric mural thickening of pr multiple loops of jejunum.  There are no associated abnormalities.  The findings are non specific but could represent a mild enteritis.  There is no CT evidence of bowel obstruction, ischemia, perforation, or abscess formation.  The remainder of the bowel appears grossly normal.  There are no other potentially acute appearing abnormalities within the abdomen/pelvis.  2. The liver demonstrates slight decreased attenuation compatible with fatty infiltration.  There are no focal hepatic lesions identified.  3. There is a very small fat containing periumbilical hernia.  There is no bowel or inflammation within this hernia.  4. There are very small  bilateral ovarian cysts measuring up to 2 cm. There is no associated abnormality  The remainder of the examination is unremarkable.  Specifically, the lung bases are clear.  The visualized aspects of the spleen, pancreas, gallbladder, adrenals, kidneys, stomach, colon, and remaining soft tissues demonstrate a grossly normal CT appearance for patient of this age.  There is no free fluid or organized fluid collection.  There is no significant adenopathy.         CONCLUSION: Slight concentric mural thickening of nondilated loops of jejunum.  The findings are not associated with any other abnormality and are nonspecific but could represent a very mild enteritis.  There is no CT evidence of bowel obstruction ischemia, perforation, or abscess formation.  There are no other potentially acute appearing abnormalities clearly seen    Dictated by (CST): Brody Sauer MD on 11/19/2024 at 8:52 PM     Finalized by (CST): Brody Sauer MD on 11/19/2024 at 8:56 PM                  .  ASSESSMENT/PLAN:   Anjana Raymundo is a 39 year old year-old female with history of BMI 37.80, Anxiety, Asthma, GERD, DM2 (on GLP1), who presented to the ED with persistent N/V. CT A/P with nonspecific slight concentric mural thickening of nondilated loops of jejunum.  No evidence of bowel obstruction, ischemia, perforation or abscess formation.EGD - normal  Biopsy- without acute findings. Presents today for follow up.     #nausea  #vomiting  -no further vomiting since discharge from [facility]  -continues to have nausea on and off  -has been taking reglan BID and finds it does help with symptoms   -she has gastric emptying study that will be completed end of December  -discussed continuation of small frequent meals, nausea may also be related to constipation see below information    #constipation  -new constipation prior to hospitalization  -now having BM every 4-5 days   -having pebble like BM, often incomplete evacuation  -discussed plan for wash  out with miralax and then continuation of fiber and water intake    Recommendations:  Gastric emptying study  -hold reglan day before and day of procedure  -hold mounjaro if restarted    -follow up with Talib Betancourt or Alesha Moody    Bowel movements  -Increase water intake to 64oz of water per day  -Increase fiber to 20-30 g per day with fruits, vegetables and whole grains  -Increase exercise to 150 mins/week  -Do not ignore urge to defecate  -Avoid artifical sugars  -If bowel habits change or constipation worsens, call our office sooner     WASHOUT INSTRUCTIONS:  1. Pick a day you will be at home, close to a toilet.  2. Have some juice for breakfast.   3. Around 8AM start drinking the solution prescribed (for MIRALAX instructions, see below) over the course of 3-5 hours. IF having nausea/bloating, take a break for 30 minutes, walk around and then resume drinking. If vomiting, take a break for 1 hour, if symptoms improve, and you feel well, can resume drinking.  4. Goal is to finish solution or until stools turn liquid & yellow.  5. For lunch you should have a liquid diet (7up, water, gingerale, gatorade, etc)  6. After prep, for dinner you can have a light dinner.  7. Resume regular meals the following day, along with laxative medications.  8. You should continue your regular medications during the washout day.       Mix the ENTIRE bottle of MiraLAX 8.3-8.9oz bottle (either 238 or 255 grams) into 64 ounces of Gatorade or another clear liquid (sports drink, apple juice, lemonade). Shake until MiraLAX is dissolved.  Drink one 8 ounce glass of the MiraLAX solution every 30 minutes until the solution is gone.  If you become nauseated or feel full, stop drinking for at least 30 minutes. Then resume the MiraLAX solution using smaller amounts   It is important to continue to drink clear liquids even after you have finished the MiraLAX to continue to flush your colon and ensure that you are staying well hydrated.          Orders This Visit:  No orders of the defined types were placed in this encounter.      Meds This Visit:  Requested Prescriptions      No prescriptions requested or ordered in this encounter       Imaging & Referrals:  None      Nena Valdez PA-C   12/9/2024        This note was partially prepared using Dragon Medical voice recognition dictation software. As a result, errors may occur. When identified, these errors have been corrected. While every attempt is made to correct errors during dictation, discrepancies may still exist.          [1]   Allergies  Allergen Reactions    Codeine OTHER (SEE COMMENTS)     Sleep walks, hallucinations    Pineapple TONGUE SWELLING    Vicodin [Hydrocodone-Acetaminophen] NAUSEA AND VOMITING

## 2024-12-09 NOTE — PATIENT INSTRUCTIONS
Recommendations:  Gastric emptying study  -hold reglan day before and day of procedure  -hold mounjaro if restarted    -follow up with Talib Betancourt or Alesha Moody    Bowel movements  -Increase water intake to 64oz of water per day  -Increase fiber to 20-30 g per day with fruits, vegetables and whole grains  -Increase exercise to 150 mins/week  -Do not ignore urge to defecate  -Avoid artifical sugars  -If bowel habits change or constipation worsens, call our office sooner     WASHOUT INSTRUCTIONS:  1. Pick a day you will be at home, close to a toilet.  2. Have some juice for breakfast.   3. Around 8AM start drinking the solution prescribed (for MIRALAX instructions, see below) over the course of 3-5 hours. IF having nausea/bloating, take a break for 30 minutes, walk around and then resume drinking. If vomiting, take a break for 1 hour, if symptoms improve, and you feel well, can resume drinking.  4. Goal is to finish solution or until stools turn liquid & yellow.  5. For lunch you should have a liquid diet (7up, water, gingerale, gatorade, etc)  6. After prep, for dinner you can have a light dinner.  7. Resume regular meals the following day, along with laxative medications.  8. You should continue your regular medications during the washout day.       Mix the ENTIRE bottle of MiraLAX 8.3-8.9oz bottle (either 238 or 255 grams) into 64 ounces of Gatorade or another clear liquid (sports drink, apple juice, lemonade). Shake until MiraLAX is dissolved.  Drink one 8 ounce glass of the MiraLAX solution every 30 minutes until the solution is gone.  If you become nauseated or feel full, stop drinking for at least 30 minutes. Then resume the MiraLAX solution using smaller amounts   It is important to continue to drink clear liquids even after you have finished the MiraLAX to continue to flush your colon and ensure that you are staying well hydrated.

## 2024-12-17 ENCOUNTER — OFFICE VISIT (OUTPATIENT)
Dept: ENDOCRINOLOGY CLINIC | Facility: CLINIC | Age: 39
End: 2024-12-17
Payer: COMMERCIAL

## 2024-12-17 VITALS
SYSTOLIC BLOOD PRESSURE: 126 MMHG | HEIGHT: 64 IN | WEIGHT: 236.81 LBS | HEART RATE: 86 BPM | BODY MASS INDEX: 40.43 KG/M2 | DIASTOLIC BLOOD PRESSURE: 83 MMHG

## 2024-12-17 DIAGNOSIS — E11.9 TYPE 2 DIABETES MELLITUS WITHOUT COMPLICATION, WITHOUT LONG-TERM CURRENT USE OF INSULIN (HCC): ICD-10-CM

## 2024-12-17 DIAGNOSIS — E78.5 DYSLIPIDEMIA: Primary | ICD-10-CM

## 2024-12-17 LAB
GLUCOSE BLOOD: 236
TEST STRIP LOT #: NORMAL NUMERIC

## 2024-12-17 PROCEDURE — 99213 OFFICE O/P EST LOW 20 MIN: CPT | Performed by: INTERNAL MEDICINE

## 2024-12-17 PROCEDURE — 82947 ASSAY GLUCOSE BLOOD QUANT: CPT | Performed by: INTERNAL MEDICINE

## 2024-12-17 RX ORDER — ACYCLOVIR 400 MG/1
1 TABLET ORAL
Qty: 9 EACH | Refills: 0 | Status: SHIPPED | OUTPATIENT
Start: 2024-12-17

## 2024-12-17 NOTE — TELEPHONE ENCOUNTER
Refill request is for a maintenance medication and has met the criteria specified in the Ambulatory Medication Refill Standing Order for eligibility, visits, laboratory, alerts and was sent to the requested pharmacy.    Requested Prescriptions     Signed Prescriptions Disp Refills    OMEPRAZOLE 20 MG Oral Capsule Delayed Release 90 capsule 3     Sig: TAKE 1 CAPSULE BY MOUTH EVERY DAY     Authorizing Provider: GERRI WADE     Ordering User: JANA RAMOS

## 2024-12-17 NOTE — PROGRESS NOTES
Return Office Visit - Diabetes    CHIEF COMPLAINT:    DM   Dyslipidemia      HISTORY OF PRESENT ILLNESS:   Anjana Raymundo is a 39 year old female who presents for follow up for diabetes.        DM HISTORY  Diagnosed: at age 29    FH of DM: Parents have DM.      HISTORY OF DIABETES COMPLICATIONS: :  History of Retinopathy: No, summer 2024  History of Neuropathy: No   History of Nephropathy: No     ASSOCIATED COMPLICATIONS:    HTN: Yes  Hyperlipidemia: Yes  Coronary Artery Disease:  No   Cerebrovascular Disease: No       HOME GLUCOSE READINGS:     Not checking on a regular basis--> encouraged to start checking  Will like to get the dexcom --. Sent     CURRENT DIABETIC MEDICATIONS INCLUDE:    Glimepiride 8 mg daily, MTF 1000 mg BID, Farxiga 10 mg daily    MEALS:  Moderate compliance      EXERCISE:  No regular exercise, but has been trying to stay active    She has regular cycles  She is not sexually active at this time  No plans to get pregnant in the future    CURRENT MEDICATIONS:    Current Outpatient Medications   Medication Sig Dispense Refill    semaglutide 2 MG/3ML Subcutaneous Solution Pen-injector Inject 0.25 mg into the skin every 7 days for 14 days, THEN 0.5 mg every 7 days. 9 mL 0    Continuous Glucose Sensor (DEXCOM G7 SENSOR) Does not apply Misc 1 each Every 10 days. 9 each 0    OMEPRAZOLE 20 MG Oral Capsule Delayed Release TAKE 1 CAPSULE BY MOUTH EVERY DAY 90 capsule 3    loratadine 10 MG Oral Tab Take 1 tablet (10 mg total) by mouth daily.      Biotin 5000 MCG Oral Tab Take 1 tablet (5 mg total) by mouth daily.      metoclopramide 5 MG Oral Tab Take 1 tablet (5 mg total) by mouth 3 (three) times daily before meals. (Patient not taking: Reported on 12/9/2024) 30 tablet 0    Prenatal Vit-Fe Fumarate-FA (PRENATAL VITAMIN OR) Take 1 tablet by mouth daily.      dapagliflozin (FARXIGA) 10 MG Oral Tab Take 1 tablet (10 mg total) by mouth daily. 90 tablet 1    atorvastatin 40 MG Oral Tab Take 1 tablet (40  mg total) by mouth nightly. 90 tablet 1    buPROPion  MG Oral Tablet 24 Hr Take 1 tablet (150 mg total) by mouth daily. 90 tablet 3    metFORMIN HCl 1000 MG Oral Tab TAKE 1 TABLET BY MOUTH TWICE A  tablet 1    glimepiride 4 MG Oral Tab Take 2 tablets (8 mg total) by mouth before breakfast. 180 tablet 1    Glucose Blood (ONETOUCH VERIO) In Vitro Strip Use to check blood sugar daily. 100 strip 2    lisinopril 2.5 MG Oral Tab TAKE 1 TABLET BY MOUTH EVERY DAY 90 tablet 3    venlafaxine ER 75 MG Oral Capsule SR 24 Hr TAKE 1 CAPSULE BY MOUTH EVERY DAY 90 capsule 3    ALPRAZolam (XANAX) 0.25 MG Oral Tab Take 1 tablet (0.25 mg total) by mouth nightly as needed. 30 tablet 0    aspirin 81 MG Oral Tab Take 1 tablet (81 mg total) by mouth daily.         PAST MEDICAL, SOCIAL AND FAMILY HISTORY:  See past medical history marked as reviewed.  See past surgical history marked as reviewed.  See past family history marked as reviewed.  See past social history marked as reviewed.    ASSESSMENTS:      REVIEW OF SYSTEMS:  Constitutional: Negative for:  fever, fatigue, cold/heat intolerance, +  weight gain  Eyes: Negative for:  Visual changes, proptosis, blurring  ENT: Negative for:  dysphagia, neck swelling, dysphonia  Respiratory: Negative for:  dyspnea, cough  Cardiovascular: Negative for:  chest pain, palpitations, orthopnea  GI: Negative for:  abdominal pain, nausea, vomiting, diarrhea, constipation, bleeding  Neurology: Negative for: headache, numbness, weakness  Genito-Urinary: Negative for: dysuria, frequency  Psychiatric: Negative for:  depression, anxiety  Hematology/Lymphatics: Negative for: bruising, lower extremity edema  Endocrine: Negative for: polyuria, polydypsia  Skin: Negative for: rash, blister, cellulitis,      PHYSICAL EXAM:     Vitals reviewed    General Appearance:  alert, well developed, in no acute distress  Head: Atraumatic  Eyes:  normal conjunctivae, sclera., normal sclera and normal  pupils  Throat/Neck: normal sound to voice. Normal hearing, normal speech  Respiratory:  Speaking in full sentences, non-labored. no increased work of breathing, no audible wheezing    Neuro: motor grossly intact, moving all extremities without difficulty  Psychiatric:  oriented to time, self, and place  Extremities:   Bilateral barefoot skin diabetic exam is normal, visualized feet and the appearance is normal.  Bilateral monofilament/sensation of both feet is normal.  Pulsation pedal pulse exam of both lower legs/feet is normal as well.      DATA:     BG reviewed.     a1c is 6.1 % ( 12/2024)        ASSESSMENT AND PLAN:                1. Type 2 DM:      a). Medications:    She had recent hospitalization for intractable vomiting   Reviewed notes  \" Her symptoms did not seem to correlate with when she took the Mounjaro each week, and she has been on this medication \"   Noted h/o marijuana use --> counseled patient regarding this , since this can also cause GI symptoms  EGD: no pathology  Lipase: normal    Patient has gained weight and states that her cravings are back since stopping mounjaro  Discussed trying ozempic, she is agreeable  Knows to stop medication and call me right away if she has Any SE    -c/w Metformin 1000 mg BID  Take with food  GI SE reviewed      - c/w glimepiride  8 mg daily  She has been counseled regarding r/o hypoglycemia.     - ozempic 0.25 mg weekly x 2 weeks, 0.5 mg weekly if doing okay on the lower dose   SE and CI dicussed  No personal or family history of MEN syndrome  Patient counselled regarding side effects including injection site reactions, nausea, vomiting, diarrhea, pancreatitis, gastroparesis and rare side effect colten Anthony syndrome.    - c/w farxiga 10 mg daily  No side effects including UTI or fungal infections.     Check and call with sugars as discussed    b). No nephropathy  c). Up to date with eye exam.  d). Foot exam: Daily feet exam explained  e). BG log maintainence  explained in great detail, to get log and glucometer on next visit.  f). Life style changes discussed  g). Hypoglycemia recognition and management discussed    2. Dyslipidemia  LDL is normal  A) Discussed lifestyle modifications including reductions in dietary total and saturated fat, weight loss, aerobic exercise, and eating a diet rich in fruits and vegetables.  B) Statin therapy: continue with atorvastatin 40 mg daily.  Discussed SE.             RTC in 4 -5 months    Call with BG as dicussed.   Labs have been ordered by PCP       Orders Placed This Encounter   Procedures    POC HemoCue Glucose 201 (Finger stick glucose)

## 2024-12-24 ENCOUNTER — HOSPITAL ENCOUNTER (OUTPATIENT)
Dept: NUCLEAR MEDICINE | Facility: HOSPITAL | Age: 39
Discharge: HOME | End: 2024-12-24
Attending: INTERNAL MEDICINE
Payer: COMMERCIAL

## 2024-12-24 DIAGNOSIS — R11.2 NAUSEA AND VOMITING, UNSPECIFIED VOMITING TYPE: ICD-10-CM

## 2024-12-24 PROCEDURE — 78264 GASTRIC EMPTYING IMG STUDY: CPT | Performed by: INTERNAL MEDICINE

## 2025-01-26 DIAGNOSIS — E11.9 TYPE 2 DIABETES MELLITUS WITHOUT COMPLICATION, WITHOUT LONG-TERM CURRENT USE OF INSULIN (HCC): ICD-10-CM

## 2025-01-26 RX ORDER — GLIMEPIRIDE 4 MG/1
8 TABLET ORAL
Qty: 180 TABLET | Refills: 1 | Status: SHIPPED | OUTPATIENT
Start: 2025-01-26

## 2025-02-26 RX ORDER — ACYCLOVIR 400 MG/1
1 TABLET ORAL
Qty: 9 EACH | Refills: 1 | Status: SHIPPED | OUTPATIENT
Start: 2025-02-26

## 2025-02-26 NOTE — TELEPHONE ENCOUNTER
Endocrine Refill protocol for CGM supplies     Protocol Criteria:  PASSED Reason: N/A    If below requirement is met, send a 90-day supply with 1 refill per provider protocol.     Verify appointment with Endocrinology completed in the last 12 months or scheduled in the next 6 months     Last completed office visit:12/17/2024 Damaris Balderas MD   Next scheduled Follow up:   Future Appointments   Date Time Provider Department Center   3/22/2025 10:15 AM [facility] [place]

## 2025-03-04 RX ORDER — SEMAGLUTIDE 0.68 MG/ML
0.5 INJECTION, SOLUTION SUBCUTANEOUS WEEKLY
Qty: 9 ML | Refills: 0 | Status: SHIPPED | OUTPATIENT
Start: 2025-03-04

## 2025-03-04 NOTE — TELEPHONE ENCOUNTER
Endocrine Refill protocol for oral and injectable diabetic medications    Protocol Criteria:  PASSED  Reason: N/A    If all below requirements are met, send a 90-day supply with 1 refill per provider protocol.    Verify appointment with Endocrinology completed in the last 6 months or scheduled in the next 3 months.  Verify A1C has been completed within the last 6 months and is below 8.5%     Last completed office visit: 12/17/2024 Damaris Bladeras MD   Next scheduled Follow up:   Future Appointments   Date Time Provider Department Center   3/22/2025 10:15 AM Damaris Balderas MD [facility]   3/29/2025  1:20 PM [facility] RM1 [facility] EM [facility]      Last A1c result: Last A1c value was 6.1% done 11/19/2024.

## 2025-03-05 NOTE — LETTER
[facility]  Administration of Anesthesia  I, Anjana Raymundo agree to be cared for by a physician anesthesiologist alone and/or with a nurse anesthetist, who is specially trained to monitor me and give me medicine to put me to sleep or keep me comfortable during my procedure    I understand that my anesthesiologist and/or anesthetist is not an employee or agent of [facility] or [employer]. He or she works for [facility]    As the patient asking for anesthesia services, I agree to:  Allow the anesthesiologist (anesthesia doctor) to give me medicine and do additional procedures as necessary. Some examples are: Starting or using an “IV” to give me medicine, fluids or blood during my procedure, and having a breathing tube placed to help me breathe when I’m asleep (intubation). In the event that my heart stops working properly, I understand that my anesthesiologist will make every effort to sustain my life, unless otherwise directed by [facility] Do Not Resuscitate documents.  Tell my anesthesia doctor before my procedure:  If I am pregnant.  The last time that I ate or drank.  iii. All of the medicines I take (including prescriptions, herbal supplements, and pills I can buy without a prescription (including street drugs/illegal medications). Failure to inform my anesthesiologist about these medicines may increase my risk of anesthetic complications.  iv.If I am allergic to anything or have had a reaction to anesthesia before.  I understand how the anesthesia medicine will help me (benefits).  I understand that with any type of anesthesia medicine there are risks:  The most common risks are: nausea, vomiting, sore throat, muscle soreness, damage to my eyes, mouth, or teeth (from breathing tube placement).  Rare risks include: remembering what happened during my procedure, allergic reactions to medications, injury to my airway, heart, lungs, vision, nerves, or  "    Subjective     Ynes Pelaez is a 52 y.o. female who presents for evaluation of left breast lobular carcinoma in situ.  She reports a new palpable cyst in the left lateral breast for about the past 7 months.    Routine self breast exams: Yes  Breast pain: Yes (cyclic with menstrual cycle, longstanding)  Nipple discharge: No   Skin changes: No   Masses: No   Contour/nipple changes: No   Previous breast biopsy or surgery: Yes  - Benign biopsy and cyst aspiration ~  - Left stereotactic CNBx 2020: LCIS  - Left excisional biopsy 2021 ([facility] [place]): ALH  - Left US-guided CNBx 2024: LCIS    Age at menarche: 12  Age at menopause: perimenopausal  Age at first birth: 27    Hormone replacement therapy: No     Family history of cancer:   - Breast cancer: sister age 44 (BRCA1/2 negative), paternal grandfather age 86  - Ovarian cancer: paternal grandmother age 67  - Prostate cancer: Father age 52, paternal grandfather age 84  The patient underwent panel testing ([email or web address]) 2021 that was negative for deleterious mutation.    Lifetime (5-yr) breast cancer risk calculations  Tyrer-Cuzick v7: 52.89% (10.98%)  Tyrer-Cuzick v8: 44.43% (8.71%)  Paulina model: N/A    Imaging  - Bilateral diagnostic mammogram ([facility]) 2024: Left posterolateral new ~2cm mass. Left 03:00 and 01:00 additional asymmetries. Right breast no suspicious findings. BIRADS 4, density B.  - Left limited ultrasound ([facility]) 2024: Left benign cysts.  Left 02:00 4cmFN 0.8cm hypoechoic lesion with lobulation, BIRADS 4.   All imaging personally reviewed (internal and external images).    Pathology  Left breast US-guided CNBx (CT) 2024: Lobular carcinoma in situ with calcifications.  - Concordant. \"Petite\" marker.    Past Medical History   Past Medical History:   Diagnosis Date    Hyperlipidemia        Surgical History  Past Surgical History:   Procedure Laterality Date    CHOLECYSTECTOMY      PRIMARY C " SECTION      x2       Family History  Family History   Problem Relation Age of Onset    Prostate cancer Father 53    Breast Cancer Sister 41    Ovarian Cancer Paternal Grandmother 60 - 69       Social History  Social History     Socioeconomic History    Marital status: Single     Spouse name: Not on file    Number of children: Not on file    Years of education: Not on file    Highest education level: Not on file   Occupational History    Not on file   Tobacco Use    Smoking status: Never    Smokeless tobacco: Never   Vaping Use    Vaping status: Never Used   Substance and Sexual Activity    Alcohol use: Yes     Comment: 4 per week    Drug use: Not Currently    Sexual activity: Not on file   Other Topics Concern    Not on file   Social History Narrative    Not on file     Social Drivers of Health     Financial Resource Strain: Low Risk  (1/19/2023)    Received from [facility]    Overall Financial Resource Strain (CARDIA)     Difficulty of Paying Living Expenses: Not hard at all   Food Insecurity: No Food Insecurity (1/19/2023)    Received from [facility]    Hunger Vital Sign     Worried About Running Out of Food in the Last Year: Never true     Ran Out of Food in the Last Year: Never true   Transportation Needs: No Transportation Needs (1/19/2023)    Received from [facility]    PRAPARE - Transportation     Lack of Transportation (Medical): No     Lack of Transportation (Non-Medical): No   Physical Activity: Unknown (10/26/2021)    Received from [facility]    Exercise Vital Sign     Days of Exercise per Week: Patient declined     Minutes of Exercise per Session: Patient declined   Stress: Unknown (10/26/2021)    Received from [facility]    [employer] [place] [employer] - Occupational Stress Questionnaire     Feeling of Stress : Patient declined   Social Connections: Unknown (10/26/2021)    Received from [facility]     muscles and in extremely rare instances death.  My doctor has explained to me other choices available to me for my care (alternatives).  Pregnant Patients (“epidural”):  I understand that the risks of having an epidural (medicine given into my back to help control pain during labor), include itching, low blood pressure, difficulty urinating, headache or slowing of the baby’s heart. Very rare risks include infection, bleeding, seizure, irregular heart rhythms and nerve injury.  Regional Anesthesia (“spinal”, “epidural”, & “nerve blocks”):  I understand that rare but potential complications include headache, bleeding, infection, seizure, irregular heart rhythms, and nerve injury.    _____________________________________________________________________________  Patient (or Representative) Signature/Relationship to Patient  Date   Time    _____________________________________________________________________________   Name (if used)    Language/Organization   Time    _____________________________________________________________________________  Nurse Anesthetist Signature     Date   Time  _____________________________________________________________________________  Anesthesiologist Signature     Date   Time  I have discussed the procedure and information above with the patient (or patient’s representative) and answered their questions. The patient or their representative has agreed to have anesthesia services.    _____________________________________________________________________________  Witness        Date   Time  I have verified that the signature is that of the patient or patient’s representative, and that it was signed before the procedure  Patient Name: Anjana Raymundo     : 3/30/1985                 Printed: 2024 at 12:18 PM    Medical Record #: [id number]                                            Page 1 of 1  ----------ANESTHESIA CONSENT----------     "Social Connection and Isolation Panel [NHANES]     Frequency of Communication with Friends and Family: Patient declined     Frequency of Social Gatherings with Friends and Family: Patient declined     Attends Quaker Services: Patient declined     Active Member of Clubs or Organizations: Patient declined     Attends Club or Organization Meetings: Patient declined     Marital Status: Patient declined   Intimate Partner Violence: Not on file   Housing Stability: Unknown (7/19/2022)    Received from [facility]    Housing Stability Vital Sign     Unable to Pay for Housing in the Last Year: No     Number of Places Lived in the Last Year: Not on file     Unstable Housing in the Last Year: No     Review of Systems  Review of Systems   Eyes:  Positive for eye problems.   Endocrine: Positive for hot flashes.   Musculoskeletal:  Positive for arthralgias.   Neurological:  Positive for dizziness.   All other systems reviewed and are negative.       Objective   BP (!) 147/80 (BP Location: Left arm, Patient Position: Sitting, BP Cuff Size: Large adult)   Pulse 67   Temp 36.3 °C (97.4 °F) (Temporal)   Ht 1.575 m (5' 2\")   Wt 73.5 kg (162 lb)   SpO2 95%   BMI 29.63 kg/m²    Physical Exam  Vitals and nursing note reviewed.   Constitutional:       General: She is not in acute distress.     Appearance: Normal appearance.   HENT:      Head: Normocephalic and atraumatic.      Right Ear: External ear normal.      Left Ear: External ear normal.      Nose: Nose normal.      Mouth/Throat:      Pharynx: Oropharynx is clear.   Eyes:      General: No scleral icterus.     Conjunctiva/sclera: Conjunctivae normal.   Cardiovascular:      Rate and Rhythm: Normal rate and regular rhythm.      Heart sounds: Normal heart sounds. No murmur heard.     No friction rub. No gallop.   Pulmonary:      Effort: Pulmonary effort is normal. No respiratory distress.      Breath sounds: Normal breath sounds. No wheezing, rhonchi or rales. "   Chest:   Breasts:     Rodríguez Score is 5.      Breasts are symmetrical.      Right: Normal. No swelling, bleeding, inverted nipple, mass, nipple discharge or skin change.      Left: Normal. No swelling, bleeding, inverted nipple, mass, nipple discharge or skin change.          Comments: Bilateral breasts examined in the upright and supine positions.  No suspicious skin changes (erythema, peau d'orange).  No unexpected contour abnormalities.  Bilateral breast tissue heterogeneously dense with asymmetric density and tissue thickening in the left lateral breast; no discrete mass.  Bilateral nipples everted without expressible discharge.  No palpable cervical, supraclavicular, or axillary adenopathy bilaterally.  Bedside ultrasound performed with the [employer] 12mHz linear probe confirming dense glandular tissue in the left lateral breast with a ~2cm cyst lying within, explaining the palpable findings.  I am not clearly able to identify the biopsy site from September.  Abdominal:      General: Abdomen is flat. There is no distension.      Palpations: Abdomen is soft. There is no mass.   Musculoskeletal:         General: No swelling or deformity. Normal range of motion.      Cervical back: Neck supple.   Lymphadenopathy:      Cervical: No cervical adenopathy.      Upper Body:      Right upper body: No supraclavicular or axillary adenopathy.      Left upper body: No supraclavicular or axillary adenopathy.   Skin:     General: Skin is warm and dry.      Capillary Refill: Capillary refill takes less than 2 seconds.   Neurological:      General: No focal deficit present.      Mental Status: She is alert and oriented to person, place, and time.   Psychiatric:         Mood and Affect: Mood normal.         Behavior: Behavior normal.         Thought Content: Thought content normal.         Judgment: Judgment normal.       [facility] Status categories: 0= Fully active, able to carry on all pre-disease performance without  restriction.  FUNCTIONAL ASSESSMENT  Patient responses to questions:  - Have you ever had shoulder surgery or been treated for a shoulder injury that resulted in a loss of range of motion?  No   - Are you unable to reach into an upper cabinet and retrieve a cup or plate?  No   - Do you have any limitations in daily activities because of your shoulder?  No   Overhead raise test for shoulder flexion:  Normal Range:  150-180 degrees    Assessment & Plan   The patient is a delightful 52 year old female with a diagnosis of left breast LCIS.  We discussed this diagnosis in detail.  In the literature, upstaging rates are highly variable for lobular neoplasia.  However, newer literature suggest that the likelihood of upstaging is low (<5 %) with small volume lobular neoplasia in the setting of imaging-pathologic concordance.  Therefore, when these criteria are met and there are no other indications for excision (such as ADH, papilloma, radial scar, malignancy, pleomorphic or florid-type LCIS), watchful waiting with high risk surveillance is a very reasonable option.      - Genetic counseling and testing: The patient meets NCCN criteria for genetic counseling and testing.     - She underwent CancerNext panel testing in 03/2021 that was negative for deleterious mutation (report scanned into Media today).  - Consideration of chemoprevention: The patient meets criteria for consideration of chemoprevention to reduce the risk of developing breast cancer.     - She is established with Dr Jordan of medical oncology at [facility] and has thus far declined chemoprevention.  We had a long discussion about this today and reviewed her risk calculations and the indications for chemoprevention.  We discussed the option of BABY-CLEMONS (low dose tamoxifen for risk reduction with fewer side effects); she is potentially open to this and will discuss with Dr Jordan at her next appointment.  - High risk surveillance: The patient meets criteria for  high risk surveillance.  This consists of clinical breast exam every 6 months with annual mammograms alternating with annual MRIs every 6 months.  We discussed the sensitivity/specificity of MRI, the potential for needing benign biopsies (false positive results), and the benefit of detecting breast cancer as soon as possible.  All questions answered in detail.     - Next clinical breast exam: 2025, 2026, 2026, 2027     - Next breast imagin2025 B dMMG, 2026 MRI, 2026 B sMMG, 2027 MRI    A total of 65 minutes were spent on and with this patient today, including review of records, independent review of imaging, history and physical exam, counseling, documentation of exam, and coordination of care.  She was given a printed copy of her Volpara risk calculation document.    Problem   Mass of Upper Outer Quadrant of Left Breast   Acute Left-Sided Low Back Pain With Left-Sided Sciatica   Dyslipidemia   Dense Breast Tissue   Allergic Rhinitis   Exercise-Induced Asthma   Herpesvirus Infection   Lobular Carcinoma in Situ (Lcis) of Left Breast    Left breast lobular carcinoma in situ and atypical lobular hyperplasia  - Left stereotactic CNBx ([place]) 2020: LCIS  - Left excisional biopsy ([place]) 2021: ALH  - Left US-guided CNBx ([facility]) 2024: LCIS    Declined chemoprevention ([facility]agar Jordan)     Vitamin D Deficiency

## 2025-03-22 ENCOUNTER — OFFICE VISIT (OUTPATIENT)
Dept: ENDOCRINOLOGY CLINIC | Facility: CLINIC | Age: 40
End: 2025-03-22
Payer: COMMERCIAL

## 2025-03-22 VITALS
WEIGHT: 235 LBS | DIASTOLIC BLOOD PRESSURE: 80 MMHG | SYSTOLIC BLOOD PRESSURE: 122 MMHG | BODY MASS INDEX: 40.12 KG/M2 | HEART RATE: 89 BPM | HEIGHT: 64 IN

## 2025-03-22 DIAGNOSIS — E78.5 DYSLIPIDEMIA: Primary | ICD-10-CM

## 2025-03-22 DIAGNOSIS — E11.69 TYPE 2 DIABETES MELLITUS WITH OTHER SPECIFIED COMPLICATION, WITHOUT LONG-TERM CURRENT USE OF INSULIN (HCC): ICD-10-CM

## 2025-03-22 LAB
GLUCOSE BLOOD: 160
HEMOGLOBIN A1C: 6 % (ref 4.3–5.6)
TEST STRIP LOT #: NORMAL NUMERIC

## 2025-03-22 RX ORDER — SEMAGLUTIDE 1.34 MG/ML
1 INJECTION, SOLUTION SUBCUTANEOUS WEEKLY
Qty: 9 ML | Refills: 0 | Status: SHIPPED | OUTPATIENT
Start: 2025-03-22

## 2025-03-22 NOTE — PROGRESS NOTES
-----------------------------  Dexcom Clarity  -----------------------------  Anjana Raymundo    YOB: 1985    Generated at: Sat, Mar 22, 2025 10:29 AM CDT    Reporting period: Fri Feb 21, 2025 - Sat Mar 22, 2025  -----------------------------  Glucose Details    Average glucose: 128 mg/dL    GMI: 6.4%    Standard deviation: 31 mg/dL    Coefficient of Variation: 24.1%  -----------------------------  Time in Range    Very High: 0%    High: 7%    In Range: 93%    Low: 0%    Very Low: 0%    Target Range   mg/dL    -----------------------------  Sensor usage    Days with data: 29/30    Time active: 95%    Avg. calibrations per day: 0.0

## 2025-03-22 NOTE — PROGRESS NOTES
Return Office Visit - Diabetes    CHIEF COMPLAINT:    DM   Dyslipidemia      HISTORY OF PRESENT ILLNESS:   Anjana Raymundo is a 39 year old female who presents for follow up for diabetes.        DM HISTORY  Diagnosed: at age 29    FH of DM: Parents have DM.      HISTORY OF DIABETES COMPLICATIONS: :  History of Retinopathy: No, Nov, 2024  History of Neuropathy: No   History of Nephropathy: No     ASSOCIATED COMPLICATIONS:    HTN: Yes  Hyperlipidemia: Yes  Coronary Artery Disease:  No   Cerebrovascular Disease: No       HOME GLUCOSE READINGS:     CGM as below    CURRENT DIABETIC MEDICATIONS INCLUDE:    Glimepiride 4 mg daily, MTF 1000 mg BID, Farxiga 10 mg daily  Ozempic 0.5 mg weekly --> tolerating it well    MEALS:  Moderate compliance      EXERCISE:  No regular exercise, but has been trying to stay active    She has regular cycles  She is not sexually active at this time  No plans to get pregnant in the future    CURRENT MEDICATIONS:    Current Outpatient Medications   Medication Sig Dispense Refill    METFORMIN HCL 1000 MG Oral Tab TAKE 1 TABLET BY MOUTH TWICE A  tablet 1    semaglutide (OZEMPIC, 0.25 OR 0.5 MG/DOSE,) 2 MG/3ML Subcutaneous Solution Pen-injector Inject 0.5 mg into the skin once a week. 9 mL 0    glimepiride 4 MG Oral Tab Take 2 tablets (8 mg total) by mouth before breakfast. 180 tablet 1    dapagliflozin (FARXIGA) 10 MG Oral Tab Take 1 tablet (10 mg total) by mouth daily. 90 tablet 1    Continuous Glucose Sensor (DEXCOM G7 SENSOR) Does not apply Misc 1 EACH EVERY 10 DAYS. 9 each 1    OMEPRAZOLE 20 MG Oral Capsule Delayed Release TAKE 1 CAPSULE BY MOUTH EVERY DAY 90 capsule 3    loratadine 10 MG Oral Tab Take 1 tablet (10 mg total) by mouth daily.      Biotin 5000 MCG Oral Tab Take 1 tablet (5 mg total) by mouth daily.      metoclopramide 5 MG Oral Tab Take 1 tablet (5 mg total) by mouth 3 (three) times daily before meals. (Patient not taking: Reported on 12/9/2024) 30 tablet 0     Prenatal Vit-Fe Fumarate-FA (PRENATAL VITAMIN OR) Take 1 tablet by mouth daily.      atorvastatin 40 MG Oral Tab Take 1 tablet (40 mg total) by mouth nightly. 90 tablet 1    buPROPion  MG Oral Tablet 24 Hr Take 1 tablet (150 mg total) by mouth daily. 90 tablet 3    Glucose Blood (ONETOUCH VERIO) In Vitro Strip Use to check blood sugar daily. 100 strip 2    lisinopril 2.5 MG Oral Tab TAKE 1 TABLET BY MOUTH EVERY DAY 90 tablet 3    venlafaxine ER 75 MG Oral Capsule SR 24 Hr TAKE 1 CAPSULE BY MOUTH EVERY DAY 90 capsule 3    ALPRAZolam (XANAX) 0.25 MG Oral Tab Take 1 tablet (0.25 mg total) by mouth nightly as needed. 30 tablet 0    aspirin 81 MG Oral Tab Take 1 tablet (81 mg total) by mouth daily.         PAST MEDICAL, SOCIAL AND FAMILY HISTORY:  See past medical history marked as reviewed.  See past surgical history marked as reviewed.  See past family history marked as reviewed.  See past social history marked as reviewed.    ASSESSMENTS:      REVIEW OF SYSTEMS:  Constitutional: Negative for:  fever, fatigue, cold/heat intolerance, weight changes   Eyes: Negative for:  Visual changes, proptosis, blurring  ENT: Negative for:  dysphagia, neck swelling, dysphonia  Respiratory: Negative for:  dyspnea, cough  Cardiovascular: Negative for:  chest pain, palpitations, orthopnea  GI: Negative for:  abdominal pain, nausea, vomiting, diarrhea, constipation, bleeding  Neurology: Negative for: headache, numbness, weakness  Genito-Urinary: Negative for: dysuria, frequency  Psychiatric: Negative for:  depression, anxiety  Hematology/Lymphatics: Negative for: bruising, lower extremity edema  Endocrine: Negative for: polyuria, polydypsia  Skin: Negative for: rash, blister, cellulitis,      PHYSICAL EXAM:     Vitals reviewed    General Appearance:  alert, well developed, in no acute distress  Head: Atraumatic  Eyes:  normal conjunctivae, sclera., normal sclera and normal pupils  Throat/Neck: normal sound to voice. Normal hearing,  normal speech  Respiratory:  Speaking in full sentences, non-labored. no increased work of breathing, no audible wheezing    Neuro: motor grossly intact, moving all extremities without difficulty  Psychiatric:  oriented to time, self, and place  Extremities: March 2025   Bilateral barefoot skin diabetic exam is normal, visualized feet and the appearance is normal.  Bilateral monofilament/sensation of both feet is normal.  Pulsation pedal pulse exam of both lower legs/feet is normal as well.      DATA:     BG reviewed.     a1c is 6.0 % ( 3/2025 )        ASSESSMENT AND PLAN:                1. Type 2 DM:      a). Medications:        -c/w Metformin 1000 mg BID  Take with food  GI SE reviewed      - STOP amaryl     - ozempic 0.5 --> 1 mg weekly   SE and CI dicussed  No personal or family history of MEN syndrome  Patient counselled regarding side effects including injection site reactions, nausea, vomiting, diarrhea, pancreatitis, gastroparesis and rare side effect colten Anthony syndrome.    - c/w farxiga 10 mg daily  No side effects including UTI or fungal infections.     Check and call with sugars as discussed    b). No nephropathy  c). Up to date with eye exam.  d). Foot exam: Daily feet exam explained  e). BG log maintainence explained in great detail, to get log and glucometer on next visit.  f). Life style changes discussed  g). Hypoglycemia recognition and management discussed    2. Dyslipidemia  LDL is normal  A) Discussed lifestyle modifications including reductions in dietary total and saturated fat, weight loss, aerobic exercise, and eating a diet rich in fruits and vegetables.  B) Statin therapy: continue with atorvastatin 40 mg daily.  Discussed SE.             RTC in 4 -5 months    Call with BG as dicussed.   Fasting labs as below      Orders Placed This Encounter   Procedures    POC HemoCue Glucose 201 (Finger stick glucose)    POC Glycohemoglobin [53187]    Lipid Panel [E]    Microalb/Creat Ratio, Random  Urine [E]

## 2025-03-24 ENCOUNTER — TELEPHONE (OUTPATIENT)
Dept: ENDOCRINOLOGY CLINIC | Facility: CLINIC | Age: 40
End: 2025-03-24

## 2025-03-24 NOTE — TELEPHONE ENCOUNTER
Received a fax of patients most recent eye exam dated on 3/15/25 with Roma Thomas at [facility]. Eye exam was documented in patient's 'Diabetes Flowsheet' and placed in providers folder for review.

## 2025-03-29 ENCOUNTER — HOSPITAL ENCOUNTER (OUTPATIENT)
Dept: MAMMOGRAPHY | Facility: HOSPITAL | Age: 40
Discharge: HOME | End: 2025-03-29
Attending: INTERNAL MEDICINE
Payer: COMMERCIAL

## 2025-03-29 DIAGNOSIS — Z12.31 ENCOUNTER FOR SCREENING MAMMOGRAM FOR MALIGNANT NEOPLASM OF BREAST: ICD-10-CM

## 2025-03-29 PROCEDURE — 77067 SCR MAMMO BI INCL CAD: CPT | Performed by: INTERNAL MEDICINE

## 2025-03-29 PROCEDURE — 77063 BREAST TOMOSYNTHESIS BI: CPT | Performed by: INTERNAL MEDICINE

## 2025-04-11 DIAGNOSIS — E78.5 DYSLIPIDEMIA: ICD-10-CM

## 2025-04-11 RX ORDER — ATORVASTATIN CALCIUM 40 MG/1
40 TABLET, FILM COATED ORAL NIGHTLY
Qty: 90 TABLET | Refills: 1 | Status: SHIPPED | OUTPATIENT
Start: 2025-04-11

## 2025-04-11 NOTE — TELEPHONE ENCOUNTER
Endocrine refill protocol for lipid lowering medications    Protocol Criteria:  PASSED Reason: N/A    If all below requirements are met, send a 90-day supply with 1 refill per provider protocol.    Verify appointment with Endocrinology completed in the last 6 months or scheduled in the next 3 months.  Lipid panel must have been completed in the last 12 months   ALT result below 80  LDL result below 130    Last completed office visit:3/22/2025 Damaris Balderas MD   Next scheduled Follow up:   Future Appointments   Date Time Provider Department Center   4/15/2025  1:20 PM [facility]   8/16/2025  9:30 AM Damaris Balderas MD [facility]      Last Lipid panel date: Cholesterol: 139, done on 5/4/2024.  HDL Cholesterol: 54, done on 5/4/2024.  TriGlycerides 180, done on 5/4/2024.  LDL Cholesterol: 59, done on 5/4/2024.     Last ALT result: Last ALT was 19 done on 11/19/2024.  Last AST was 16 done on 11/19/2024.

## 2025-04-15 ENCOUNTER — HOSPITAL ENCOUNTER (OUTPATIENT)
Dept: ULTRASOUND IMAGING | Facility: HOSPITAL | Age: 40
Discharge: HOME | End: 2025-04-15
Attending: INTERNAL MEDICINE
Payer: COMMERCIAL

## 2025-04-15 ENCOUNTER — TELEPHONE (OUTPATIENT)
Dept: INTERNAL MEDICINE CLINIC | Facility: CLINIC | Age: 40
End: 2025-04-15

## 2025-04-15 ENCOUNTER — HOSPITAL ENCOUNTER (OUTPATIENT)
Dept: MAMMOGRAPHY | Facility: HOSPITAL | Age: 40
Discharge: HOME | End: 2025-04-15
Attending: INTERNAL MEDICINE
Payer: COMMERCIAL

## 2025-04-15 DIAGNOSIS — R92.8 ABNORMAL MAMMOGRAM: Primary | ICD-10-CM

## 2025-04-15 DIAGNOSIS — R92.8 ABNORMAL MAMMOGRAM: ICD-10-CM

## 2025-04-15 PROCEDURE — 77061 BREAST TOMOSYNTHESIS UNI: CPT | Performed by: INTERNAL MEDICINE

## 2025-04-15 PROCEDURE — 77065 DX MAMMO INCL CAD UNI: CPT | Performed by: INTERNAL MEDICINE

## 2025-04-15 PROCEDURE — 76642 ULTRASOUND BREAST LIMITED: CPT | Performed by: INTERNAL MEDICINE

## 2025-04-15 NOTE — TELEPHONE ENCOUNTER
Please notify pt that her additional breast imaging was benign, but would recommend R breast follow up imaging in 6 months

## 2025-04-27 ENCOUNTER — TELEPHONE (OUTPATIENT)
Dept: INTERNAL MEDICINE CLINIC | Facility: CLINIC | Age: 40
End: 2025-04-27

## 2025-04-27 DIAGNOSIS — Z00.00 ANNUAL PHYSICAL EXAM: Primary | ICD-10-CM

## 2025-04-27 DIAGNOSIS — F41.9 ANXIETY: ICD-10-CM

## 2025-04-28 RX ORDER — VENLAFAXINE HYDROCHLORIDE 75 MG/1
75 CAPSULE, EXTENDED RELEASE ORAL DAILY
Qty: 90 CAPSULE | Refills: 0 | Status: SHIPPED | OUTPATIENT
Start: 2025-04-28

## 2025-05-04 LAB
ABSOLUTE BASOPHILS: 41 CELLS/UL (ref 0–200)
ABSOLUTE EOSINOPHILS: 109 CELLS/UL (ref 15–500)
ABSOLUTE LYMPHOCYTES: 1986 CELLS/UL (ref 850–3900)
ABSOLUTE MONOCYTES: 401 CELLS/UL (ref 200–950)
ABSOLUTE NEUTROPHILS: 4264 CELLS/UL (ref 1500–7800)
ALBUMIN/GLOBULIN RATIO: 1.8 (CALC) (ref 1–2.5)
ALBUMIN: 4.4 G/DL (ref 3.6–5.1)
ALKALINE PHOSPHATASE: 63 U/L (ref 31–125)
ALT: 15 U/L (ref 6–29)
AST: 12 U/L (ref 10–30)
BASOPHILS: 0.6 %
BILIRUBIN, TOTAL: 0.4 MG/DL (ref 0.2–1.2)
BUN: 12 MG/DL (ref 7–25)
CALCIUM: 9.5 MG/DL (ref 8.6–10.2)
CARBON DIOXIDE: 25 MMOL/L (ref 20–32)
CHLORIDE: 105 MMOL/L (ref 98–110)
CHOL/HDLC RATIO: 2.4 (CALC)
CHOLESTEROL, TOTAL: 126 MG/DL
CREATININE, RANDOM URINE: 71 MG/DL (ref 20–275)
CREATININE: 0.63 MG/DL (ref 0.5–0.99)
EGFR: 114 ML/MIN/1.73M2
EOSINOPHILS: 1.6 %
GLOBULIN: 2.4 G/DL (CALC) (ref 1.9–3.7)
GLUCOSE: 159 MG/DL (ref 65–99)
HDL CHOLESTEROL: 53 MG/DL
HEMATOCRIT: 46.2 % (ref 35–45)
HEMOGLOBIN: 14.9 G/DL (ref 11.7–15.5)
LDL-CHOLESTEROL: 53 MG/DL (CALC)
LYMPHOCYTES: 29.2 %
MCH: 27.6 PG (ref 27–33)
MCHC: 32.3 G/DL (ref 32–36)
MCV: 85.6 FL (ref 80–100)
MICROALBUMIN/CREATININE RATIO, RANDOM URINE: 4 MG/G CREAT
MICROALBUMIN: 0.3 MG/DL
MONOCYTES: 5.9 %
MPV: 10.3 FL (ref 7.5–12.5)
NEUTROPHILS: 62.7 %
NON-HDL CHOLESTEROL: 73 MG/DL (CALC)
PLATELET COUNT: 299 THOUSAND/UL (ref 140–400)
POTASSIUM: 4.4 MMOL/L (ref 3.5–5.3)
PROTEIN, TOTAL: 6.8 G/DL (ref 6.1–8.1)
RDW: 13.9 % (ref 11–15)
RED BLOOD CELL COUNT: 5.4 MILLION/UL (ref 3.8–5.1)
SODIUM: 140 MMOL/L (ref 135–146)
TRIGLYCERIDES: 123 MG/DL
TSH W/REFLEX TO FT4: 2.17 MIU/L
VITAMIN B12: 420 PG/ML (ref 200–1100)
VITAMIN D, 25-OH, TOTAL: 38 NG/ML (ref 30–100)
WHITE BLOOD CELL COUNT: 6.8 THOUSAND/UL (ref 3.8–10.8)

## 2025-05-06 ENCOUNTER — PATIENT MESSAGE (OUTPATIENT)
Age: 40
End: 2025-05-06

## 2025-05-06 ENCOUNTER — PATIENT MESSAGE (OUTPATIENT)
Dept: INTERNAL MEDICINE CLINIC | Facility: CLINIC | Age: 40
End: 2025-05-06

## 2025-05-06 DIAGNOSIS — E11.9 TYPE 2 DIABETES MELLITUS WITHOUT COMPLICATION, WITHOUT LONG-TERM CURRENT USE OF INSULIN (HCC): ICD-10-CM

## 2025-05-06 DIAGNOSIS — E78.5 DYSLIPIDEMIA: Primary | ICD-10-CM

## 2025-05-12 RX ORDER — DAPAGLIFLOZIN 10 MG/1
10 TABLET, FILM COATED ORAL DAILY
Qty: 90 TABLET | Refills: 0 | Status: SHIPPED | OUTPATIENT
Start: 2025-05-12

## 2025-05-12 NOTE — TELEPHONE ENCOUNTER
Refill request is for a maintenance medication and has met the criteria specified in the Ambulatory Medication Refill Standing Order for eligibility, visits, laboratory, alerts and was sent to the requested pharmacy.    Requested Prescriptions     Signed Prescriptions Disp Refills    dapagliflozin (FARXIGA) 10 MG Oral Tab 90 tablet 0     Sig: TAKE 1 TABLET BY MOUTH EVERY DAY     Authorizing Provider: GERRI WADE     Ordering User: MARCELA EDMOND

## 2025-05-13 ENCOUNTER — OFFICE VISIT (OUTPATIENT)
Dept: INTERNAL MEDICINE CLINIC | Facility: CLINIC | Age: 40
End: 2025-05-13

## 2025-05-13 VITALS
SYSTOLIC BLOOD PRESSURE: 122 MMHG | DIASTOLIC BLOOD PRESSURE: 70 MMHG | HEIGHT: 64 IN | OXYGEN SATURATION: 99 % | BODY MASS INDEX: 38.24 KG/M2 | WEIGHT: 224 LBS | RESPIRATION RATE: 16 BRPM | TEMPERATURE: 98 F | HEART RATE: 98 BPM

## 2025-05-13 DIAGNOSIS — E66.01 MORBID OBESITY WITH BMI OF 45.0-49.9, ADULT (HCC): ICD-10-CM

## 2025-05-13 DIAGNOSIS — Z23 NEED FOR VACCINATION: ICD-10-CM

## 2025-05-13 DIAGNOSIS — F41.9 ANXIETY: ICD-10-CM

## 2025-05-13 DIAGNOSIS — R92.8 ABNORMAL MAMMOGRAM: ICD-10-CM

## 2025-05-13 DIAGNOSIS — I10 ESSENTIAL HYPERTENSION: ICD-10-CM

## 2025-05-13 DIAGNOSIS — E11.9 TYPE 2 DIABETES MELLITUS WITHOUT COMPLICATION, WITHOUT LONG-TERM CURRENT USE OF INSULIN (HCC): ICD-10-CM

## 2025-05-13 DIAGNOSIS — Z00.00 ANNUAL PHYSICAL EXAM: Primary | ICD-10-CM

## 2025-05-13 DIAGNOSIS — E78.5 DYSLIPIDEMIA: ICD-10-CM

## 2025-05-13 PROCEDURE — 90471 IMMUNIZATION ADMIN: CPT | Performed by: INTERNAL MEDICINE

## 2025-05-13 PROCEDURE — 99396 PREV VISIT EST AGE 40-64: CPT | Performed by: INTERNAL MEDICINE

## 2025-05-13 PROCEDURE — 90677 PCV20 VACCINE IM: CPT | Performed by: INTERNAL MEDICINE

## 2025-05-13 NOTE — PROGRESS NOTES
Anjana Raymundo is a 40 year old female.  Chief Complaint   Patient presents with    Physical     ANNUAL PHYSICAL  Pap Smear  10/20/22, Mammogram 4/15/25  Discuss Mammogram          HPI:   Anjana Raymundo is a 40 year old female who presents for her annual physical examination.     Past medical history includes diabetes, dyslipidemia, obesity, GERD.   Follow with Dr. Balderas for her diabetes. Eye exam 7/2022.  Her GERD is well controlled.   She used to have asthma in high school but no longer has this.     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   Now doing better--back on mounjaro and feeling ok    Wt Readings from Last 6 Encounters:   05/13/25 224 lb (101.6 kg)   03/22/25 235 lb (106.6 kg)   12/17/24 236 lb 12.8 oz (107.4 kg)   12/09/24 228 lb (103.4 kg)   12/02/24 228 lb (103.4 kg)   11/19/24 220 lb 3.2 oz (99.9 kg)     Body mass index is 38.45 kg/m².       Current Outpatient Medications   Medication Sig Dispense Refill    Magnesium Glycinate 120 MG Oral Cap Take 1 capsule by mouth in the morning.      dapagliflozin (FARXIGA) 10 MG Oral Tab TAKE 1 TABLET BY MOUTH EVERY DAY 90 tablet 0    venlafaxine ER 75 MG Oral Capsule SR 24 Hr TAKE 1 CAPSULE BY MOUTH EVERY DAY 90 capsule 0    atorvastatin 40 MG Oral Tab Take 1 tablet (40 mg total) by mouth nightly. 90 tablet 1    semaglutide (OZEMPIC, 1 MG/DOSE,) 4 MG/3ML Subcutaneous Solution Pen-injector Inject 1 mg into the skin once a week. 9 mL 0    METFORMIN HCL 1000 MG Oral Tab TAKE 1 TABLET BY MOUTH TWICE A DAY (Patient taking differently: Take 1 tablet (1,000 mg total) by mouth 2 (two) times daily with meals. TAKE 1 TABLET BY MOUTH TWICE A DAY) 180 tablet 1    Continuous Glucose Sensor (DEXCOM G7 SENSOR) Does not apply Misc 1 EACH EVERY 10 DAYS. 9 each 1    OMEPRAZOLE 20 MG Oral Capsule Delayed Release TAKE 1 CAPSULE BY MOUTH EVERY DAY 90  capsule 3    loratadine 10 MG Oral Tab Take 1 tablet (10 mg total) by mouth daily.      Biotin 5000 MCG Oral Tab Take 1 tablet (5 mg total) by mouth daily.      Prenatal Vit-Fe Fumarate-FA (PRENATAL VITAMIN OR) Take 1 tablet by mouth daily.      Glucose Blood (ONETOUCH VERIO) In Vitro Strip Use to check blood sugar daily. 100 strip 2    lisinopril 2.5 MG Oral Tab TAKE 1 TABLET BY MOUTH EVERY DAY 90 tablet 3    ALPRAZolam (XANAX) 0.25 MG Oral Tab Take 1 tablet (0.25 mg total) by mouth nightly as needed. 30 tablet 0    aspirin 81 MG Oral Tab Take 1 tablet (81 mg total) by mouth daily.        Past Medical History:    Allergic rhinitis    Anesthesia complication    sedation didnt work. remembered entire procedure.    Anxiety    It just happens from time to time    Asthma (HCC)    in high school    Esophageal reflux    Learning disability    Obesity    Reflux    Type II or unspecified type diabetes mellitus without mention of complication, not stated as uncontrolled    Wears glasses      Past Surgical History:   Procedure Laterality Date    Ankle scope,extens debridemnt Right 09/16/2014    Procedure: ARTHROSCOPY ANKLE WITH DEBRIDEMENT;  Surgeon: Noah Morgan MD;  Location: [facility]    Egd  11/20/2024    DR. GUADALUPE @ [facility]. WITH BIOPSIES. DX: NORMAL EXAM    Gastrocnemius recession Right 09/16/2014    Procedure: GASTROC RECESSION FOOT;  Surgeon: Noah Morgan MD;  Location: [facility]    Hc implant ear tubes  1990    Molecular extraction (m11e.1)  2002    wisdom teeth.    Other surgical history      wisdom teeth removed    Other surgical history      bilateral tubes in ears    Other surgical history      R annkle surgery      Family History   Problem Relation Age of Onset    Diabetes Mother 40    Diabetes Father 40    Hypertension Father     Seizure Disorder Brother     Cancer Maternal Grandmother         unknown    Cancer Paternal Uncle         lung, smoker    Breast Cancer Neg     Ovarian Cancer Neg      Pancreatic Cancer Neg       Social History:   Social History     Socioeconomic History    Marital status: Life Partner   Tobacco Use    Smoking status: Former     Current packs/day: 0.00     Types: Cigarettes     Quit date: 3/25/2002     Years since quittin.1    Smokeless tobacco: Never    Tobacco comments:     0.5 pack q 2 wks   Vaping Use    Vaping status: Never Used   Substance and Sexual Activity    Alcohol use: Yes     Comment: Maybe 2 times a month    Drug use: Yes     Frequency: 7.0 times per week     Types: Cannabis   Other Topics Concern    Caffeine Concern Yes     Comment: Coffee - 1 cup per day      Social Drivers of Health     Food Insecurity: No Food Insecurity (2024)    Food Insecurity     Food Insecurity: Never true   Transportation Needs: No Transportation Needs (2024)    Transportation Needs     Lack of Transportation: No   Housing Stability: Low Risk  (2024)    Housing Stability     Housing Instability: No          REVIEW OF SYSTEMS:   GENERAL: feels well otherwise  SKIN: denies any unusual skin lesions  EYES:denies blurred vision or double vision  HEENT: denies nasal congestion, sinus pain, ST, sore throat  LUNGS: denies shortness of breath with exertion, cough or wheezing  BREAST: denies masses or nipple discharge  CARDIOVASCULAR: denies chest pain, pressure, or palpitations  GI: denies abdominal pain, nausea, vomiting, diarrhea, constipation, hematochezia, or melena  : denies dysuria, urinary frequency, vaginal discharge, dyspareunia, heavy menses  NEURO: denies headaches, dizziness, focal deficits  PSYCHE: + anxiety. Denies depression  EXT: denies edema      EXAM:   /70   Pulse 98   Temp 97.8 °F (36.6 °C) (Oral)   Resp 16   Ht 5' 4\" (1.626 m)   Wt 224 lb (101.6 kg)   LMP 2025 (Approximate)   SpO2 99%   BMI 38.45 kg/m²     GENERAL: well developed, well nourished, in no apparent distress  HEENT: normal oropharynx, normal TM's  EYES: PERRLA, EOMI,  conjunctivae are pink  NECK: supple, no cervical or supraclavicular LAD, no carotic bruits, no thyromegaly  BREAST: no dominant or suspicious mass, no axillary LAD  LUNGS: clear to auscultation  CARDIO: RRR, normal S1S2, no gallops or murmurs  GI: soft, NT, ND, NABS, no HSM  GYNE: deferred  EXTREMITIES: no cyanosis, clubbing or edema, +2 radial and DP pulses bilaterally  NEURO: A&O x 3, moves all 4 extremities spontaneously      ASSESSMENT AND PLAN:     1. Annual physical exam  Pap done 10/2022  Had updated HBV vaccines 2018  Given lab orders  Given prevnar today    2. Type 2 diabetes mellitus with microalbuminuria (HCC)  Follows with Dr. Balderas.  Lisinopril 2.5mg daily.   Eye exam due July      3. Essential hypertension with goal blood pressure less than 130/80  Lisinopril 2.5mg daily    4. Dyslipidemia  lipitor 40mg daily, LDL 53    5. GERD   pepcid bid  Omeprazole--will consider stopping after losing more weight    6. anxiety  effexor 75mg daily  Wellbutrin 150mg daily    7. Anemia  Hgb improved    8. Obesity  Congratulated on weight loss    9. Abnormal mammogram  Repeat due 10/2025    10. Fatty liver  Noted on imaging (CT)  On statin  On GLP-1    Gisel Swift DO

## 2025-05-14 RX ORDER — ATORVASTATIN CALCIUM 40 MG/1
40 TABLET, FILM COATED ORAL NIGHTLY
Qty: 90 TABLET | Refills: 3 | Status: SHIPPED | OUTPATIENT
Start: 2025-05-14

## 2025-05-14 RX ORDER — BUPROPION HYDROCHLORIDE 150 MG/1
150 TABLET ORAL DAILY
Qty: 90 TABLET | Refills: 3 | Status: SHIPPED | OUTPATIENT
Start: 2025-05-14

## 2025-05-14 RX ORDER — VENLAFAXINE HYDROCHLORIDE 75 MG/1
75 CAPSULE, EXTENDED RELEASE ORAL DAILY
Qty: 90 CAPSULE | Refills: 3 | Status: SHIPPED | OUTPATIENT
Start: 2025-05-14

## 2025-05-14 RX ORDER — LISINOPRIL 2.5 MG/1
2.5 TABLET ORAL DAILY
Qty: 90 TABLET | Refills: 3 | Status: SHIPPED | OUTPATIENT
Start: 2025-05-14

## 2025-05-14 RX ORDER — OMEPRAZOLE 20 MG/1
20 CAPSULE, DELAYED RELEASE ORAL DAILY
Qty: 90 CAPSULE | Refills: 3 | Status: SHIPPED | OUTPATIENT
Start: 2025-05-14

## 2025-06-16 RX ORDER — SEMAGLUTIDE 1.34 MG/ML
1 INJECTION, SOLUTION SUBCUTANEOUS
Qty: 9 ML | Refills: 0 | Status: SHIPPED | OUTPATIENT
Start: 2025-06-16

## 2025-06-23 ENCOUNTER — TELEPHONE (OUTPATIENT)
Dept: ENDOCRINOLOGY CLINIC | Facility: CLINIC | Age: 40
End: 2025-06-23

## 2025-06-23 RX ORDER — BLOOD SUGAR DIAGNOSTIC
STRIP MISCELLANEOUS
Qty: 100 EACH | Refills: 1 | Status: SHIPPED | OUTPATIENT
Start: 2025-06-23

## 2025-06-23 RX ORDER — BLOOD-GLUCOSE METER
1 EACH MISCELLANEOUS DAILY
Qty: 1 KIT | Refills: 0 | Status: SHIPPED | OUTPATIENT
Start: 2025-06-23

## 2025-06-23 NOTE — TELEPHONE ENCOUNTER
Received fax from [facility] stating pts preferred brand is now Accu-chek     Sent new script to pts pharmacy

## 2025-08-10 ENCOUNTER — PATIENT MESSAGE (OUTPATIENT)
Dept: ENDOCRINOLOGY CLINIC | Facility: CLINIC | Age: 40
End: 2025-08-10

## 2025-08-11 ENCOUNTER — TELEPHONE (OUTPATIENT)
Dept: INTERNAL MEDICINE CLINIC | Facility: CLINIC | Age: 40
End: 2025-08-11

## 2025-08-11 RX ORDER — DAPAGLIFLOZIN 10 MG/1
10 TABLET, FILM COATED ORAL DAILY
Qty: 90 TABLET | Refills: 0 | Status: SHIPPED | OUTPATIENT
Start: 2025-08-11

## 2025-08-21 RX ORDER — ACYCLOVIR 400 MG/1
1 TABLET ORAL
Qty: 9 EACH | Refills: 1 | Status: SHIPPED | OUTPATIENT
Start: 2025-08-21

## (undated) DIAGNOSIS — D64.9 ANEMIA, UNSPECIFIED TYPE: Primary | ICD-10-CM

## (undated) DIAGNOSIS — E11.9 TYPE 2 DIABETES MELLITUS WITHOUT COMPLICATION, WITHOUT LONG-TERM CURRENT USE OF INSULIN (HCC): ICD-10-CM

## (undated) DEVICE — CO2 CANNULA,SSOFT,ADLT,7O2,4CO2,FEMALE: Brand: MEDLINE

## (undated) DEVICE — KIT ENDO ORCAPOD 160/180/190

## (undated) DEVICE — CONMED SCOPE SAVER BITE BLOCK, 20X27 MM: Brand: SCOPE SAVER

## (undated) DEVICE — SYRINGE, LUER SLIP, STERILE, 60ML: Brand: MEDLINE

## (undated) DEVICE — GIJAW SINGLE-USE BIOPSY FORCEPS WITH NEEDLE: Brand: GIJAW

## (undated) DEVICE — YANKAUER,BULB TIP,W/O VENT,RIGID,STERILE: Brand: MEDLINE

## (undated) DEVICE — V2 SPECIMEN COLLECTION MANIFOLD KIT: Brand: NEPTUNE

## (undated) DEVICE — MEDI-VAC NON-CONDUCTIVE SUCTION TUBING: Brand: CARDINAL HEALTH

## (undated) DEVICE — KIT CLEAN ENDOKIT 1.1OZ GOWNX2

## (undated) DEVICE — MEDI-VAC NON-CONDUCTIVE SUCTION TUBING 6MM X 1.8M (6FT.) L: Brand: CARDINAL HEALTH

## (undated) NOTE — Clinical Note
Transitional Care Management call completed. A telephone encounter was sent to the office for an appointment request. Thank you.

## (undated) NOTE — ED AVS SNAPSHOT
[facility] Emergency Department    [place]     [place] [address]    Phone:  [phone]    Fax:  YOSVANY [address]   MRN: [id number]    Department:  [facility] Emergency Department   Date of Visit:  2/28/ Insulin Pen Needle 32G X 4 MM Misc   Quantity:  100 each   Commonly known as:  BD PEN NEEDLE MIGUEL U/F   Inject once daily            Where to Get Your Medications      You can get these medications from any pharmacy     Bring a paper prescription for each discretion of that Physician.   If you need additional assistance selecting a physician, you may call the [facility] Physician Referral and Class Registration line at [phone] or find a doctor online by visiting [email or web address].    IF THE Additional Information       We are concerned for your overall well being:    - If you are a smoker or have smoked in the last 12 months, we encourage you to explore options for quitting.     - If you have concerns related to behavioral health issues or th

## (undated) NOTE — MR AVS SNAPSHOT
[facility] [place]  [address] [place] [address]  [phone]               Thank you for choosing us for your health care visit with Maximus Dumont MD.  We are glad to serve you and happy to provide you with this summary of your visit.   Ple Commonly known as:  LIPITOR           Fluticasone Propionate 50 MCG/ACT Susp   2 sprays by Each Nare route daily.    Commonly known as:  FLONASE           glimepiride 4 MG Tabs   Take 2 tablets (8 mg total) by mouth every morning before breakfast.   Commonl Summaries. If you've been to the Emergency Department or your doctor's office, you can view your past visit information in [employer] [facility] by going to Visits < Visit Summaries. [employer] questions? Call [phone] for help.   [employer] is NOT to be used for urge

## (undated) NOTE — LETTER
[facility], [address] [facility] [facility], [place] [address] [address] [place] [address]  [phone]                 To Whom it may concern,         Finn Willis is a patient under my care. Please allow her to utilize the bathroom facilities at will as she has underlying conditions that require frequent bathroom utilization. Should you have any questions, please give my office a call to discuss.       Sincerely,   Dr. Sima Gloria

## (undated) NOTE — LETTER
4/7/23             [facility], Critical access hospitalJEANETTE COBIAN, RADHA  [address]  [facility] [place] [address]  Dept: [phone]    Karen Parker  3/30/1985    To Whom it may concern,      Karen Parker is a patient under my medical care. Please allow her to utilize the bathroom facilities as she has an underlying condition that requires frequent restroom breaks. Please allow for restroom breaks throughout her 5 day/week work schedule as needed. Break duration of 5-10 minutes. Should you have any questions, please give my office a call to discuss.         Sincerely,           Dr. Kade Savage, DO

## (undated) NOTE — ED AVS SNAPSHOT
[facility] Emergency Department    [place] [address]     [place] [address]    Phone:  [phone]    Fax:  YOSVANY [address]   MRN: [id number]    Department:  [facility] Emergency Department   Date of Visit:  2/28/ and Class Registration line at [phone] or find a doctor online by visiting [email or web address].    IF THERE IS ANY CHANGE OR WORSENING OF YOUR CONDITION, CALL YOUR PRIMARY CARE PHYSICIAN AT ONCE OR RETURN IMMEDIATELY TO [address] [place] [address].     If

## (undated) NOTE — MR AVS SNAPSHOT
[facility] [address] [facility] [address]  [phone]               Thank you for choosing us for your health care visit with Graciela Louise DO. We are glad to serve you and happy to provide you with this summary of your visit.   Please he Commonly known as:  LIPITOR           * Canagliflozin 300 MG Tabs   Take 1 tablet by mouth daily. Commonly known as:  INVOKANA           * INVOKANA 100 MG Tabs   Generic drug:  Canagliflozin   TAKE 1 TABLET BY MOUTH DAILY WITH DINNER.            Fluticaso medications prescribed for you. Read the directions carefully, and ask your doctor or other care provider to review them with you.             MyChart     Visit [facility]  You can access your MyChart to more actively manage your health care and view more deta

## (undated) NOTE — LETTER
[place] [address]  Authorization for Invasive Procedures  Date: 11/20/2024          Time: 11:54 AM    I hereby authorize Dr. Quin Bernal, my physician and his/her assistants (if applicable), which may include medical students, residents, and/or fellows, to perform the following surgical operation/ procedure and administer such anesthesia as may be determined necessary by my physician: Esophagogastroduodenoscopy on Anjana Raymundo  2.   I recognize that during the surgical operation/procedure, unforeseen conditions may necessitate additional or different procedures than those listed above.  I, therefore, further authorize and request that the above-named surgeon, assistants, or designees perform such procedures as are, in their judgment, necessary and desirable.    3.   My surgeon/physician has discussed prior to my surgery the potential benefits, risks and side effects of this procedure; the likelihood of achieving goals; and potential problems that might occur during recuperation.  They also discussed reasonable alternatives to the procedure, including risks, benefits, and side effects related to the alternatives and risks related to not receiving this procedure.  I have had all my questions answered and I acknowledge that no guarantee has been made as to the result that may be obtained.    4.   Should the need arise during my operation/procedure, which includes change of level of care prior to discharge, I also consent to the administration of blood and/or blood products.  Further, I understand that despite careful testing and screening of blood or blood products by collecting agencies, I may still be subject to ill effects as a result of receiving a blood transfusion and/or blood products.  The following are some, but not all, of the potential risks that can occur: fever and allergic reactions, hemolytic reactions, transmission of diseases such as Hepatitis, AIDS and Cytomegalovirus  (CMV) and fluid overload.  In the event that I wish to have an autologous transfusion of my own blood, or a directed donor transfusion, I will discuss this with my physician.   Check only if Refusing Blood or Blood Products  I understand refusal of blood or blood products as deemed necessary by my physician may have serious consequences to my condition to include possible death. I hereby assume responsibility for my refusal and release the hospital, its personnel, and my physicians from any responsibility for the consequences of my refusal.         o  Refuse         5.   I authorize the use of any specimen, organs, tissues, body parts or foreign objects that may be removed from my body during the operation/procedure for diagnosis, research or teaching purposes and their subsequent disposal by hospital authorities.  I also authorize the release of specimen test results and/or written reports to my treating physician on the hospital medical staff or other referring or consulting physicians involved in my care, at the discretion of the Pathologist or my treating physician.    6.   I consent to the photographing or videotaping of the operations or procedures to be performed, including appropriate portions of my body for medical, scientific, or educational purposes, provided my identity is not revealed by the pictures or by descriptive texts accompanying them.  If the procedure has been photographed/videotaped, the surgeon will obtain the original picture, image, videotape or CD.  The hospital will not be responsible for storage, release or maintenance of the picture, image, tape or CD.    7.   I consent to the presence of a  or observers in the operating room as deemed necessary by my physician or their designees.    8.   I recognize that in the event my procedure results in extended X-Ray/fluoroscopy time, I may develop a skin reaction.    9. If I have a Do Not Attempt Resuscitation (DNAR) order in  place, that status will be suspended while in the operating room, procedural suite, and during the recovery period unless otherwise explicitly stated by me (or a person authorized to consent on my behalf). The surgeon or my attending physician will determine when the applicable recovery period ends for purposes of reinstating the DNAR order.  10. Patients having a sterilization procedure: I understand that if the procedure is successful the results will be permanent and it will therefore be impossible for me to inseminate, conceive, or bear children.  I also understand that the procedure is intended to result in sterility, although the result has not been guaranteed.   11. I acknowledge that my physician has explained sedation/analgesia administration to me including the risk and benefits I consent to the administration of sedation/analgesia as may be necessary or desirable in the judgment of my physician.    I CERTIFY THAT I HAVE READ AND FULLY UNDERSTAND THE ABOVE CONSENT TO OPERATION and/or OTHER PROCEDURE.        ____________________________________       _________________________________      ______________________________  Signature of Patient         Signature of Responsible Person        Printed Name of Responsible Person        ____________________________________      _________________________________      ______________________________       Signature of Witness          Relationship to Patient                       Date                                       Time  Patient Name: Anjana Raymundo  : 3/30/1985    Reviewed: 2024   Printed: 2024  Medical Record #: [id number] Page 1 of 2             STATEMENT OF PHYSICIAN My signature below affirms that prior to the time of the procedure; I have explained to the patient and/or his/her legal representative, the risks and benefits involved in the proposed treatment and any reasonable alternative to the proposed treatment. I have also  explained the risks and benefits involved in refusal of the proposed treatment and alternatives to the proposed treatment and have answered the patient's questions. If I have a significant financial interest in a co-management agreement or a significant financial interest in any product or implant, or other significant relationship used in this procedure/surgery, I have disclosed this and had a discussion with my patient.     _______________________________________________________________ _____________________________  (Signature of Physician)                                                                                         (Date)                                   (Time)  Patient Name: Anjana Raymundo  : 3/30/1985    Reviewed: 2024   Printed: 2024  Medical Record #: [id number] Page 2 of 2

## (undated) NOTE — LETTER
[facility] [address], [place], [place]    Authorization for Surgical Operation and Procedure                               I hereby authorize Quin Bernal MD, my physician and his/her assistants (if applicable), which may include medical students, residents, and/or fellows, to perform the following surgical operation/ procedure and administer such anesthesia as may be determined necessary by my physician: Operation/Procedure name (s) ESOPHAGOGASTRODUODENOSCOPY (EGD) on Anjana Raymundo   2.   I recognize that during the surgical operation/procedure, unforeseen conditions may necessitate additional or different procedures than those listed above.  I, therefore, further authorize and request that the above-named surgeon, assistants, or designees perform such procedures as are, in their judgment, necessary and desirable.    3.   My surgeon/physician has discussed prior to my surgery the potential benefits, risks and side effects of this procedure; the likelihood of achieving goals; and potential problems that might occur during recuperation.  They also discussed reasonable alternatives to the procedure, including risks, benefits, and side effects related to the alternatives and risks related to not receiving this procedure.  I have had all my questions answered and I acknowledge that no guarantee has been made as to the result that may be obtained.    4.   Should the need arise during my operation/procedure, which includes change of level of care prior to discharge, I also consent to the administration of blood and/or blood products.  Further, I understand that despite careful testing and screening of blood or blood products by collecting agencies, I may still be subject to ill effects as a result of receiving a blood transfusion and/or blood products.  The following are some, but not all, of the potential risks that can occur: fever and allergic reactions, hemolytic reactions, transmission  of diseases such as Hepatitis, AIDS and Cytomegalovirus (CMV) and fluid overload.  In the event that I wish to have an autologous transfusion of my own blood, or a directed donor transfusion, I will discuss this with my physician.  Check only if Refusing Blood or Blood Products  I understand refusal of blood or blood products as deemed necessary by my physician may have serious consequences to my condition to include possible death. I hereby assume responsibility for my refusal and release the hospital, its personnel, and my physicians from any responsibility for the consequences of my refusal.    o  Refuse   5.   I authorize the use of any specimen, organs, tissues, body parts or foreign objects that may be removed from my body during the operation/procedure for diagnosis, research or teaching purposes and their subsequent disposal by hospital authorities.  I also authorize the release of specimen test results and/or written reports to my treating physician on the hospital medical staff or other referring or consulting physicians involved in my care, at the discretion of the Pathologist or my treating physician.    6.   I consent to the photographing or videotaping of the operations or procedures to be performed, including appropriate portions of my body for medical, scientific, or educational purposes, provided my identity is not revealed by the pictures or by descriptive texts accompanying them.  If the procedure has been photographed/videotaped, the surgeon will obtain the original picture, image, videotape or CD.  The hospital will not be responsible for storage, release or maintenance of the picture, image, tape or CD.    7.   I consent to the presence of a  or observers in the operating room as deemed necessary by my physician or their designees.    8.   I recognize that in the event my procedure results in extended X-Ray/fluoroscopy time, I may develop a skin reaction.    9. If I have a Do  Not Attempt Resuscitation (DNAR) order in place, that status will be suspended while in the operating room, procedural suite, and during the recovery period unless otherwise explicitly stated by me (or a person authorized to consent on my behalf). The surgeon or my attending physician will determine when the applicable recovery period ends for purposes of reinstating the DNAR order.  10. Patients having a sterilization procedure: I understand that if the procedure is successful the results will be permanent and it will therefore be impossible for me to inseminate, conceive, or bear children.  I also understand that the procedure is intended to result in sterility, although the result has not been guaranteed.   11. I acknowledge that my physician has explained sedation/analgesia administration to me including the risk and benefits I consent to the administration of sedation/analgesia as may be necessary or desirable in the judgment of my physician.    I CERTIFY THAT I HAVE READ AND FULLY UNDERSTAND THE ABOVE CONSENT TO OPERATION and/or OTHER PROCEDURE.     ____________________________________  _________________________________        ______________________________  Signature of Patient    Signature of Responsible Person                Printed Name of Responsible Person                                      ____________________________________  _____________________________                ________________________________  Signature of Witness        Date  Time         Relationship to Patient    STATEMENT OF PHYSICIAN My signature below affirms that prior to the time of the procedure; I have explained to the patient and/or his/her legal representative, the risks and benefits involved in the proposed treatment and any reasonable alternative to the proposed treatment. I have also explained the risks and benefits involved in refusal of the proposed treatment and alternatives to the proposed treatment and have answered the  patient's questions. If I have a significant financial interest in a co-management agreement or a significant financial interest in any product or implant, or other significant relationship used in this procedure/surgery, I have disclosed this and had a discussion with my patient.     _____________________________________________________              _____________________________  (Signature of Physician)                                                                                         (Date)                                   (Time)  Patient Name: Anjana Raymundo      : 3/30/1985      Printed: 2024     Medical Record #: [id number]                                      Page 1 of 1

## (undated) NOTE — LETTER
Date & Time: 11/12/2024, 6:03 PM  Patient: Anjana Raymundo  Encounter Provider(s):    Danny Alonzo DO       To Whom It May Concern:    Anjana Raymundo was seen and treated in our department on 11/12/2024. She should not return to work until 11/15/24 .    If you have any questions or concerns, please do not hesitate to call.        _____________________________  Physician/APC Signature

## (undated) NOTE — MR AVS SNAPSHOT
[facility] [address] [address]  [phone]               Thank you for choosing us for your health care visit with Raul Raines DO. We are glad to serve you and happy to provide you with this summary of your visit.   Please he Take 81 mg by mouth daily. Atorvastatin Calcium 40 MG Tabs   TAKE 1 TABLET (40 MG TOTAL) BY MOUTH NIGHTLY. Commonly known as:  LIPITOR           * Canagliflozin 300 MG Tabs   Take 1 tablet by mouth daily.    Commonly known as:  Sharlene Barraza * Notice: This list has 7 medication(s) that are the same as other medications prescribed for you. Read the directions carefully, and ask your doctor or other care provider to review them with you.             Today's Orders     Hemoglobin A1C [E]    Comp active are less likely to develop some chronic diseases than adults who are inactive.      HOW TO GET STARTED: HOW TO STAY MOTIVATED:   Start activities slowly and build up over time Do what you like   Get your heart pumping – brisk walking, biking, swimmin

## (undated) NOTE — MR AVS SNAPSHOT
[address]  [phone]               Thank you for choosing us for your health care visit with Vanessa Klein MD.  We are glad to serve you and happy to provide you with this summary of your visit. This list is accurate as of: 4/11/17  6:40 PM.  Always use your most recent med list.                ACCU-CHEK RYANN PLUS w/Device Kit   Use meter to check blood glucose as instructed           ACCU-CHEK FASTCLIX LANCETS Misc   Check BG 4 times a day Results of Recent Testing     GLUCOSE BLOOD TEST      Component Value Standard Range & Units    GLUCOSE 268     Test Strip Lot # [id number] Numeric    Test Strip Expiration Date 1/31/18 Date                HEMOGLOBIN A1C      Component Value Standard Range & U